# Patient Record
Sex: FEMALE | Race: WHITE | NOT HISPANIC OR LATINO | Employment: FULL TIME | URBAN - METROPOLITAN AREA
[De-identification: names, ages, dates, MRNs, and addresses within clinical notes are randomized per-mention and may not be internally consistent; named-entity substitution may affect disease eponyms.]

---

## 2017-02-03 DIAGNOSIS — Z12.31 OTHER SCREENING MAMMOGRAM: ICD-10-CM

## 2017-02-21 ENCOUNTER — PATIENT OUTREACH (OUTPATIENT)
Dept: ADMINISTRATIVE | Facility: HOSPITAL | Age: 42
End: 2017-02-21

## 2017-03-06 ENCOUNTER — OFFICE VISIT (OUTPATIENT)
Dept: FAMILY MEDICINE | Facility: CLINIC | Age: 42
End: 2017-03-06
Payer: COMMERCIAL

## 2017-03-06 VITALS
WEIGHT: 231.81 LBS | HEIGHT: 67 IN | DIASTOLIC BLOOD PRESSURE: 81 MMHG | BODY MASS INDEX: 36.38 KG/M2 | SYSTOLIC BLOOD PRESSURE: 138 MMHG | HEART RATE: 61 BPM | TEMPERATURE: 99 F

## 2017-03-06 DIAGNOSIS — Z87.01 HISTORY OF PNEUMONIA: ICD-10-CM

## 2017-03-06 DIAGNOSIS — K21.9 GASTROESOPHAGEAL REFLUX DISEASE, ESOPHAGITIS PRESENCE NOT SPECIFIED: ICD-10-CM

## 2017-03-06 DIAGNOSIS — I10 ESSENTIAL HYPERTENSION: ICD-10-CM

## 2017-03-06 DIAGNOSIS — Z83.3 FAMILY HISTORY OF DIABETES MELLITUS: ICD-10-CM

## 2017-03-06 DIAGNOSIS — Z00.00 ROUTINE HEALTH MAINTENANCE: Primary | ICD-10-CM

## 2017-03-06 PROCEDURE — 90670 PCV13 VACCINE IM: CPT | Mod: S$GLB,,, | Performed by: NURSE PRACTITIONER

## 2017-03-06 PROCEDURE — 3079F DIAST BP 80-89 MM HG: CPT | Mod: S$GLB,,, | Performed by: NURSE PRACTITIONER

## 2017-03-06 PROCEDURE — 99999 PR PBB SHADOW E&M-EST. PATIENT-LVL III: CPT | Mod: PBBFAC,,, | Performed by: NURSE PRACTITIONER

## 2017-03-06 PROCEDURE — 99396 PREV VISIT EST AGE 40-64: CPT | Mod: 25,S$GLB,, | Performed by: NURSE PRACTITIONER

## 2017-03-06 PROCEDURE — 90471 IMMUNIZATION ADMIN: CPT | Mod: S$GLB,,, | Performed by: NURSE PRACTITIONER

## 2017-03-06 PROCEDURE — 3075F SYST BP GE 130 - 139MM HG: CPT | Mod: S$GLB,,, | Performed by: NURSE PRACTITIONER

## 2017-03-06 RX ORDER — ENALAPRIL MALEATE 2.5 MG/1
2.5 TABLET ORAL DAILY
Qty: 90 TABLET | Refills: 3 | Status: SHIPPED | OUTPATIENT
Start: 2017-03-06 | End: 2017-07-12

## 2017-03-06 RX ORDER — PANTOPRAZOLE SODIUM 40 MG/1
40 TABLET, DELAYED RELEASE ORAL DAILY
Qty: 90 TABLET | Refills: 3 | Status: SHIPPED | OUTPATIENT
Start: 2017-03-06 | End: 2018-03-08 | Stop reason: SDUPTHER

## 2017-03-06 NOTE — MR AVS SNAPSHOT
[facility]  [address] [address] [address]  Phone: [phone]  Fax: [phone]                  Danielle Alarcon   3/6/2017 8:40 AM   Office Visit    Description:  Female : 1975   Provider:  Cecelia Silva NP   Department:  [facility]           Diagnoses this Visit        Comments    Routine health maintenance    -  Primary     Gastroesophageal reflux disease, esophagitis presence not specified         Essential hypertension         Family history of diabetes mellitus         History of pneumonia     Multiple           To Do List           Goals (5 Years of Data)     None       These Medications        Disp Refills Start End    pantoprazole (PROTONIX) 40 MG tablet 90 tablet 3 3/6/2017     Take 1 tablet (40 mg total) by mouth once daily. - Oral    Pharmacy: [facility] [address] Ph #: [phone]       enalapril (VASOTEC) 2.5 MG tablet 90 tablet 3 3/6/2017     Take 1 tablet (2.5 mg total) by mouth once daily. - Oral    Pharmacy: [address] Ph #: [phone]       Notes to Pharmacy: This prescription was filled today. Any refills authorized will be placed on file.      [facility] On Call     [facility] On Call Nurse Care Line -  Assistance  Registered nurses in the [facility] provide clinical advisement, health education, appointment booking, and other advisory services.  Call for this free service at [phone].             Medications           Message regarding Medications     Verify the changes and/or additions to your medication regime listed below are the same as discussed with your clinician today.  If any of these changes or additions are incorrect, please notify your healthcare provider.        CHANGE how you are taking these medications     Start Taking Instead of    pantoprazole (PROTONIX) 40 MG tablet pantoprazole (PROTONIX)  "40 MG tablet    Dosage:  Take 1 tablet (40 mg total) by mouth once daily. Dosage:  TAKE ONE TABLET BY MOUTH ONCE A DAY    Reason for Change:  Reorder            Verify that the below list of medications is an accurate representation of the medications you are currently taking.  If none reported, the list may be blank. If incorrect, please contact your healthcare provider. Carry this list with you in case of emergency.           Current Medications     enalapril (VASOTEC) 2.5 MG tablet Take 1 tablet (2.5 mg total) by mouth once daily.    FLAXSEED OIL MISC 2 tablets by Misc.(Non-Drug; Combo Route) route every morning. 2 TABS    ibuprofen (ADVIL,MOTRIN) 200 MG tablet Take 200 mg by mouth every 6 (six) hours as needed.     pantoprazole (PROTONIX) 40 MG tablet Take 1 tablet (40 mg total) by mouth once daily.           Clinical Reference Information           Your Vitals Were     BP Pulse Temp Height Weight BMI    138/81 61 98.8 °F (37.1 °C) (Oral) 5' 7" (1.702 m) 105.2 kg (231 lb 13 oz) 36.31 kg/m2      Blood Pressure          Most Recent Value    BP  138/81      Allergies as of 3/6/2017     No Known Allergies      Immunizations Administered on Date of Encounter - 3/6/2017     Name Date Dose VIS Date Route    Pneumococcal Conjugate - 13 Valent  Incomplete 0.5 mL 11/5/2015 Intramuscular      Orders Placed During Today's Visit      Normal Orders This Visit    Pneumococcal Conjugate Vaccine (13 Valent) (IM)     Future Labs/Procedures Expected by Expires    ALT (SGPT)  3/6/2017 5/5/2018    Basic metabolic panel  3/6/2017 3/6/2018    CBC auto differential  3/6/2017 5/5/2018    Hemoglobin A1c  3/6/2017 5/5/2018    Lipid panel  3/6/2017 3/6/2018    TSH  3/6/2017 5/5/2018      Language Assistance Services     ATTENTION: Language assistance services are available, free of charge. Please call [phone].      ATENCIÓN: Si habla español, tiene a caba disposición servicios gratuitos de asistencia lingüística. Llame al " [phone].     OLGA Ý: N?u b?n nói Ti?ng Vi?t, có các d?ch v? h? tr? ngôn ng? mi?n phí dành cho b?n. G?i s? [phone].         [facility] complies with applicable Federal civil rights laws and does not discriminate on the basis of race, color, national origin, age, disability, or sex.

## 2017-03-06 NOTE — PROGRESS NOTES
Subjective:       Patient ID: Danielle Alarcon is a 41 y.o. female.    Chief Complaint: No chief complaint on file.  Pt in today for annual exam. GERD, HTN well controlled with current medications. Mammogram UTD. Pt states had pap smear in November 2016. Labs due. Pt states has had multiple pneumonia infections, invasive pneumonia; PCV 23 UTD, however UTD states both PCV 23 and 13 recommended. Pt also requests hgb a1c due to family history of diabetes, personal history of gestational diabetes; denies any diabetes symptoms. Pt has no other complaints today.  Past Medical History:   Diagnosis Date    Bronchial pneumonia     2008    Depression     Gestational diabetes      Social History     Social History    Marital status:      Spouse name: N/A    Number of children: N/A    Years of education: N/A     Occupational History         Social History Main Topics    Smoking status: Former Smoker    Smokeless tobacco: Not on file    Alcohol use No    Drug use: No    Sexual activity: Not on file     Past Surgical History:   Procedure Laterality Date    CHOLECYSTECTOMY      TONSILLECTOMY         HPI  Review of Systems   Constitutional: Negative.    HENT: Negative.    Eyes: Negative.    Respiratory: Negative.    Cardiovascular: Negative.    Gastrointestinal: Negative.    Endocrine: Negative.    Genitourinary: Negative.    Musculoskeletal: Negative.    Skin: Negative.    Allergic/Immunologic: Negative.    Neurological: Negative.    Psychiatric/Behavioral: Negative.        Objective:      Physical Exam   Constitutional: She is oriented to person, place, and time. She appears well-developed and well-nourished.   HENT:   Head: Normocephalic.   Right Ear: External ear normal.   Left Ear: External ear normal.   Nose: Nose normal.   Mouth/Throat: Oropharynx is clear and moist.   Eyes: Conjunctivae are normal. Pupils are equal, round, and reactive to light.   Neck: Normal range of motion. Neck supple.    Cardiovascular: Normal rate, regular rhythm and normal heart sounds.    Pulmonary/Chest: Effort normal and breath sounds normal.   Abdominal: Soft. Bowel sounds are normal.   Musculoskeletal: Normal range of motion.   Neurological: She is alert and oriented to person, place, and time.   Skin: Skin is warm and dry.   Psychiatric: She has a normal mood and affect. Her behavior is normal. Judgment and thought content normal.   Nursing note and vitals reviewed.      Assessment:       1. Routine health maintenance    2. Gastroesophageal reflux disease, esophagitis presence not specified    3. Essential hypertension    4. Family history of diabetes mellitus    5. History of pneumonia        Plan:           Diagnoses and all orders for this visit:    Routine health maintenance  Gastroesophageal reflux disease, esophagitis presence not specified  Essential hypertension  Family history of diabetes mellitus  History of pneumonia  -     Basic metabolic panel; Future  -     ALT (SGPT); Future  -     TSH; Future  -     CBC auto differential; Future  -     Lipid panel; Future  -     Hemoglobin A1c; Future  Comments:  Multiple  -     pantoprazole (PROTONIX) 40 MG tablet; Take 1 tablet (40 mg total) by mouth once daily.  -     enalapril (VASOTEC) 2.5 MG tablet; Take 1 tablet (2.5 mg total) by mouth once daily.  -     Pneumococcal Conjugate Vaccine (13 Valent) (IM)

## 2017-03-08 ENCOUNTER — TELEPHONE (OUTPATIENT)
Dept: FAMILY MEDICINE | Facility: CLINIC | Age: 42
End: 2017-03-08

## 2017-03-08 DIAGNOSIS — K80.50 BILIARY COLIC: Primary | ICD-10-CM

## 2017-03-08 LAB
ALT SERPL-CCNC: 12 U/L (ref 6–29)
BASOPHILS # BLD AUTO: 36 CELLS/UL (ref 0–200)
BASOPHILS NFR BLD AUTO: 0.5 %
BUN SERPL-MCNC: 14 MG/DL (ref 7–25)
BUN/CREAT SERPL: ABNORMAL (CALC) (ref 6–22)
CALCIUM SERPL-MCNC: 8.7 MG/DL (ref 8.6–10.2)
CHLORIDE SERPL-SCNC: 107 MMOL/L (ref 98–110)
CHOLEST SERPL-MCNC: 167 MG/DL (ref 125–200)
CHOLEST/HDLC SERPL: 2.7 (CALC)
CO2 SERPL-SCNC: 27 MMOL/L (ref 20–31)
CREAT SERPL-MCNC: 0.92 MG/DL (ref 0.5–1.1)
EOSINOPHIL # BLD AUTO: 0 CELLS/UL (ref 15–500)
EOSINOPHIL NFR BLD AUTO: 0 %
ERYTHROCYTE [DISTWIDTH] IN BLOOD BY AUTOMATED COUNT: 17.8 % (ref 11–15)
GFR SERPL CREATININE-BSD FRML MDRD: 76 ML/MIN/1.73M2
GLUCOSE SERPL-MCNC: 101 MG/DL (ref 65–99)
HBA1C MFR BLD: 5.5 % OF TOTAL HGB
HCT VFR BLD AUTO: 39.1 % (ref 35–45)
HDLC SERPL-MCNC: 63 MG/DL
HGB BLD-MCNC: 12.9 G/DL (ref 11.7–15.5)
LDLC SERPL CALC-MCNC: 97 MG/DL (CALC)
LYMPHOCYTES # BLD AUTO: 1843 CELLS/UL (ref 850–3900)
LYMPHOCYTES NFR BLD AUTO: 25.6 %
MCH RBC QN AUTO: 27.7 PG (ref 27–33)
MCHC RBC AUTO-ENTMCNC: 33 G/DL (ref 32–36)
MCV RBC AUTO: 83.9 FL (ref 80–100)
MONOCYTES # BLD AUTO: 353 CELLS/UL (ref 200–950)
MONOCYTES NFR BLD AUTO: 4.9 %
NEUTROPHILS # BLD AUTO: 4968 CELLS/UL (ref 1500–7800)
NEUTROPHILS NFR BLD AUTO: 69 %
NONHDLC SERPL-MCNC: 104 MG/DL (CALC)
PLATELET # BLD AUTO: 148 THOUSAND/UL (ref 140–400)
PMV BLD REES-ECKER: 10.7 FL (ref 7.5–12.5)
POTASSIUM SERPL-SCNC: 4.6 MMOL/L (ref 3.5–5.3)
RBC # BLD AUTO: 4.66 MILLION/UL (ref 3.8–5.1)
SODIUM SERPL-SCNC: 141 MMOL/L (ref 135–146)
TRIGL SERPL-MCNC: 35 MG/DL
TSH SERPL-ACNC: 0.57 MIU/L
WBC # BLD AUTO: 7.2 THOUSAND/UL (ref 3.8–10.8)

## 2017-05-23 ENCOUNTER — PATIENT MESSAGE (OUTPATIENT)
Dept: FAMILY MEDICINE | Facility: CLINIC | Age: 42
End: 2017-05-23

## 2017-05-23 RX ORDER — LISINOPRIL 2.5 MG/1
2.5 TABLET ORAL DAILY
Qty: 90 TABLET | Refills: 3 | Status: SHIPPED | OUTPATIENT
Start: 2017-05-23 | End: 2018-03-08 | Stop reason: SDUPTHER

## 2017-07-12 ENCOUNTER — HOSPITAL ENCOUNTER (OUTPATIENT)
Dept: RADIOLOGY | Facility: HOSPITAL | Age: 42
Discharge: HOME | End: 2017-07-12
Attending: NURSE PRACTITIONER
Payer: COMMERCIAL

## 2017-07-12 ENCOUNTER — OFFICE VISIT (OUTPATIENT)
Dept: FAMILY MEDICINE | Facility: CLINIC | Age: 42
End: 2017-07-12
Payer: COMMERCIAL

## 2017-07-12 ENCOUNTER — TELEPHONE (OUTPATIENT)
Dept: FAMILY MEDICINE | Facility: CLINIC | Age: 42
End: 2017-07-12

## 2017-07-12 VITALS
HEIGHT: 67 IN | DIASTOLIC BLOOD PRESSURE: 98 MMHG | WEIGHT: 224.31 LBS | RESPIRATION RATE: 18 BRPM | SYSTOLIC BLOOD PRESSURE: 134 MMHG | BODY MASS INDEX: 35.21 KG/M2 | HEART RATE: 82 BPM | TEMPERATURE: 98 F

## 2017-07-12 DIAGNOSIS — J40 SINOBRONCHITIS: Primary | ICD-10-CM

## 2017-07-12 DIAGNOSIS — J32.9 SINOBRONCHITIS: Primary | ICD-10-CM

## 2017-07-12 DIAGNOSIS — J32.9 SINOBRONCHITIS: ICD-10-CM

## 2017-07-12 DIAGNOSIS — I10 ESSENTIAL HYPERTENSION: ICD-10-CM

## 2017-07-12 DIAGNOSIS — J40 SINOBRONCHITIS: ICD-10-CM

## 2017-07-12 PROCEDURE — 99214 OFFICE O/P EST MOD 30 MIN: CPT | Mod: S$GLB,,, | Performed by: NURSE PRACTITIONER

## 2017-07-12 PROCEDURE — 99999 PR PBB SHADOW E&M-EST. PATIENT-LVL III: CPT | Mod: PBBFAC,,, | Performed by: NURSE PRACTITIONER

## 2017-07-12 PROCEDURE — 71020 XR CHEST PA AND LATERAL: CPT | Mod: TC,PO

## 2017-07-12 PROCEDURE — 71020 XR CHEST PA AND LATERAL: CPT | Mod: 26,,, | Performed by: RADIOLOGY

## 2017-07-12 RX ORDER — CODEINE PHOSPHATE AND GUAIFENESIN 10; 100 MG/5ML; MG/5ML
5 SOLUTION ORAL EVERY 8 HOURS PRN
Qty: 118 ML | Refills: 0 | Status: SHIPPED | OUTPATIENT
Start: 2017-07-12 | End: 2017-07-22

## 2017-07-12 RX ORDER — FLUTICASONE PROPIONATE 50 MCG
1 SPRAY, SUSPENSION (ML) NASAL DAILY
Qty: 16 G | Refills: 0 | Status: SHIPPED | OUTPATIENT
Start: 2017-07-12 | End: 2018-03-08

## 2017-07-12 RX ORDER — CODEINE PHOSPHATE AND GUAIFENESIN 10; 100 MG/5ML; MG/5ML
5 SOLUTION ORAL EVERY 8 HOURS PRN
Qty: 118 ML | Refills: 0 | Status: SHIPPED | OUTPATIENT
Start: 2017-07-12 | End: 2017-07-12 | Stop reason: SDUPTHER

## 2017-07-12 RX ORDER — ALBUTEROL SULFATE 90 UG/1
2 AEROSOL, METERED RESPIRATORY (INHALATION) EVERY 6 HOURS PRN
Qty: 18 G | Refills: 0 | Status: SHIPPED | OUTPATIENT
Start: 2017-07-12 | End: 2018-03-08

## 2017-07-12 RX ORDER — DOXYCYCLINE HYCLATE 100 MG
100 TABLET ORAL 2 TIMES DAILY
Qty: 14 TABLET | Refills: 0 | Status: SHIPPED | OUTPATIENT
Start: 2017-07-12 | End: 2018-03-08

## 2017-07-12 RX ORDER — METHYLPREDNISOLONE 4 MG/1
TABLET ORAL
Qty: 1 PACKAGE | Refills: 0 | Status: SHIPPED | OUTPATIENT
Start: 2017-07-12 | End: 2018-03-08

## 2017-07-12 NOTE — TELEPHONE ENCOUNTER
----- Message from Mi Weiss sent at 7/12/2017  9:45 AM CDT -----  Contact: Pt  - Max   States he's calling rg pt's insurance not being able to verify. Pt has bronchitis and is wanting to discuss / pt can be reached at [phone]//thanks/dbw

## 2017-07-12 NOTE — PROGRESS NOTES
"CC:   Chief Complaint   Patient presents with    Cough     HPI: This is a new problem.   Danielle Alarcon is a 41 y.o. female with a complaint of URI.  The current episode started in the past 4 days.   The problem has been gradually worsening.   Associated symptoms included fever, chills, cough, wheezing, yellow and brown sputum.    Pertinent negatives include dyspnea   Treatments tried: ibuprofen and tessalon perles has been used and this has provided no relief.   Non smoker, history of pneumonia      [unfilled]  Outpatient Medications Prior to Visit   Medication Sig Dispense Refill    FLAXSEED OIL MISC 2 tablets by Misc.(Non-Drug; Combo Route) route every morning. 2 TABS      ibuprofen (ADVIL,MOTRIN) 200 MG tablet Take 200 mg by mouth every 6 (six) hours as needed.       lisinopril (PRINIVIL,ZESTRIL) 2.5 MG tablet Take 1 tablet (2.5 mg total) by mouth once daily. 90 tablet 3    pantoprazole (PROTONIX) 40 MG tablet Take 1 tablet (40 mg total) by mouth once daily. 90 tablet 3    enalapril (VASOTEC) 2.5 MG tablet Take 1 tablet (2.5 mg total) by mouth once daily. 90 tablet 3     No facility-administered medications prior to visit.         Physical Exam   BP (!) 134/98   Pulse 82   Temp 98.4 °F (36.9 °C) (Oral)   Resp 18   Ht 5' 7" (1.702 m)   Wt 101.8 kg (224 lb 5.1 oz)   BMI 35.13 kg/m²   Constitutional: The patient appears well-developed and well-nourished.   Head: Normocephalic and atraumatic.   Right Ear: Tympanic membrane and ear canal normal. No drainage, swelling or tenderness. Tympanic membrane is not injected, not erythematous and not bulging.   Left Ear: Ear canal normal. No drainage, swelling or tenderness. Tympanic membrane is not injected, not erythematous and not bulging.   Nose: Mucosal edema and rhinorrhea present. Sinus tenderness on palpation  Mouth/Throat: Uvula is midline. Posterior oropharyngeal erythema present. No oropharyngeal exudate.        THE MUCOSA IS BOGGY AND " ERYTHEMATOUS.     Eyes: Conjunctivae normal and lids are normal. Pupils are equal, round, and reactive to light. Right eye exhibits no discharge. Left eye exhibits no discharge. Right eye exhibits normal extraocular motion. Left eye exhibits normal extraocular motion.   Neck: Trachea normal and normal range of motion. Neck supple. No tracheal tenderness present. No mass and no thyromegaly present.   Cardiovascular: Normal rate, regular rhythm, S1 normal, S2 normal and normal heart sounds.  Exam reveals no gallop, no S3, no S4 and no friction rub.    No murmur heard.  Pulmonary/Chest: Effort normal and breath sounds normal. No stridor. Not tachypneic. No respiratory distress. The patient has no wheezes. The patient has no rhonchi. The patient has no rales.   Skin: The patient is not diaphoretic.     Encounter Diagnoses   Name Primary?    Sinobronchitis Yes    Essential hypertension        PLAN:    Danielle was seen today for cough.    Diagnoses and all orders for this visit:    Sinobronchitis  -     X-Ray Chest PA And Lateral; Future  -     fluticasone (FLONASE) 50 mcg/actuation nasal spray; 1 spray by Each Nare route once daily.  -     doxycycline (VIBRA-TABS) 100 MG tablet; Take 1 tablet (100 mg total) by mouth 2 (two) times daily.  -     albuterol 90 mcg/actuation inhaler; Inhale 2 puffs into the lungs every 6 (six) hours as needed for Wheezing. Rescue  -     methylPREDNISolone (MEDROL DOSEPACK) 4 mg tablet; use as directed  -     Discontinue: guaifenesin-codeine 100-10 mg/5 ml (TUSSI-ORGANIDIN NR)  mg/5 mL syrup; Take 5 mLs by mouth every 8 (eight) hours as needed for Cough.  -     guaifenesin-codeine 100-10 mg/5 ml (TUSSI-ORGANIDIN NR)  mg/5 mL syrup; Take 5 mLs by mouth every 8 (eight) hours as needed for Cough.    CXR: Heart size and mediastinal contour are normal.  No confluent infiltrates or effusions noted.  Vague developing opacity noted over the medial right lung apex, indeterminate.  CT  recommended for further evaluation.  -orders for CT placed. Will have patient follow up  Essential hypertension  -bp slightly elevated. Pt experienced moderate amount of coughing throughout appt.  Will re evaluate bp in 1-2 weeks upon resolution of sinobronchitis      Medications Ordered This Encounter      albuterol 90 mcg/actuation inhaler          Sig: Inhale 2 puffs into the lungs every 6 (six) hours as needed for Wheezing. Rescue          Dispense:  18 g          Refill:  0      doxycycline (VIBRA-TABS) 100 MG tablet          Sig: Take 1 tablet (100 mg total) by mouth 2 (two) times daily.          Dispense:  14 tablet          Refill:  0      fluticasone (FLONASE) 50 mcg/actuation nasal spray          Si spray by Each Nare route once daily.          Dispense:  16 g          Refill:  0      guaifenesin-codeine 100-10 mg/5 ml (TUSSI-ORGANIDIN NR)  mg/5 mL syrup          Sig: Take 5 mLs by mouth every 8 (eight) hours as needed for Cough.          Dispense:  118 mL          Refill:  0      methylPREDNISolone (MEDROL DOSEPACK) 4 mg tablet          Sig: use as directed          Dispense:  1 Package          Refill:  0  Orders Placed This Encounter   Procedures    X-Ray Chest PA And Lateral     Standing Status:   Future     Number of Occurrences:   1     Standing Expiration Date:   2018     Order Specific Question:   May the Radiologist modify the order per protocol to meet the clinical needs of the patient?     Answer:   Yes     RTC if symptoms are worsening or changing significantly or if not improved by the end of therapy.

## 2017-07-13 ENCOUNTER — HOSPITAL ENCOUNTER (OUTPATIENT)
Dept: RADIOLOGY | Facility: HOSPITAL | Age: 42
Discharge: HOME | End: 2017-07-13
Attending: NURSE PRACTITIONER
Payer: COMMERCIAL

## 2017-07-13 DIAGNOSIS — J40 SINOBRONCHITIS: ICD-10-CM

## 2017-07-13 DIAGNOSIS — J32.9 SINOBRONCHITIS: ICD-10-CM

## 2017-07-13 PROCEDURE — 71250 CT THORAX DX C-: CPT | Mod: 26,,, | Performed by: RADIOLOGY

## 2017-07-13 PROCEDURE — 71250 CT THORAX DX C-: CPT | Mod: TC,PO

## 2017-07-17 DIAGNOSIS — M25.511 ACUTE PAIN OF RIGHT SHOULDER: Primary | ICD-10-CM

## 2017-08-04 DIAGNOSIS — M25.511 ACUTE PAIN OF RIGHT SHOULDER: Primary | ICD-10-CM

## 2017-08-07 ENCOUNTER — OFFICE VISIT (OUTPATIENT)
Dept: ORTHOPEDICS | Facility: CLINIC | Age: 42
End: 2017-08-07
Payer: COMMERCIAL

## 2017-08-07 ENCOUNTER — HOSPITAL ENCOUNTER (OUTPATIENT)
Dept: RADIOLOGY | Facility: HOSPITAL | Age: 42
Discharge: HOME | End: 2017-08-07
Attending: ORTHOPAEDIC SURGERY
Payer: COMMERCIAL

## 2017-08-07 VITALS
HEART RATE: 72 BPM | SYSTOLIC BLOOD PRESSURE: 138 MMHG | WEIGHT: 222.81 LBS | HEIGHT: 67 IN | BODY MASS INDEX: 34.97 KG/M2 | DIASTOLIC BLOOD PRESSURE: 86 MMHG

## 2017-08-07 DIAGNOSIS — M25.511 ACUTE PAIN OF RIGHT SHOULDER: ICD-10-CM

## 2017-08-07 DIAGNOSIS — M75.31 CALCIFIC TENDONITIS OF RIGHT SHOULDER: Primary | ICD-10-CM

## 2017-08-07 DIAGNOSIS — S46.811A TRAPEZIUS STRAIN, RIGHT, INITIAL ENCOUNTER: ICD-10-CM

## 2017-08-07 DIAGNOSIS — M19.011 ARTHRITIS OF RIGHT ACROMIOCLAVICULAR JOINT: ICD-10-CM

## 2017-08-07 PROCEDURE — 73030 X-RAY EXAM OF SHOULDER: CPT | Mod: TC,PO,RT

## 2017-08-07 PROCEDURE — 73030 X-RAY EXAM OF SHOULDER: CPT | Mod: 26,RT,, | Performed by: RADIOLOGY

## 2017-08-07 PROCEDURE — 99214 OFFICE O/P EST MOD 30 MIN: CPT | Mod: S$GLB,,, | Performed by: ORTHOPAEDIC SURGERY

## 2017-08-07 PROCEDURE — 99999 PR PBB SHADOW E&M-EST. PATIENT-LVL III: CPT | Mod: PBBFAC,,, | Performed by: ORTHOPAEDIC SURGERY

## 2017-08-07 PROCEDURE — 3075F SYST BP GE 130 - 139MM HG: CPT | Mod: S$GLB,,, | Performed by: ORTHOPAEDIC SURGERY

## 2017-08-07 PROCEDURE — 3008F BODY MASS INDEX DOCD: CPT | Mod: S$GLB,,, | Performed by: ORTHOPAEDIC SURGERY

## 2017-08-07 PROCEDURE — 3079F DIAST BP 80-89 MM HG: CPT | Mod: S$GLB,,, | Performed by: ORTHOPAEDIC SURGERY

## 2017-08-07 RX ORDER — MELOXICAM 15 MG/1
15 TABLET ORAL DAILY
Qty: 60 TABLET | Refills: 0 | Status: SHIPPED | OUTPATIENT
Start: 2017-08-07 | End: 2018-03-08

## 2017-08-07 NOTE — LETTER
August 9, 2017      Queenie Herrera NP  [address]  1st [address]  [facility] [address]           [facility]  [address] [facility] [address]  Phone: [phone]          Patient: Danielle Alarcon   MR Number: [id number]   YOB: 1975   Date of Visit: 8/7/2017       Dear Queenie Herrera:    Thank you for referring Danielle Alarcon to me for evaluation. Attached you will find relevant portions of my assessment and plan of care.    If you have questions, please do not hesitate to call me. I look forward to following Danielle Alarcon along with you.    Sincerely,    Flex Gonzales MD    Enclosure  CC:  No Recipients    If you would like to receive this communication electronically, please contact [email or web address] or [phone] to request more information on [facility] Link access.    For providers and/or their staff who would like to refer a patient to [facility], please contact us through our one-stop-shop provider referral line, Conch[facility] Root, at [phone].    If you feel you have received this communication in error or would no longer like to receive these types of communications, please e-mail [email or web address]

## 2017-08-09 NOTE — PROGRESS NOTES
Subjective:          Chief Complaint: Danielle Alarcon is a 42 y.o. female who had concerns including Pain of the Right Shoulder.    Ms. Santos is here today for right shoulder pain. She has had this pain in the past and is primarily over her right trapezius. Her pain had gotten better after her last recurrence but returned after starting to work again. She is a .    Pain: 1/10      Pain   Associated symptoms include myalgias.          Past Medical History:   Diagnosis Date    Bronchial pneumonia     2008    Depression     Gestational diabetes        Past Surgical History:   Procedure Laterality Date    CHOLECYSTECTOMY      TONSILLECTOMY         Family History   Problem Relation Age of Onset    Diabetes Mother     Hypertension Mother     Hyperlipidemia Mother     Hyperlipidemia Father     Hypertension Father     Diabetes Brother          Current Outpatient Prescriptions:     albuterol 90 mcg/actuation inhaler, Inhale 2 puffs into the lungs every 6 (six) hours as needed for Wheezing. Rescue, Disp: 18 g, Rfl: 0    doxycycline (VIBRA-TABS) 100 MG tablet, Take 1 tablet (100 mg total) by mouth 2 (two) times daily., Disp: 14 tablet, Rfl: 0    FLAXSEED OIL MISC, 2 tablets by Misc.(Non-Drug; Combo Route) route every morning. 2 TABS, Disp: , Rfl:     fluticasone (FLONASE) 50 mcg/actuation nasal spray, 1 spray by Each Nare route once daily., Disp: 16 g, Rfl: 0    ibuprofen (ADVIL,MOTRIN) 200 MG tablet, Take 200 mg by mouth every 6 (six) hours as needed. , Disp: , Rfl:     lisinopril (PRINIVIL,ZESTRIL) 2.5 MG tablet, Take 1 tablet (2.5 mg total) by mouth once daily., Disp: 90 tablet, Rfl: 3    methylPREDNISolone (MEDROL DOSEPACK) 4 mg tablet, use as directed, Disp: 1 Package, Rfl: 0    pantoprazole (PROTONIX) 40 MG tablet, Take 1 tablet (40 mg total) by mouth once daily., Disp: 90 tablet, Rfl: 3    meloxicam (MOBIC) 15 MG tablet, Take 1 tablet (15 mg total) by mouth once daily., Disp: 60 tablet,  Rfl: 0    Review of patient's allergies indicates:  No Known Allergies    Vitals:    08/07/17 1310   BP: 138/86   Pulse: 72       Review of Systems   Musculoskeletal: Positive for joint pain and myalgias.   All other systems reviewed and are negative.                  Objective:        General: Danielle is well-developed, well-nourished, appears stated age, in no acute distress, alert and oriented to time, place and person.     General    Vitals reviewed.  Constitutional: She is oriented to person, place, and time. She appears well-developed and well-nourished. No distress.   HENT:   Head: Normocephalic and atraumatic.   Nose: Nose normal.   Eyes: Pupils are equal, round, and reactive to light.   Cardiovascular: Normal rate.    Pulmonary/Chest: Effort normal.   Neurological: She is alert and oriented to person, place, and time.   Psychiatric: She has a normal mood and affect. Her behavior is normal. Judgment and thought content normal.         Back (L-Spine & T-Spine) / Neck (C-Spine) Exam     Tenderness   The patient is tender to palpation of the right trapezial.   Right Shoulder Exam     Inspection/Observation   Bruising: absent  Scars: absent  Deformity: present  Scapular Winging: absent  Scapular Dyskinesia: positive  Atrophy: absent    Tenderness   The patient is tender to palpation of the acromioclavicular joint.    Range of Motion   Active Abduction:  170 normal   Passive Abduction:  170 normal   Extension: normal   Forward Flexion:  180 normal   Forward Elevation: 180 normal  Adduction: normal  External Rotation 0 degrees:  50 normal   External Rotation 90 degrees: 80 normal  Internal Rotation 0 degrees:  Mid thoracic normal   Internal Rotation 90 degrees:  50 normal     Tests & Signs   Rotator Cuff Painful Arc/Range: mild    Other   Sensation: normal    Left Shoulder Exam   Left shoulder exam is normal.      Muscle Strength   Right Upper Extremity   Shoulder Abduction: 5/5   Shoulder Internal Rotation: 5/5    Shoulder External Rotation: 5/5   Supraspinatus: 5/5/5   Subscapularis: 5/5/5   Biceps: 5/5/5     Vascular Exam       Capillary Refill  Right Hand: normal capillary refill        Current and previous radiographic studies and results were reviewed with the patient:   Findings: There is degenerative hypertrophy and subarticular spurring of the acromioclavicular joint.  Glenohumeral joint is intact.  Mild spurring of the greater tuberosity of the humerus.  Soft tissue calcification in the acromiohumeral space suggesting calcific tendinitis.  No acute fracture or dislocation.        Assessment:       Encounter Diagnoses   Name Primary?    Acute pain of right shoulder     Arthritis of right acromioclavicular joint     Calcific tendonitis of right shoulder Yes    Trapezius strain, right, initial encounter           Plan:         Physical Therapy  Medrol Dosepack x 1  Meloxicam 15mg PO QD  F/U in 6 weeks

## 2018-02-22 ENCOUNTER — PATIENT MESSAGE (OUTPATIENT)
Dept: ADMINISTRATIVE | Facility: HOSPITAL | Age: 43
End: 2018-02-22

## 2018-02-22 ENCOUNTER — PATIENT OUTREACH (OUTPATIENT)
Dept: ADMINISTRATIVE | Facility: HOSPITAL | Age: 43
End: 2018-02-22

## 2018-02-22 DIAGNOSIS — Z00.00 ROUTINE ADULT HEALTH MAINTENANCE: Primary | ICD-10-CM

## 2018-02-22 NOTE — TELEPHONE ENCOUNTER
Dr Adame, this patient has an appointment for an annual on 3/8/18, she wants lab work done before that visit, and she wants to go to [facility], is this something you want to do? Please advise, thanks

## 2018-03-03 LAB
ALBUMIN SERPL-MCNC: 3.9 G/DL (ref 3.6–5.1)
ALBUMIN/GLOB SERPL: 1.7 (CALC) (ref 1–2.5)
ALP SERPL-CCNC: 74 U/L (ref 33–115)
ALT SERPL-CCNC: 10 U/L (ref 6–29)
AST SERPL-CCNC: 12 U/L (ref 10–30)
BASOPHILS # BLD AUTO: 48 CELLS/UL (ref 0–200)
BASOPHILS NFR BLD AUTO: 0.8 %
BILIRUB SERPL-MCNC: 0.5 MG/DL (ref 0.2–1.2)
BUN SERPL-MCNC: 18 MG/DL (ref 7–25)
BUN/CREAT SERPL: NORMAL (CALC) (ref 6–22)
CALCIUM SERPL-MCNC: 8.7 MG/DL (ref 8.6–10.2)
CHLORIDE SERPL-SCNC: 108 MMOL/L (ref 98–110)
CO2 SERPL-SCNC: 29 MMOL/L (ref 20–31)
CREAT SERPL-MCNC: 0.88 MG/DL (ref 0.5–1.1)
EOSINOPHIL # BLD AUTO: 204 CELLS/UL (ref 15–500)
EOSINOPHIL NFR BLD AUTO: 3.4 %
ERYTHROCYTE [DISTWIDTH] IN BLOOD BY AUTOMATED COUNT: 15 % (ref 11–15)
GFR SERPL CREATININE-BSD FRML MDRD: 80 ML/MIN/1.73M2
GLOBULIN SER CALC-MCNC: 2.3 G/DL (CALC) (ref 1.9–3.7)
GLUCOSE SERPL-MCNC: 89 MG/DL (ref 65–99)
HCT VFR BLD AUTO: 35.7 % (ref 35–45)
HGB BLD-MCNC: 11.4 G/DL (ref 11.7–15.5)
LYMPHOCYTES # BLD AUTO: 1752 CELLS/UL (ref 850–3900)
LYMPHOCYTES NFR BLD AUTO: 29.2 %
MCH RBC QN AUTO: 25.9 PG (ref 27–33)
MCHC RBC AUTO-ENTMCNC: 31.9 G/DL (ref 32–36)
MCV RBC AUTO: 81 FL (ref 80–100)
MONOCYTES # BLD AUTO: 264 CELLS/UL (ref 200–950)
MONOCYTES NFR BLD AUTO: 4.4 %
NEUTROPHILS # BLD AUTO: 3732 CELLS/UL (ref 1500–7800)
NEUTROPHILS NFR BLD AUTO: 62.2 %
PLATELET # BLD AUTO: 149 THOUSAND/UL (ref 140–400)
PMV BLD REES-ECKER: 12.7 FL (ref 7.5–12.5)
POTASSIUM SERPL-SCNC: 4.4 MMOL/L (ref 3.5–5.3)
PROT SERPL-MCNC: 6.2 G/DL (ref 6.1–8.1)
RBC # BLD AUTO: 4.41 MILLION/UL (ref 3.8–5.1)
SODIUM SERPL-SCNC: 141 MMOL/L (ref 135–146)
TSH SERPL-ACNC: 0.48 MIU/L
WBC # BLD AUTO: 6 THOUSAND/UL (ref 3.8–10.8)

## 2018-03-08 ENCOUNTER — OFFICE VISIT (OUTPATIENT)
Dept: FAMILY MEDICINE | Facility: CLINIC | Age: 43
End: 2018-03-08
Payer: COMMERCIAL

## 2018-03-08 VITALS
TEMPERATURE: 99 F | HEIGHT: 67 IN | WEIGHT: 217.13 LBS | BODY MASS INDEX: 34.08 KG/M2 | DIASTOLIC BLOOD PRESSURE: 74 MMHG | HEART RATE: 64 BPM | SYSTOLIC BLOOD PRESSURE: 120 MMHG

## 2018-03-08 DIAGNOSIS — Z23 NEED FOR VACCINATION: Primary | ICD-10-CM

## 2018-03-08 PROCEDURE — 90715 TDAP VACCINE 7 YRS/> IM: CPT | Mod: S$GLB,,, | Performed by: FAMILY MEDICINE

## 2018-03-08 PROCEDURE — 99999 PR PBB SHADOW E&M-EST. PATIENT-LVL III: CPT | Mod: PBBFAC,,, | Performed by: FAMILY MEDICINE

## 2018-03-08 PROCEDURE — 90471 IMMUNIZATION ADMIN: CPT | Mod: S$GLB,,, | Performed by: FAMILY MEDICINE

## 2018-03-08 PROCEDURE — 99396 PREV VISIT EST AGE 40-64: CPT | Mod: 25,S$GLB,, | Performed by: FAMILY MEDICINE

## 2018-03-08 RX ORDER — LISINOPRIL 2.5 MG/1
2.5 TABLET ORAL DAILY
Qty: 90 TABLET | Refills: 3 | Status: SHIPPED | OUTPATIENT
Start: 2018-03-08 | End: 2018-06-04 | Stop reason: SDUPTHER

## 2018-03-08 RX ORDER — PANTOPRAZOLE SODIUM 40 MG/1
40 TABLET, DELAYED RELEASE ORAL DAILY
Qty: 90 TABLET | Refills: 3 | Status: SHIPPED | OUTPATIENT
Start: 2018-03-08 | End: 2018-12-07 | Stop reason: SDUPTHER

## 2018-03-08 NOTE — PROGRESS NOTES
The patient presents today for general health evaluation and counseling      Past Medical History:  Past Medical History:   Diagnosis Date    Bronchial pneumonia     2008    Depression     Gestational diabetes      Past Surgical History:   Procedure Laterality Date    CHOLECYSTECTOMY      TONSILLECTOMY       Review of patient's allergies indicates:  No Known Allergies  Current Outpatient Prescriptions on File Prior to Visit   Medication Sig Dispense Refill    FLAXSEED OIL MISC 2 tablets by Misc.(Non-Drug; Combo Route) route every morning. 2 TABS      ibuprofen (ADVIL,MOTRIN) 200 MG tablet Take 200 mg by mouth every 6 (six) hours as needed.       lisinopril (PRINIVIL,ZESTRIL) 2.5 MG tablet Take 1 tablet (2.5 mg total) by mouth once daily. 90 tablet 3    pantoprazole (PROTONIX) 40 MG tablet Take 1 tablet (40 mg total) by mouth once daily. 90 tablet 3    albuterol 90 mcg/actuation inhaler Inhale 2 puffs into the lungs every 6 (six) hours as needed for Wheezing. Rescue 18 g 0    fluticasone (FLONASE) 50 mcg/actuation nasal spray 1 spray by Each Nare route once daily. 16 g 0    meloxicam (MOBIC) 15 MG tablet Take 1 tablet (15 mg total) by mouth once daily. 60 tablet 0    [DISCONTINUED] doxycycline (VIBRA-TABS) 100 MG tablet Take 1 tablet (100 mg total) by mouth 2 (two) times daily. 14 tablet 0    [DISCONTINUED] methylPREDNISolone (MEDROL DOSEPACK) 4 mg tablet use as directed 1 Package 0     No current facility-administered medications on file prior to visit.      Social History     Social History    Marital status:      Spouse name: N/A    Number of children: N/A    Years of education: N/A     Occupational History    Not on file.     Social History Main Topics    Smoking status: Former Smoker    Smokeless tobacco: Not on file    Alcohol use No    Drug use: No    Sexual activity: Not on file     Other Topics Concern    Not on file     Social History Narrative    No narrative on file      Family History   Problem Relation Age of Onset    Diabetes Mother     Hypertension Mother     Hyperlipidemia Mother     Hyperlipidemia Father     Hypertension Father     Diabetes Brother          ROS:GENERAL: No fever, chills, fatigability or weight loss.  SKIN: No rashes, itching or changes in color or texture of skin.  HEAD: No headaches or recent head trauma.EYES: Visual acuity fine. No photophobia, ocular pain or diplopia.EARS: Denies ear pain, discharge or vertigo.NOSE: No loss of smell, no epistaxis or postnasal drip.MOUTH & THROAT: No hoarseness or change in voice. No excessive gum bleeding.NODES: Denies swollen glands.  CHEST: Denies WILLIS, cyanosis, wheezing, cough and sputum production.  CARDIOVASCULAR: Denies chest pain, PND, orthopnea or reduced exercise tolerance.  ABDOMEN: Appetite fine. No weight loss. Denies diarrhea, abdominal pain, hematemesis or blood in stool.  URINARY: No flank pain, dysuria or hematuria.  PERIPHERAL VASCULAR: No claudication or cyanosis.  MUSCULOSKELETAL: See above.  NEUROLOGIC: No history of seizures, paralysis, alteration of gait or coordination.  PE:   HEAD: Normocephalic, atraumatic.EYES: PERRL. EOMI.   EARS: TM's intact. Light reflex normal. No retraction or perforation.   NOSE: Mucosa pink. Airway clear.MOUTH & THROAT: No tonsillar enlargement. No pharyngeal erythema or exudate. No stridor.  NODES: No cervical, axillary or inguinal lymph node enlargement.  CHEST: Lungs clear to auscultation.  CARDIOVASCULAR: Normal S1, S2. No rubs, murmurs or gallops.  ABDOMEN: Bowel sounds normal. Not distended. Soft. No tenderness or masses.  MUSCULOSKELETAL: No palpable abnormality  NEUROLOGIC: Cranial Nerves: II-XII grossly intact.  Motor: 5/5 strength major flexors/extensors.  DTR's: Knees, Ankles 2+ and equal bilaterally; downgoing toes.  Sensory: Intact to light touch distally.  Gait & Posture: Normal gait and fine motion. No cerebellar signs.     Impression:Routine health  check  Plan:Lab eval  Rec diet and ex recs  Rev age appropriate screenings

## 2018-04-27 ENCOUNTER — OFFICE VISIT (OUTPATIENT)
Dept: FAMILY MEDICINE | Facility: CLINIC | Age: 43
End: 2018-04-27
Payer: COMMERCIAL

## 2018-04-27 ENCOUNTER — HOSPITAL ENCOUNTER (OUTPATIENT)
Dept: RADIOLOGY | Facility: HOSPITAL | Age: 43
Discharge: HOME | End: 2018-04-27
Attending: NURSE PRACTITIONER
Payer: COMMERCIAL

## 2018-04-27 VITALS
BODY MASS INDEX: 34.06 KG/M2 | DIASTOLIC BLOOD PRESSURE: 77 MMHG | SYSTOLIC BLOOD PRESSURE: 122 MMHG | TEMPERATURE: 99 F | HEART RATE: 72 BPM | WEIGHT: 217 LBS | HEIGHT: 67 IN

## 2018-04-27 DIAGNOSIS — J01.40 ACUTE NON-RECURRENT PANSINUSITIS: ICD-10-CM

## 2018-04-27 DIAGNOSIS — J20.9 ACUTE BRONCHITIS, UNSPECIFIED ORGANISM: ICD-10-CM

## 2018-04-27 DIAGNOSIS — J20.9 ACUTE BRONCHITIS, UNSPECIFIED ORGANISM: Primary | ICD-10-CM

## 2018-04-27 PROCEDURE — 99999 PR PBB SHADOW E&M-EST. PATIENT-LVL III: CPT | Mod: PBBFAC,,, | Performed by: NURSE PRACTITIONER

## 2018-04-27 PROCEDURE — 99213 OFFICE O/P EST LOW 20 MIN: CPT | Mod: S$GLB,,, | Performed by: NURSE PRACTITIONER

## 2018-04-27 PROCEDURE — 71046 X-RAY EXAM CHEST 2 VIEWS: CPT | Mod: 26,,, | Performed by: RADIOLOGY

## 2018-04-27 PROCEDURE — 71046 X-RAY EXAM CHEST 2 VIEWS: CPT | Mod: TC,PO

## 2018-04-27 RX ORDER — AMOXICILLIN AND CLAVULANATE POTASSIUM 875; 125 MG/1; MG/1
1 TABLET, FILM COATED ORAL EVERY 12 HOURS
Qty: 20 TABLET | Refills: 0 | Status: SHIPPED | OUTPATIENT
Start: 2018-04-27 | End: 2018-05-07

## 2018-04-27 RX ORDER — PREDNISONE 20 MG/1
20 TABLET ORAL 2 TIMES DAILY
Qty: 6 TABLET | Refills: 0 | Status: SHIPPED | OUTPATIENT
Start: 2018-04-27 | End: 2018-04-30

## 2018-04-27 RX ORDER — CODEINE PHOSPHATE AND GUAIFENESIN 10; 100 MG/5ML; MG/5ML
10 SOLUTION ORAL EVERY 4 HOURS PRN
Qty: 240 ML | Refills: 0 | Status: SHIPPED | OUTPATIENT
Start: 2018-04-27 | End: 2018-05-07

## 2018-04-27 NOTE — PROGRESS NOTES
"Subjective:      Patient ID: Danielle Alarcon is a 42 y.o. female.    Chief Complaint: No chief complaint on file.    Sinus Problem   The current episode started in the past 7 days (Wednesday). The problem has been gradually worsening since onset. Maximum temperature: 99.8F. The fever has been present for less than 1 day. The pain is moderate. Associated symptoms include chills, congestion, coughing, headaches, a hoarse voice, sinus pressure and a sore throat (from coughing). Pertinent negatives include no shortness of breath. Past treatments include oral decongestants and acetaminophen (warm compresses to sinuses). The treatment provided mild relief.     Review of Systems   Constitutional: Positive for chills and fever. Negative for fatigue.   HENT: Positive for congestion, hoarse voice, postnasal drip, rhinorrhea, sinus pain, sinus pressure and sore throat (from coughing).         Dental pain from sinuses   Respiratory: Positive for cough, chest tightness (chest congestion) and wheezing. Negative for shortness of breath.    Cardiovascular: Negative for chest pain, palpitations and leg swelling.   Skin: Negative for rash and wound.   Neurological: Positive for headaches. Negative for weakness and numbness.   Psychiatric/Behavioral: The patient is not nervous/anxious.        Objective:     /77   Pulse 72   Temp 98.6 °F (37 °C) (Oral)   Ht 5' 7" (1.702 m)   Wt 98.4 kg (217 lb)   BMI 33.99 kg/m²     Physical Exam   Constitutional: She appears well-developed and well-nourished.   HENT:   Head: Normocephalic.   Right Ear: Tympanic membrane normal.   Left Ear: Tympanic membrane normal.   Nose: Mucosal edema and rhinorrhea (nasal mucosa erythematous and boggy) present. Right sinus exhibits maxillary sinus tenderness and frontal sinus tenderness. Left sinus exhibits maxillary sinus tenderness and frontal sinus tenderness.   Mouth/Throat: Posterior oropharyngeal erythema (clear, post nasal drainage noted to " posterior oropharynx) present. No oropharyngeal exudate or posterior oropharyngeal edema.   Cerumen to bilateral canals   Eyes: Conjunctivae and lids are normal. Pupils are equal, round, and reactive to light.   Neck: Normal range of motion and full passive range of motion without pain. Neck supple.   Cardiovascular: Normal rate, regular rhythm and normal heart sounds.    Pulmonary/Chest: Effort normal. No respiratory distress. She has no decreased breath sounds. She has wheezes in the right upper field, the right middle field and the right lower field. She has no rhonchi. She has no rales.   Lymphadenopathy:     She has no cervical adenopathy.   Skin: Skin is warm and dry. No rash noted.   Psychiatric: She has a normal mood and affect. Her behavior is normal. Judgment and thought content normal.   Narrative     EXAMINATION:  XR CHEST PA AND LATERAL    CLINICAL HISTORY:  Acute bronchitis, unspecified    TECHNIQUE:  PA and lateral views of the chest were performed.    COMPARISON:  July 12, 2017, CT chest July 13, 2017    FINDINGS:  Heart and pulmonary vasculature within normal limits.  There is stable asymmetric increased density right paratracheal location.  This noted to be asymmetric increased sclerosis/density within the a clavicular head.    Lungs are symmetrically aerated and clear.  CP angles are sharp.  Remaining osseous structures intact.  Minimal spondylosis.   Impression       No acute infiltrate.      Electronically signed by: Luke Ramos MD  Date: 04/27/2018  Time: 09:18       Assessment:     1. Acute bronchitis, unspecified organism    2. Acute non-recurrent pansinusitis        Plan:     Problem List Items Addressed This Visit     None      Visit Diagnoses     Acute bronchitis, unspecified organism    -  Primary    Relevant Orders    X-Ray Chest PA And Lateral (Completed)    Acute non-recurrent pansinusitis          Continue Albuterol as prescribed  Stable abnormality at the right clavicular head  compared to CT in 2017    Follow-up if symptoms worsen or fail to improve.

## 2018-04-27 NOTE — PATIENT INSTRUCTIONS
Acute Bacterial Rhinosinusitis (ABRS)    Acute bacterial rhinosinusitis (ABRS) is an infection of your nasal cavity and sinuses. Its caused by bacteria. Acute means that youve had symptoms for less than 12 weeks.  Understanding your sinuses  The nasal cavity is the large air-filled space behind your nose. The sinuses are a group of spaces formed by the bones of your face. They connect with your nasal cavity. ABRS causes the tissue lining these spaces to become inflamed. Mucus may not drain normally. This leads to facial pain and other symptoms.  What causes ABRS?  ABRS most often follows an upper respiratory infection caused by a virus. Bacteria then infect the lining of your nasal cavity and sinuses. But you can also get ABRS if you have:  · Nasal allergies  · Long-term nasal swelling and congestion not caused by allergies  · Blockage in the nose  Symptoms of ABRS  The symptoms of ABRS may be different for each person, and can include:  · Nasal congestion  · Runny nose  · Fluid draining from the nose down the throat (postnasal drip)  · Headache  · Cough  · Pain in the sinuses  · Thick, colored fluid from the nose (mucus)  · Fever  Diagnosing ABRS  ABRS may be diagnosed if youve had an upper respiratory infection like a cold and cough for longer than 10 to 14 days. Your health care provider will ask about your symptoms and your medical history. The provider will check your vital signs, including your temperature. Youll have a physical exam. The health care provider will check your ears, nose, and throat. You likely wont need any tests. If ABRS comes back, you may have a culture or other tests.  Treatment for ABRS  Treatment may include:  · Antibiotic medicine. This is for symptoms that last for at least 10 to 14 days.  · Nasal corticosteroid medicine. Drops or spray used in the nose can lessen swelling and congestion.  · Over-the-counter pain medicine. This is to lessen sinus pain and pressure.  · Nasal  decongestant medicine. Spray or drops may help to lessen congestion. Do not use them for more than a few days.  · Salt wash (saline irrigation). This can help to loosen mucus.  Possible complications of ABRS  ABRS may come back or become long-term (chronic).  In rare cases, ABRS may cause complications such as:   · Inflamed tissue around the brain and spinal cord (meningitis)  · Inflamed tissue around the eyes (orbital cellulitis)  · Inflamed bones around the sinuses (osteitis)  These problems may need to be treated in a hospital with intravenous (IV) antibiotic medicine or surgery.  When to call the health care provider  Call your health care provider if you have any of the following:  · Symptoms that dont get better, or get worse  · Symptoms that dont get better after 3 to 5 days on antibiotics  · Trouble seeing  · Swelling around your eyes  · Confusion or trouble staying awake   Date Last Reviewed: 3/3/2015  © 2043-2775 [employer]. [address] [place] [address]. All rights reserved. This information is not intended as a substitute for professional medical care. Always follow your healthcare professional's instructions.        What Is Acute Bronchitis?  Acute bronchitis is when the airways in your lungs (bronchial tubes) become red and swollen (inflamed). It is usually caused by a viral infection. But it can also occur because of a bacteria or allergen. Symptoms include a cough that produces yellow or greenish mucus and can last for days or sometimes weeks.  Inside healthy lungs    Air travels in and out of the lungs through the airways. The linings of these airways produce sticky mucus. This mucus traps particles that enter the lungs. Tiny structures called cilia then sweep the particles out of the airways.     Healthy airway: Airways are normally open. Air moves in and out easily.      Healthy cilia: Tiny, hairlike cilia sweep mucus and particles up and out of the airways.   Lungs  with bronchitis  Bronchitis often occurs with a cold or the flu virus. The airways become inflamed (red and swollen). There is a deep hacking cough from the extra mucus. Other symptoms may include:  · Wheezing  · Chest discomfort  · Shortness of breath  · Mild fever  A second infection, this time due to bacteria, may then occur. And airways irritated by allergens or smoke are more likely to get infected.        Inflamed airway: Inflammation and extra mucus narrow the airway, causing shortness of breath.      Impaired cilia: Extra mucus impairs cilia, causing congestion and wheezing. Smoking makes the problem worse.   Making a diagnosis  A physical exam, health history, and certain tests help your healthcare provider make the diagnosis.  Health history  Your healthcare provider will ask you about your symptoms.  The exam  Your provider listens to your chest for signs of congestion. He or she may also check your ears, nose, and throat.  Possible tests  · A sputum test for bacteria. This requires a sample of mucus from your lungs.  · A nasal or throat swab. This tests to see if you have a bacterial infection.  · A chest X-ray. This is done if your healthcare provider thinks you have pneumonia.  · Tests to check for an underlying condition. Other tests may be done to check for things such as allergies, asthma, or COPD (chronic obstructive pulmonary disease). You may need to see a specialist for more lung function testing.  Treating a cough  The main treatment for bronchitis is easing symptoms. Avoiding smoke, allergens, and other things that trigger coughing can often help. If the infection is bacterial, you may be given antibiotics. During the illness, it's important to get plenty of sleep. To ease symptoms:  · Dont smoke. Also avoid secondhand smoke.  · Use a humidifier. Or try breathing in steam from a hot shower. This may help loosen mucus.  · Drink a lot of water and juice. They can soothe the throat and may help  thin mucus.  · Sit up or use extra pillows when in bed. This helps to lessen coughing and congestion.  · Ask your provider about using medicine. Ask about using cough medicine, pain and fever medicine, or a decongestant.  Antibiotics  Most cases of bronchitis are caused by cold or flu viruses. They dont need antibiotics to treat them, even if your mucus is thick and green or yellow. Antibiotics dont treat viral illness and antibiotics have not been shown to have any benefit in cases of acute bronchitis. Taking antibiotics when they are not needed increases your risk of getting an infection later that is antibiotic-resistant. Antibiotics can also cause severe cases of diarrhea that require other antibiotics to treat.  It is important that you accept your healthcare provider's opinion to not use antibiotics. Your provider will prescribe antibiotics if the infection is caused by bacteria. If they are prescribed:  · Take all of the medicine. Take the medicine until it is used up, even if symptoms have improved. If you dont, the bronchitis may come back.  · Take the medicines as directed. For instance, some medicines should be taken with food.  · Ask about side effects. Ask your provider or pharmacist what side effects are common, and what to do about them.  Follow-up care  You should see your provider again in 2 to 3 weeks. By this time, symptoms should have improved. An infection that lasts longer may mean you have a more serious problem.  Prevention  · Avoid tobacco smoke. If you smoke, quit. Stay away from smoky places. Ask friends and family not to smoke around you, or in your home or car.  · Get checked for allergies.  · Ask your provider about getting a yearly flu shot. Also ask about pneumococcal or pneumonia shots.  · Wash your hands often. This helps reduce the chance of picking up viruses that cause colds and flu.  Call your healthcare provider if:  · Symptoms worsen, or you have new symptoms  · Breathing  problems worsen or  become severe  · Symptoms dont get better within a week, or within 3 days of taking antibiotics   Date Last Reviewed: 2/1/2017  © 3466-1303 [employer]. [address] [place] [address], [place] [address]. All rights reserved. This information is not intended as a substitute for professional medical care. Always follow your healthcare professional's instructions.

## 2018-06-04 RX ORDER — LISINOPRIL 2.5 MG/1
2.5 TABLET ORAL DAILY
Qty: 90 TABLET | Refills: 3 | Status: SHIPPED | OUTPATIENT
Start: 2018-06-04 | End: 2019-03-08

## 2018-12-07 RX ORDER — PANTOPRAZOLE SODIUM 40 MG/1
TABLET, DELAYED RELEASE ORAL
Qty: 90 TABLET | Refills: 3 | Status: SHIPPED | OUTPATIENT
Start: 2018-12-07 | End: 2019-12-02 | Stop reason: SDUPTHER

## 2018-12-17 ENCOUNTER — PATIENT OUTREACH (OUTPATIENT)
Dept: ADMINISTRATIVE | Facility: HOSPITAL | Age: 43
End: 2018-12-17

## 2018-12-17 ENCOUNTER — PATIENT MESSAGE (OUTPATIENT)
Dept: ADMINISTRATIVE | Facility: HOSPITAL | Age: 43
End: 2018-12-17

## 2019-02-22 ENCOUNTER — PATIENT OUTREACH (OUTPATIENT)
Dept: ADMINISTRATIVE | Facility: HOSPITAL | Age: 44
End: 2019-02-22

## 2019-02-22 NOTE — PROGRESS NOTES
Health Maintenance reviewed. Pre visit chart audit letter sent. Mammogram and Lipid panel order pended. Pre visit chart audit letter sent.

## 2019-02-24 ENCOUNTER — PATIENT MESSAGE (OUTPATIENT)
Dept: ADMINISTRATIVE | Facility: HOSPITAL | Age: 44
End: 2019-02-24

## 2019-02-25 ENCOUNTER — TELEPHONE (OUTPATIENT)
Dept: FAMILY MEDICINE | Facility: CLINIC | Age: 44
End: 2019-02-25

## 2019-02-25 DIAGNOSIS — Z00.00 ROUTINE HEALTH MAINTENANCE: Primary | ICD-10-CM

## 2019-03-05 LAB
BASOPHILS # BLD AUTO: 30 CELLS/UL (ref 0–200)
BASOPHILS NFR BLD AUTO: 0.5 %
EOSINOPHIL # BLD AUTO: 252 CELLS/UL (ref 15–500)
EOSINOPHIL NFR BLD AUTO: 4.2 %
ERYTHROCYTE [DISTWIDTH] IN BLOOD BY AUTOMATED COUNT: 13 % (ref 11–15)
HCT VFR BLD AUTO: 38.7 % (ref 35–45)
HGB BLD-MCNC: 13.1 G/DL (ref 11.7–15.5)
LYMPHOCYTES # BLD AUTO: 1770 CELLS/UL (ref 850–3900)
LYMPHOCYTES NFR BLD AUTO: 29.5 %
MCH RBC QN AUTO: 29.7 PG (ref 27–33)
MCHC RBC AUTO-ENTMCNC: 33.9 G/DL (ref 32–36)
MCV RBC AUTO: 87.8 FL (ref 80–100)
MONOCYTES # BLD AUTO: 312 CELLS/UL (ref 200–950)
MONOCYTES NFR BLD AUTO: 5.2 %
NEUTROPHILS # BLD AUTO: 3636 CELLS/UL (ref 1500–7800)
NEUTROPHILS NFR BLD AUTO: 60.6 %
PLATELET # BLD AUTO: 139 THOUSAND/UL (ref 140–400)
PMV BLD REES-ECKER: 12 FL (ref 7.5–12.5)
RBC # BLD AUTO: 4.41 MILLION/UL (ref 3.8–5.1)
TSH SERPL-ACNC: 0.75 MIU/L
WBC # BLD AUTO: 6 THOUSAND/UL (ref 3.8–10.8)

## 2019-03-08 ENCOUNTER — OFFICE VISIT (OUTPATIENT)
Dept: FAMILY MEDICINE | Facility: CLINIC | Age: 44
End: 2019-03-08
Payer: COMMERCIAL

## 2019-03-08 VITALS
SYSTOLIC BLOOD PRESSURE: 144 MMHG | BODY MASS INDEX: 37.42 KG/M2 | HEART RATE: 71 BPM | WEIGHT: 238.38 LBS | TEMPERATURE: 98 F | DIASTOLIC BLOOD PRESSURE: 87 MMHG | HEIGHT: 67 IN

## 2019-03-08 DIAGNOSIS — Z00.00 ANNUAL PHYSICAL EXAM: Primary | ICD-10-CM

## 2019-03-08 DIAGNOSIS — D21.9 FIBROIDS: ICD-10-CM

## 2019-03-08 DIAGNOSIS — I10 ESSENTIAL HYPERTENSION: ICD-10-CM

## 2019-03-08 DIAGNOSIS — Z12.31 ENCOUNTER FOR SCREENING MAMMOGRAM FOR BREAST CANCER: ICD-10-CM

## 2019-03-08 DIAGNOSIS — K21.9 GASTROESOPHAGEAL REFLUX DISEASE WITHOUT ESOPHAGITIS: ICD-10-CM

## 2019-03-08 PROCEDURE — 99396 PR PREVENTIVE VISIT,EST,40-64: ICD-10-PCS | Mod: S$GLB,,, | Performed by: FAMILY MEDICINE

## 2019-03-08 PROCEDURE — 99999 PR PBB SHADOW E&M-EST. PATIENT-LVL III: CPT | Mod: PBBFAC,,, | Performed by: FAMILY MEDICINE

## 2019-03-08 PROCEDURE — 99396 PREV VISIT EST AGE 40-64: CPT | Mod: S$GLB,,, | Performed by: FAMILY MEDICINE

## 2019-03-08 PROCEDURE — 99999 PR PBB SHADOW E&M-EST. PATIENT-LVL III: ICD-10-PCS | Mod: PBBFAC,,, | Performed by: FAMILY MEDICINE

## 2019-03-08 RX ORDER — IRBESARTAN 150 MG/1
150 TABLET ORAL NIGHTLY
Qty: 90 TABLET | Refills: 3 | Status: SHIPPED | OUTPATIENT
Start: 2019-03-08 | End: 2019-12-02 | Stop reason: SDUPTHER

## 2019-03-08 RX ORDER — FERROUS SULFATE 325(65) MG
325 TABLET, DELAYED RELEASE (ENTERIC COATED) ORAL DAILY
COMMUNITY
End: 2020-05-25

## 2019-03-08 NOTE — PROGRESS NOTES
Subjective:      Patient ID: Danielle Alarcon is a 43 y.o. female.    Chief Complaint: Annual Exam    Problem List Items Addressed This Visit     Essential hypertension    Overview     The patient presents with essential hypertension.  The patient is tolerating the medication well and is in excellent compliance.  The patient is experiencing no side effects.  Counseling was offered regarding low salt diets.  The patient has a reduced salt intake.  The patient denies chest pain, palpitations, shortness of breath, dyspnea on exertion, left or murmur neck pain, nausea, vomiting, diaphoresis, paroxysmal nocturnal dyspnea, and orthopnea.   Hypertension Medications       She is on lotensin and we are going to stop this on her due to risk.           Fibroids    Gastroesophageal reflux disease without esophagitis    Overview     The patient presents with GERD.  Denies chest pain, nausea & vomiting, belching, cramping, distention, dyspepsia, dysphagia, hematochezia, melena, abdominal pain and weight loss.  Current treatment has included medications that are listed in medications list with significant response.  There has been no medicine intolerance.  The patient cannot identify any exacerbating factors.  Avoidance of NSAID's, ASA, carbonated beverages and spicy food was discussed.             Other Visit Diagnoses     Annual physical exam    -  Primary    Encounter for screening mammogram for breast cancer          She has fibroids of the uterus and she is following with a GYN doctor at [facility]. She has an appt soon to have another ultrasound of her abdomen. They have not recommen ded surgery at this point but she has not been checked since August 2018.  She is going to get this done soon. she is not having symptoms.  She has regular menses.    Past Medical History:  Past Medical History:   Diagnosis Date    Bronchial pneumonia     2008    Depression     Gestational diabetes      Past Surgical History:   Procedure  Laterality Date    CHOLECYSTECTOMY      CHOLECYSTECTOMY, LAPAROSCOPIC N/A 10/7/2013    Performed by Jet [facility] at [facility]    TONSILLECTOMY       Review of patient's allergies indicates:  No Known Allergies  Current Outpatient Medications on File Prior to Visit   Medication Sig Dispense Refill    ferrous sulfate 325 (65 FE) MG EC tablet Take 325 mg by mouth once daily.      ibuprofen (ADVIL,MOTRIN) 200 MG tablet Take 200 mg by mouth every 6 (six) hours as needed.       pantoprazole (PROTONIX) 40 MG tablet TAKE ONE TABLET BY MOUTH ONCE DAILY 90 tablet 3    TURMERIC ORAL Take 1 tablet by mouth once daily.      [DISCONTINUED] lisinopril (PRINIVIL,ZESTRIL) 2.5 MG tablet Take 1 tablet (2.5 mg total) by mouth once daily. 90 tablet 3    [DISCONTINUED] FLAXSEED OIL MISC 2 tablets by Misc.(Non-Drug; Combo Route) route every morning. 2 TABS       No current facility-administered medications on file prior to visit.      Social History     Socioeconomic History    Marital status:      Spouse name: Not on file    Number of children: Not on file    Years of education: Not on file    Highest education level: Not on file   Social Needs    Financial resource strain: Not on file    Food insecurity - worry: Not on file    Food insecurity - inability: Not on file    Transportation needs - medical: Not on file    Transportation needs - non-medical: Not on file   Occupational History    Not on file   Tobacco Use    Smoking status: Former Smoker   Substance and Sexual Activity    Alcohol use: No    Drug use: No    Sexual activity: Not on file   Other Topics Concern    Not on file   Social History Narrative    Not on file     Family History   Problem Relation Age of Onset    Diabetes Mother     Hypertension Mother     Hyperlipidemia Mother     Hyperlipidemia Father     Hypertension Father     Diabetes Brother        Review of Systems   Constitutional: Negative for fatigue, fever and unexpected  "weight change.   HENT: Negative for congestion, ear pain, postnasal drip and sore throat.    Eyes: Negative for visual disturbance.   Respiratory: Negative for cough, chest tightness, shortness of breath and wheezing.    Cardiovascular: Negative for chest pain, palpitations and leg swelling.   Gastrointestinal: Negative for abdominal pain, blood in stool, constipation, diarrhea, nausea and vomiting.   Genitourinary: Negative for dysuria and hematuria.   Neurological: Negative for weakness and numbness.       Objective:     BP (!) 144/87   Pulse 71   Temp 98.4 °F (36.9 °C) (Oral)   Ht 5' 7" (1.702 m)   Wt 108.1 kg (238 lb 6.4 oz)   BMI 37.34 kg/m²     Physical Exam   Constitutional: She appears well-developed and well-nourished. She is cooperative.   HENT:   Head: Normocephalic and atraumatic.   Right Ear: Tympanic membrane, external ear and ear canal normal.   Left Ear: Tympanic membrane, external ear and ear canal normal.   Nose: Nose normal.   Mouth/Throat: Uvula is midline and mucous membranes are normal. No oral lesions. No oropharyngeal exudate, posterior oropharyngeal edema or posterior oropharyngeal erythema.   Eyes: EOM and lids are normal. Pupils are equal, round, and reactive to light. Right eye exhibits no discharge. Left eye exhibits no discharge. Right conjunctiva is not injected. Right conjunctiva has no hemorrhage. Left conjunctiva is not injected. Left conjunctiva has no hemorrhage. No scleral icterus. Right eye exhibits no nystagmus. Left eye exhibits no nystagmus.   Neck: Normal range of motion and full passive range of motion without pain. Neck supple. No JVD present. No tracheal tenderness present. Carotid bruit is not present. No tracheal deviation present. No thyroid mass and no thyromegaly present.   Cardiovascular: Normal rate, regular rhythm, S1 normal and S2 normal.   No murmur heard.  Pulses:       Carotid pulses are 2+ on the right side, and 2+ on the left side.       Radial pulses " are 2+ on the right side, and 2+ on the left side.        Posterior tibial pulses are 2+ on the right side, and 2+ on the left side.   Pulmonary/Chest: Effort normal and breath sounds normal. No respiratory distress. She has no wheezes. She has no rhonchi. She has no rales.   Abdominal: Soft. Normal appearance, normal aorta and bowel sounds are normal. She exhibits mass (she has a mass that is tender on palpation that is in the low abdomen and extends 3 cm above the umbilicus). She exhibits no distension, no abdominal bruit and no pulsatile midline mass. There is no hepatosplenomegaly. There is no tenderness. There is no rebound.   Musculoskeletal:        Right knee: She exhibits no swelling. No tenderness found.        Left knee: She exhibits no swelling. No tenderness found.   Lymphadenopathy:        Head (right side): No submental and no submandibular adenopathy present.        Head (left side): No submental and no submandibular adenopathy present.     She has no cervical adenopathy.   Neurological: She is alert. She has normal strength. No cranial nerve deficit or sensory deficit.   Skin: Skin is warm and dry. No rash noted. No cyanosis. Nails show no clubbing.   Psychiatric: She has a normal mood and affect. Her speech is normal and behavior is normal. Thought content normal. Cognition and memory are normal.     Assessment:     1. Annual physical exam    2. Encounter for screening mammogram for breast cancer    3. Essential hypertension    4. Gastroesophageal reflux disease without esophagitis    5. Fibroids        Plan:     Problem List Items Addressed This Visit     Essential hypertension    Relevant Orders    Comprehensive metabolic panel    Fibroids    Gastroesophageal reflux disease without esophagitis      Other Visit Diagnoses     Annual physical exam    -  Primary    Relevant Orders    Mammo Digital Screening Bilateral With CAD    Comprehensive metabolic panel    Encounter for screening mammogram for  breast cancer          get the uterine mass rechecked with her GYN doc soon.   She is getting a pap smear soon and she will be getting this to us when she has a repeat.  Stop lisinopril.   Start irbesartan 300 mg po q day and recheck the bp in 3 weeks with the NP.  No Follow-up on file.

## 2019-03-10 LAB
ALBUMIN SERPL-MCNC: 4.2 G/DL (ref 3.6–5.1)
ALBUMIN/GLOB SERPL: 1.7 (CALC) (ref 1–2.5)
ALP SERPL-CCNC: 80 U/L (ref 33–115)
ALT SERPL-CCNC: 14 U/L (ref 6–29)
AST SERPL-CCNC: 14 U/L (ref 10–35)
BILIRUB SERPL-MCNC: 0.6 MG/DL (ref 0.2–1.2)
BUN SERPL-MCNC: 17 MG/DL (ref 7–25)
BUN/CREAT SERPL: NORMAL (CALC) (ref 6–22)
CALCIUM SERPL-MCNC: 8.9 MG/DL (ref 8.6–10.2)
CHLORIDE SERPL-SCNC: 106 MMOL/L (ref 98–110)
CO2 SERPL-SCNC: 27 MMOL/L (ref 20–32)
CREAT SERPL-MCNC: 0.84 MG/DL (ref 0.5–1.1)
GFRSERPLBLD MDRD-ARVRAT: 84 ML/MIN/1.73M2
GLOBULIN SER CALC-MCNC: 2.5 G/DL (CALC) (ref 1.9–3.7)
GLUCOSE SERPL-MCNC: 91 MG/DL (ref 65–99)
POTASSIUM SERPL-SCNC: 4.2 MMOL/L (ref 3.5–5.3)
PROT SERPL-MCNC: 6.7 G/DL (ref 6.1–8.1)
SODIUM SERPL-SCNC: 141 MMOL/L (ref 135–146)

## 2019-03-11 NOTE — PROGRESS NOTES
The patient has a normal CMP.  Please ask the patient to recheck the CMP in 1 year at the time of the annual physical if it is still indicated.

## 2019-03-18 ENCOUNTER — PATIENT OUTREACH (OUTPATIENT)
Dept: ADMINISTRATIVE | Facility: HOSPITAL | Age: 44
End: 2019-03-18

## 2019-03-18 NOTE — LETTER
March 22, 2019        Santos Yokasta Alarcon  [address] [facility] [address]      Dear Mrs. Alarcon,    You have an upcoming appointment with Pedrito Rendon NP on 3/29/19 8:20 am.      Your chart is indicating you may be due for the following and I will be happy to assist you in scheduling any needed appointments:  Health Maintenance Due   Topic    Mammogram     Lipid Panel     Pap Smear with HPV Cotest      If you have had any of the above done at another facility, please bring the records or information with you so that your record at [facility] will be complete.    We will be happy to assist you with scheduling any necessary appointments or you may contact the [facility] appointment desk at [phone] to schedule at your convenience.   This information was sent to you via your patient portal.  Please check your message portal for important information.      Thank you for choosing [facility] for your healthcare needs,      MILEY Thomas  Care Coordination Department  [facility]  [phone]

## 2019-04-03 NOTE — PROGRESS NOTES
Subjective:       Patient ID: Danielle Alarcon is a 43 y.o. female.    Chief Complaint: Blood Pressure Check    Hypertension   This is a chronic problem. The current episode started more than 1 year ago. The problem is unchanged. The problem is controlled. Pertinent negatives include no chest pain, headaches, palpitations or shortness of breath. Past treatments include angiotensin blockers. The current treatment provides significant improvement.       Review of Systems   Constitutional: Negative for fatigue, fever and unexpected weight change.   HENT: Negative for ear pain and sore throat.    Eyes: Negative for pain and visual disturbance.   Respiratory: Negative for cough and shortness of breath.    Cardiovascular: Negative for chest pain and palpitations.   Gastrointestinal: Negative for abdominal pain, diarrhea and vomiting.   Musculoskeletal: Negative for arthralgias and myalgias.   Skin: Negative for color change and rash.   Neurological: Negative for dizziness and headaches.   Psychiatric/Behavioral: Negative for dysphoric mood and sleep disturbance. The patient is not nervous/anxious.        Vitals:    04/05/19 1500   BP: 117/73   Pulse: 65   Temp: 98.2 °F (36.8 °C)       Objective:     Current Outpatient Medications   Medication Sig Dispense Refill    ferrous sulfate 325 (65 FE) MG EC tablet Take 325 mg by mouth once daily.      ibuprofen (ADVIL,MOTRIN) 200 MG tablet Take 200 mg by mouth every 6 (six) hours as needed.       irbesartan (AVAPRO) 150 MG tablet Take 1 tablet (150 mg total) by mouth every evening. 90 tablet 3    pantoprazole (PROTONIX) 40 MG tablet TAKE ONE TABLET BY MOUTH ONCE DAILY 90 tablet 3    TURMERIC ORAL Take 1 tablet by mouth once daily.       No current facility-administered medications for this visit.        Physical Exam   Constitutional: She is oriented to person, place, and time. She appears well-developed and well-nourished. No distress.   HENT:   Head: Normocephalic and  atraumatic.   Eyes: Pupils are equal, round, and reactive to light. EOM are normal.   Neck: Normal range of motion. Neck supple.   Cardiovascular: Normal rate and regular rhythm.   Pulmonary/Chest: Effort normal and breath sounds normal.   Musculoskeletal: Normal range of motion.   Neurological: She is alert and oriented to person, place, and time.   Skin: Skin is warm and dry. No rash noted.   Psychiatric: She has a normal mood and affect. Judgment normal.   Nursing note and vitals reviewed.      Assessment:       1. Essential hypertension        Plan:   Essential hypertension    continue current medications    No follow-ups on file.    There are no Patient Instructions on file for this visit.

## 2019-04-05 ENCOUNTER — OFFICE VISIT (OUTPATIENT)
Dept: FAMILY MEDICINE | Facility: CLINIC | Age: 44
End: 2019-04-05
Payer: COMMERCIAL

## 2019-04-05 VITALS
HEART RATE: 65 BPM | WEIGHT: 240.19 LBS | BODY MASS INDEX: 37.7 KG/M2 | TEMPERATURE: 98 F | HEIGHT: 67 IN | DIASTOLIC BLOOD PRESSURE: 73 MMHG | SYSTOLIC BLOOD PRESSURE: 117 MMHG

## 2019-04-05 DIAGNOSIS — I10 ESSENTIAL HYPERTENSION: Primary | ICD-10-CM

## 2019-04-05 PROCEDURE — 99213 PR OFFICE/OUTPT VISIT, EST, LEVL III, 20-29 MIN: ICD-10-PCS | Mod: S$GLB,,, | Performed by: NURSE PRACTITIONER

## 2019-04-05 PROCEDURE — 99213 OFFICE O/P EST LOW 20 MIN: CPT | Mod: S$GLB,,, | Performed by: NURSE PRACTITIONER

## 2019-04-05 PROCEDURE — 99999 PR PBB SHADOW E&M-EST. PATIENT-LVL III: CPT | Mod: PBBFAC,,, | Performed by: NURSE PRACTITIONER

## 2019-04-05 PROCEDURE — 99999 PR PBB SHADOW E&M-EST. PATIENT-LVL III: ICD-10-PCS | Mod: PBBFAC,,, | Performed by: NURSE PRACTITIONER

## 2019-10-25 ENCOUNTER — PATIENT OUTREACH (OUTPATIENT)
Dept: ADMINISTRATIVE | Facility: HOSPITAL | Age: 44
End: 2019-10-25

## 2019-10-25 NOTE — LETTER
October 25, 2019        We are seeing Danielle Alarcon, 1975, at [facility]. Marco Adame MD is their primary care physician. To help with our [place] maintenance records could you please send the following:     PATIENT LAST PAP REPORT    Please fax to [facility] at [phone], attention Martha Madsen LPN.    Thank-you in advance for your assistance. If you have any questions or concerns please contact me at [phone].     Martha Madsen LPN  Care Coordination Department  [facility]

## 2019-11-25 ENCOUNTER — OFFICE VISIT (OUTPATIENT)
Dept: OBSTETRICS AND GYNECOLOGY | Facility: CLINIC | Age: 44
End: 2019-11-25
Payer: COMMERCIAL

## 2019-11-25 ENCOUNTER — HOSPITAL ENCOUNTER (OUTPATIENT)
Dept: RADIOLOGY | Facility: HOSPITAL | Age: 44
Discharge: HOME | End: 2019-11-25
Attending: OBSTETRICS & GYNECOLOGY
Payer: COMMERCIAL

## 2019-11-25 VITALS
HEIGHT: 67 IN | DIASTOLIC BLOOD PRESSURE: 68 MMHG | WEIGHT: 237.44 LBS | SYSTOLIC BLOOD PRESSURE: 112 MMHG | BODY MASS INDEX: 37.27 KG/M2

## 2019-11-25 DIAGNOSIS — D21.9 FIBROIDS: Primary | ICD-10-CM

## 2019-11-25 DIAGNOSIS — D21.9 FIBROIDS: ICD-10-CM

## 2019-11-25 PROCEDURE — 76856 US EXAM PELVIC COMPLETE: CPT | Mod: TC,PN

## 2019-11-25 PROCEDURE — 76856 US EXAM PELVIC COMPLETE: CPT | Mod: 26,,, | Performed by: RADIOLOGY

## 2019-11-25 PROCEDURE — 76830 TRANSVAGINAL US NON-OB: CPT | Mod: 26,,, | Performed by: RADIOLOGY

## 2019-11-25 PROCEDURE — 99203 OFFICE O/P NEW LOW 30 MIN: CPT | Mod: S$GLB,,, | Performed by: OBSTETRICS & GYNECOLOGY

## 2019-11-25 PROCEDURE — 76856 US PELVIS COMP WITH TRANSVAG NON-OB (XPD): ICD-10-PCS | Mod: 26,,, | Performed by: RADIOLOGY

## 2019-11-25 PROCEDURE — 76830 TRANSVAGINAL US NON-OB: CPT | Mod: TC,PN

## 2019-11-25 PROCEDURE — 99999 PR PBB SHADOW E&M-EST. PATIENT-LVL III: ICD-10-PCS | Mod: PBBFAC,,, | Performed by: OBSTETRICS & GYNECOLOGY

## 2019-11-25 PROCEDURE — 76830 US PELVIS COMP WITH TRANSVAG NON-OB (XPD): ICD-10-PCS | Mod: 26,,, | Performed by: RADIOLOGY

## 2019-11-25 PROCEDURE — 99203 PR OFFICE/OUTPT VISIT, NEW, LEVL III, 30-44 MIN: ICD-10-PCS | Mod: S$GLB,,, | Performed by: OBSTETRICS & GYNECOLOGY

## 2019-11-25 PROCEDURE — 99999 PR PBB SHADOW E&M-EST. PATIENT-LVL III: CPT | Mod: PBBFAC,,, | Performed by: OBSTETRICS & GYNECOLOGY

## 2019-11-25 NOTE — PROGRESS NOTES
Chief Complaint   Patient presents with    Establish Care    Fibroids    had wwe in April       History of Present Illness: Danielle Alarcon is a 44 y.o. female that presents today 11/25/2019 for   Chief Complaint   Patient presents with    Establish Care    Fibroids    had wwe in April     She reports baseball size fibroids 16 weeks size.  She reports that she is now feeling more pressure and that she is getting more bothered by the fibroids.  She reports IUD mirena that it expires 10/2020.  She reports that was doing extended course pills but had to stop due to HTN. She reports since July she has had intermittent bothersome spotting up to 3 weeks. She reports that her periods are usually 7 days.  She that she was told ablation versus hysterectomy.    Past Medical History:   Diagnosis Date    Bronchial pneumonia     2008    Depression     Gestational diabetes        Past Surgical History:   Procedure Laterality Date    CHOLECYSTECTOMY      TONSILLECTOMY         Current Outpatient Medications   Medication Sig Dispense Refill    ferrous sulfate 325 (65 FE) MG EC tablet Take 325 mg by mouth once daily.      ibuprofen (ADVIL,MOTRIN) 200 MG tablet Take 200 mg by mouth every 6 (six) hours as needed.       irbesartan (AVAPRO) 150 MG tablet Take 1 tablet (150 mg total) by mouth every evening. 90 tablet 3    pantoprazole (PROTONIX) 40 MG tablet TAKE ONE TABLET BY MOUTH ONCE DAILY 90 tablet 3    TURMERIC ORAL Take 1 tablet by mouth once daily.       No current facility-administered medications for this visit.        Review of patient's allergies indicates:  No Known Allergies    Family History   Problem Relation Age of Onset    Diabetes Mother     Hypertension Mother     Hyperlipidemia Mother     Hyperlipidemia Father     Hypertension Father     Diabetes Brother        Social History     Tobacco Use    Smoking status: Former Smoker   Substance Use Topics    Alcohol use: No    Drug use: No  "      OB History    Para Term  AB Living   3 2 2   1     SAB TAB Ectopic Multiple Live Births           2      # Outcome Date GA Lbr Jayy/2nd Weight Sex Delivery Anes PTL Lv   3 Term      Vag-Spont      2 Term      Vag-Spont      1 AB  8w0d              Review of Symptoms:  GENERAL: Denies weight gain or weight loss. Feeling well overall.   SKIN: Denies rash or lesions.   HEAD: Denies head injury or headache.   NODES: Denies enlarged lymph nodes.   CHEST: Denies chest pain or shortness of breath.   CARDIOVASCULAR: Denies palpitations or left sided chest pain.   ABDOMEN: No abdominal pain, constipation, diarrhea, nausea, vomiting or rectal bleeding.   URINARY: No frequency, dysuria, hematuria, or burning on urination.  HEMATOLOGIC: No easy bruisability or excessive bleeding.   MUSCULOSKELETAL: Denies joint pain or swelling.     /68   Ht 5' 7" (1.702 m)   Wt 107.7 kg (237 lb 7 oz)   LMP 2019     ASSESSMENT/PLAN:  Fibroids  -     US Pelvis Comp with Transvag NON-OB (xpd; Future; Expected date: 2019      30 minutes spent today with greater than half in counseling. We discussed mirena, ablation and hysterectomy        "

## 2019-12-02 RX ORDER — PANTOPRAZOLE SODIUM 40 MG/1
TABLET, DELAYED RELEASE ORAL
Qty: 90 TABLET | Refills: 3 | Status: SHIPPED | OUTPATIENT
Start: 2019-12-02 | End: 2020-05-25 | Stop reason: SDUPTHER

## 2019-12-02 RX ORDER — IRBESARTAN 150 MG/1
TABLET ORAL
Qty: 90 TABLET | Refills: 3 | Status: SHIPPED | OUTPATIENT
Start: 2019-12-02 | End: 2020-05-25

## 2019-12-11 ENCOUNTER — OFFICE VISIT (OUTPATIENT)
Dept: OBSTETRICS AND GYNECOLOGY | Facility: CLINIC | Age: 44
End: 2019-12-11
Payer: COMMERCIAL

## 2019-12-11 VITALS
SYSTOLIC BLOOD PRESSURE: 120 MMHG | DIASTOLIC BLOOD PRESSURE: 80 MMHG | HEIGHT: 67 IN | WEIGHT: 242.75 LBS | BODY MASS INDEX: 38.1 KG/M2

## 2019-12-11 DIAGNOSIS — T83.32XA INTRAUTERINE CONTRACEPTIVE DEVICE THREADS LOST, INITIAL ENCOUNTER: ICD-10-CM

## 2019-12-11 DIAGNOSIS — Z01.812 PRE-PROCEDURE LAB EXAM: ICD-10-CM

## 2019-12-11 DIAGNOSIS — N92.6 IRREGULAR PERIODS: ICD-10-CM

## 2019-12-11 DIAGNOSIS — N85.2 ENLARGED UTERUS: Primary | ICD-10-CM

## 2019-12-11 LAB
B-HCG UR QL: NEGATIVE
CTP QC/QA: YES

## 2019-12-11 PROCEDURE — 99999 PR PBB SHADOW E&M-EST. PATIENT-LVL III: CPT | Mod: PBBFAC,,, | Performed by: OBSTETRICS & GYNECOLOGY

## 2019-12-11 PROCEDURE — 99213 PR OFFICE/OUTPT VISIT, EST, LEVL III, 20-29 MIN: ICD-10-PCS | Mod: 25,S$GLB,, | Performed by: OBSTETRICS & GYNECOLOGY

## 2019-12-11 PROCEDURE — 88305 TISSUE EXAM BY PATHOLOGIST: ICD-10-PCS | Mod: 26,,, | Performed by: PATHOLOGY

## 2019-12-11 PROCEDURE — 99999 PR PBB SHADOW E&M-EST. PATIENT-LVL III: ICD-10-PCS | Mod: PBBFAC,,, | Performed by: OBSTETRICS & GYNECOLOGY

## 2019-12-11 PROCEDURE — 88305 TISSUE EXAM BY PATHOLOGIST: CPT | Performed by: PATHOLOGY

## 2019-12-11 PROCEDURE — 58100 PR BIOPSY OF UTERUS LINING: ICD-10-PCS | Mod: S$GLB,,, | Performed by: OBSTETRICS & GYNECOLOGY

## 2019-12-11 PROCEDURE — 99213 OFFICE O/P EST LOW 20 MIN: CPT | Mod: 25,S$GLB,, | Performed by: OBSTETRICS & GYNECOLOGY

## 2019-12-11 PROCEDURE — 81025 POCT URINE PREGNANCY: ICD-10-PCS | Mod: S$GLB,,, | Performed by: OBSTETRICS & GYNECOLOGY

## 2019-12-11 PROCEDURE — 88305 TISSUE EXAM BY PATHOLOGIST: CPT | Mod: 26,,, | Performed by: PATHOLOGY

## 2019-12-11 PROCEDURE — 81025 URINE PREGNANCY TEST: CPT | Mod: S$GLB,,, | Performed by: OBSTETRICS & GYNECOLOGY

## 2019-12-11 PROCEDURE — 58100 BIOPSY OF UTERUS LINING: CPT | Mod: S$GLB,,, | Performed by: OBSTETRICS & GYNECOLOGY

## 2019-12-11 RX ORDER — AMOXICILLIN AND CLAVULANATE POTASSIUM 875; 125 MG/1; MG/1
1 TABLET, FILM COATED ORAL
COMMUNITY
Start: 2019-12-11 | End: 2019-12-16

## 2019-12-11 NOTE — PROGRESS NOTES
Chief Complaint   Patient presents with    iud not seen on US    attempt to find strings       History of Present Illness: Danielle Alarcon is a 44 y.o. female that presents today 2019 for   Chief Complaint   Patient presents with    iud not seen on US    attempt to find strings     She reports 11 weeks sized uterus 3/18 and then 16 in .  She reports normal periods until .  She reports bleeding became more irregular.     Past Medical History:   Diagnosis Date    Bronchial pneumonia         Depression     Gestational diabetes        Past Surgical History:   Procedure Laterality Date    CHOLECYSTECTOMY      TONSILLECTOMY         Current Outpatient Medications   Medication Sig Dispense Refill    amoxicillin-clavulanate 875-125mg (AUGMENTIN) 875-125 mg per tablet Take 1 tablet by mouth.      ferrous sulfate 325 (65 FE) MG EC tablet Take 325 mg by mouth once daily.      ibuprofen (ADVIL,MOTRIN) 200 MG tablet Take 200 mg by mouth every 6 (six) hours as needed.       irbesartan (AVAPRO) 150 MG tablet TAKE ONE TABLET BY MOUTH EVERY EVENING 90 tablet 3    pantoprazole (PROTONIX) 40 MG tablet TAKE ONE TABLET BY MOUTH ONCE DAILY 90 tablet 3    TURMERIC ORAL Take 1 tablet by mouth once daily.       No current facility-administered medications for this visit.        Review of patient's allergies indicates:  No Known Allergies    Family History   Problem Relation Age of Onset    Diabetes Mother     Hypertension Mother     Hyperlipidemia Mother     Hyperlipidemia Father     Hypertension Father     Diabetes Brother        Social History     Tobacco Use    Smoking status: Former Smoker   Substance Use Topics    Alcohol use: No    Drug use: No       OB History    Para Term  AB Living   3 2 2   1     SAB TAB Ectopic Multiple Live Births           2      # Outcome Date GA Lbr Jayy/2nd Weight Sex Delivery Anes PTL Lv   3 Term      Vag-Spont      2 Term      Vag-Spont      1 AB   "8w0d              Review of Symptoms:  GENERAL: Denies weight gain or weight loss. Feeling well overall.   SKIN: Denies rash or lesions.   HEAD: Denies head injury or headache.   NODES: Denies enlarged lymph nodes.   CHEST: Denies chest pain or shortness of breath.   CARDIOVASCULAR: Denies palpitations or left sided chest pain.   ABDOMEN: No abdominal pain, constipation, diarrhea, nausea, vomiting or rectal bleeding.   URINARY: No frequency, dysuria, hematuria, or burning on urination.  HEMATOLOGIC: No easy bruisability or excessive bleeding.   MUSCULOSKELETAL: Denies joint pain or swelling.     /80   Ht 5' 7" (1.702 m)   Wt 110.1 kg (242 lb 11.6 oz)   LMP 11/19/2019   Physical Exam:  APPEARANCE: Well nourished, well developed, in no acute distress.  SKIN: Normal skin turgor, no lesions.  NECK: Neck symmetric without masses   RESPIRATORY: Normal respiratory effort with no retractions or use of accessory muscles  CARDIOVASCULAR: Peripheral vascular system with no swelling no varicosities and palpation of pulses normal  LYMPHATIC: No enlargements of the lymph nodes noted in the neck, axillae, or groin  ABDOMEN: Soft. No tenderness or masses. No hepatosplenomegaly. No hernias.  PELVIC: Normal external female genitalia without lesions. Normal hair distribution. Adequate perineal body, normal urethral meatus. Urethra with no masses.  Bladder nontender. Vagina moist and well rugated without lesions or discharge. Cervix pink and without lesions. No significant cystocele or rectocele. Bimanual exam showed uterus massively enlarged 22 weeks sized ++++   Adnexa without masses or tenderness. Urethra and bladder normal.  EXTREMITIES: No clubbing cyanosis or edema.      ENDOMETRIAL BIOPSY:    Female patient presents for an endometrial biopsy due to abnormal bleeding.      UPT is negative.    PRE ENDOMETRIAL BIOPSY COUNSELING:  The patient was informed of the risk of bleeding, infection, uterine perforation and pain and " that the test will rule-out endometrial cancer with accuracy greater than 95%. She was counseled on the alternatives to endometrial biopsy and agrees to proceed.    PROCEDURE:  TIME OUT PERFORMED.  The cervix was visualized with a speculum.  A single tooth tenaculum was placed on the anterior lip prior to the biopsy.  A sterile endometrial pipelle was passed without difficulty to a depth of 8 cm.  Adequate endometrial tissue was obtained.    The specimen was placed in formalyn and sent to Pathology for histology evaluation.  The patient tolerated the procedure well.    ASSESSMENT:   Abnormal uterine bleeding  626.8.    POST ENDOMETRIAL BIOPSY COUNSELING:  Manage post biopsy cramping with NSAIDs or Tylenol.  Expect spotting or light bleeding for a few days.  Report bleeding heavier than a period, fever > 101.0 F, worsening pain or a foul smelling vaginal discharge.    Counseling lasted approximately 15 minutes and all her questions were answered.    FOLLOW-UP: Pending biopsy results.    ASSESSMENT/PLAN:  Enlarged uterus  -     Specimen to Pathology Gynecology and Obstetrics    Pre-procedure lab exam  -     POCT urine pregnancy    Intrauterine contraceptive device threads lost, initial encounter    Irregular periods      We discussed EDINSON which she is very hesitant to take time off to have done.

## 2019-12-12 DIAGNOSIS — N92.6 IRREGULAR PERIODS: ICD-10-CM

## 2019-12-12 DIAGNOSIS — N85.2 ENLARGED UTERUS: Primary | ICD-10-CM

## 2019-12-12 DIAGNOSIS — D21.9 FIBROIDS: ICD-10-CM

## 2019-12-20 ENCOUNTER — PATIENT MESSAGE (OUTPATIENT)
Dept: OBSTETRICS AND GYNECOLOGY | Facility: CLINIC | Age: 44
End: 2019-12-20

## 2019-12-23 LAB
FINAL PATHOLOGIC DIAGNOSIS: NORMAL
GROSS: NORMAL

## 2020-01-06 ENCOUNTER — PATIENT MESSAGE (OUTPATIENT)
Dept: OBSTETRICS AND GYNECOLOGY | Facility: CLINIC | Age: 45
End: 2020-01-06

## 2020-01-22 ENCOUNTER — OFFICE VISIT (OUTPATIENT)
Dept: OBSTETRICS AND GYNECOLOGY | Facility: CLINIC | Age: 45
End: 2020-01-22
Payer: COMMERCIAL

## 2020-01-22 VITALS
SYSTOLIC BLOOD PRESSURE: 124 MMHG | WEIGHT: 241.88 LBS | HEIGHT: 67 IN | DIASTOLIC BLOOD PRESSURE: 78 MMHG | BODY MASS INDEX: 37.96 KG/M2

## 2020-01-22 DIAGNOSIS — D21.9 FIBROIDS: Primary | ICD-10-CM

## 2020-01-22 DIAGNOSIS — D25.0 FIBROIDS, SUBMUCOSAL: ICD-10-CM

## 2020-01-22 PROCEDURE — 99499 UNLISTED E&M SERVICE: CPT | Mod: S$GLB,,, | Performed by: OBSTETRICS & GYNECOLOGY

## 2020-01-22 PROCEDURE — 99999 PR PBB SHADOW E&M-EST. PATIENT-LVL III: ICD-10-PCS | Mod: PBBFAC,,, | Performed by: OBSTETRICS & GYNECOLOGY

## 2020-01-22 PROCEDURE — 99999 PR PBB SHADOW E&M-EST. PATIENT-LVL III: CPT | Mod: PBBFAC,,, | Performed by: OBSTETRICS & GYNECOLOGY

## 2020-01-22 PROCEDURE — 99499 NO LOS: ICD-10-PCS | Mod: S$GLB,,, | Performed by: OBSTETRICS & GYNECOLOGY

## 2020-01-22 RX ORDER — SODIUM CHLORIDE, SODIUM LACTATE, POTASSIUM CHLORIDE, CALCIUM CHLORIDE 600; 310; 30; 20 MG/100ML; MG/100ML; MG/100ML; MG/100ML
INJECTION, SOLUTION INTRAVENOUS CONTINUOUS
Status: CANCELLED | OUTPATIENT
Start: 2020-01-22

## 2020-01-22 NOTE — PROGRESS NOTES
Chief Complaint   Patient presents with    Pre-op Exam       History of Present Illness: Danielle Alarcon is a 44 y.o. female that presents today 2020 for   Chief Complaint   Patient presents with    Pre-op Exam         Past Medical History:   Diagnosis Date    Bronchial pneumonia         Depression     Gestational diabetes        Past Surgical History:   Procedure Laterality Date    CHOLECYSTECTOMY      TONSILLECTOMY         Current Outpatient Medications   Medication Sig Dispense Refill    ferrous sulfate 325 (65 FE) MG EC tablet Take 325 mg by mouth once daily.      irbesartan (AVAPRO) 150 MG tablet TAKE ONE TABLET BY MOUTH EVERY EVENING 90 tablet 3    pantoprazole (PROTONIX) 40 MG tablet TAKE ONE TABLET BY MOUTH ONCE DAILY 90 tablet 3    TURMERIC ORAL Take 1 tablet by mouth once daily.      chlorhexidine (PERIDEX) 0.12 % solution Use as directed 10 mLs in the mouth or throat 2 (two) times daily.      ibuprofen (ADVIL,MOTRIN) 200 MG tablet Take 200 mg by mouth every 6 (six) hours as needed.       mupirocin (BACTROBAN) 2 % ointment 1 g by Nasal route 2 (two) times daily.       No current facility-administered medications for this visit.        Review of patient's allergies indicates:   Allergen Reactions    Hydrocodone Itching and Nausea Only       Family History   Problem Relation Age of Onset    Diabetes Mother     Hypertension Mother     Hyperlipidemia Mother     Hyperlipidemia Father     Hypertension Father     Diabetes Brother        Social History     Tobacco Use    Smoking status: Former Smoker   Substance Use Topics    Alcohol use: No    Drug use: No       OB History    Para Term  AB Living   3 2 2   1     SAB TAB Ectopic Multiple Live Births           2      # Outcome Date GA Lbr Jayy/2nd Weight Sex Delivery Anes PTL Lv   3 Term      Vag-Spont      2 Term      Vag-Spont      1 AB  8w0d              Review of Symptoms:  GENERAL: Denies weight gain or  "weight loss. Feeling well overall.   SKIN: Denies rash or lesions.   HEAD: Denies head injury or headache.   NODES: Denies enlarged lymph nodes.   CHEST: Denies chest pain or shortness of breath.   CARDIOVASCULAR: Denies palpitations or left sided chest pain.   ABDOMEN: No abdominal pain, constipation, diarrhea, nausea, vomiting or rectal bleeding.   URINARY: No frequency, dysuria, hematuria, or burning on urination.  HEMATOLOGIC: No easy bruisability or excessive bleeding.   MUSCULOSKELETAL: Denies joint pain or swelling.     /78   Ht 5' 7" (1.702 m)   Wt 109.7 kg (241 lb 13.5 oz)   Physical Exam:  APPEARANCE: Well nourished, well developed, in no acute distress.  SKIN: Normal skin turgor, no lesions.  NECK: Neck symmetric without masses   RESPIRATORY: Normal respiratory effort with no retractions or use of accessory muscles  CARDIOVASCULAR: Peripheral vascular system with no swelling no varicosities and palpation of pulses normal  LYMPHATIC: No enlargements of the lymph nodes noted in the neck, axillae, or groin  ABDOMEN: Soft. No tenderness or masses. No hepatosplenomegaly. No hernias.  PELVIC: Normal external female genitalia without lesions. Normal hair distribution. Adequate perineal body, normal urethral meatus. Urethra with no masses.  Bladder nontender. Vagina moist and well rugated without lesions or discharge. Cervix pink and without lesions. No significant cystocele or rectocele. Bimanual exam showed uterus 22+ weeks size   Adnexa without masses or tenderness. Urethra and bladder normal.  EXTREMITIES: No clubbing cyanosis or edema.    ASSESSMENT/PLAN:  Fibroids  -     Full code; Standing  -     Insert peripheral IV; Standing  -     Chlorhexidine (CHG) 2% Wipes; Standing  -     Notify Physician - Potential Need of Opioid Reversal; Standing  -     Diet NPO; Standing  -     X-Ray Chest PA And Lateral; Standing    Fibroids, submucosal    we will move forward with EDINSONH Friday.           "

## 2020-01-28 ENCOUNTER — PATIENT OUTREACH (OUTPATIENT)
Dept: ADMINISTRATIVE | Facility: HOSPITAL | Age: 45
End: 2020-01-28

## 2020-02-11 ENCOUNTER — OFFICE VISIT (OUTPATIENT)
Dept: OBSTETRICS AND GYNECOLOGY | Facility: CLINIC | Age: 45
End: 2020-02-11
Payer: COMMERCIAL

## 2020-02-11 VITALS
WEIGHT: 237.88 LBS | DIASTOLIC BLOOD PRESSURE: 78 MMHG | BODY MASS INDEX: 38.23 KG/M2 | HEIGHT: 66 IN | SYSTOLIC BLOOD PRESSURE: 118 MMHG

## 2020-02-11 DIAGNOSIS — Z09 POSTOP CHECK: Primary | ICD-10-CM

## 2020-02-11 PROCEDURE — 99999 PR PBB SHADOW E&M-EST. PATIENT-LVL III: ICD-10-PCS | Mod: PBBFAC,,, | Performed by: OBSTETRICS & GYNECOLOGY

## 2020-02-11 PROCEDURE — 99024 POSTOP FOLLOW-UP VISIT: CPT | Mod: S$GLB,,, | Performed by: OBSTETRICS & GYNECOLOGY

## 2020-02-11 PROCEDURE — 99024 PR POST-OP FOLLOW-UP VISIT: ICD-10-PCS | Mod: S$GLB,,, | Performed by: OBSTETRICS & GYNECOLOGY

## 2020-02-11 PROCEDURE — 99999 PR PBB SHADOW E&M-EST. PATIENT-LVL III: CPT | Mod: PBBFAC,,, | Performed by: OBSTETRICS & GYNECOLOGY

## 2020-02-11 NOTE — PROGRESS NOTES
"POST-OP NOTE    2020    HPI: no complaints    Past Medical History:   Diagnosis Date    Anesthesia complication     delayed urination    Asthma     allergy induced    Bronchial pneumonia         Depression     GERD (gastroesophageal reflux disease)     Gestational diabetes     History of bronchitis     Hypertension      Past Surgical History:   Procedure Laterality Date    CHOLECYSTECTOMY      TONSILLECTOMY      TOTAL ABDOMINAL HYSTERECTOMY N/A 2020    Procedure: HYSTERECTOMY, TOTAL, ABDOMINAL;  Surgeon: Sonia Bird MD;  Location: [facility];  Service: OB/GYN;  Laterality: N/A;     Current Outpatient Medications   Medication Sig Dispense Refill    ferrous sulfate 325 (65 FE) MG EC tablet Take 325 mg by mouth once daily. HOLD AM OF SURGERY      irbesartan (AVAPRO) 150 MG tablet TAKE ONE TABLET BY MOUTH EVERY EVENING (Patient taking differently: Take 150 mg by mouth once daily. HOLD AM OF SURGERY) 90 tablet 3    multivitamin (ONE DAILY MULTIVITAMIN) per tablet Take 1 tablet by mouth once daily. HOLD AM OF SURGERY      pantoprazole (PROTONIX) 40 MG tablet TAKE ONE TABLET BY MOUTH ONCE DAILY (Patient taking differently: Take 40 mg by mouth once daily. TAKE AM OF SURGERY WITH A SIP OF WATER) 90 tablet 3    TURMERIC ORAL Take 3 capsules by mouth once daily. Hold until after surgery      oxyCODONE-acetaminophen (PERCOCET) 5-325 mg per tablet Take 1 tablet by mouth every 4 (four) hours as needed. (Patient not taking: Reported on 2020) 20 tablet 0     No current facility-administered medications for this visit.      Review of patient's allergies indicates:   Allergen Reactions    Hydrocodone Itching and Nausea Only     Social History     Tobacco Use    Smoking status: Former Smoker     Last attempt to quit: 2006     Years since quittin.1    Smokeless tobacco: Never Used   Substance Use Topics    Alcohol use: No    Drug use: No     /78   Ht 5' 5.5" (1.664 m)   Wt " 107.9 kg (237 lb 14 oz)   LMP  (LMP Unknown)     ROS:  GENERAL: Denies weight gain, weight loss, fatigue, and malaise.   SKIN: Denies rash or lesions.   HEAD: Denies head injury or headache.   NODES: Denies enlarged lymph nodes.   CHEST: Denies chest pain or shortness of breath.   CARDIOVASCULAR: Denies palpitations or left sided chest pain.   ABDOMEN: No abdominal pain, constipation, diarrhea, nausea, vomiting or rectal bleeding.   URINARY: No frequency, urgency, dysuria, or hematuria  MUSCULOSKELETAL: Denies joint pain or swelling.   NEUROLOGIC: Denies seizures.    PE:   APPEARANCE: Well nourished, well developed, in no acute distress.  SKIN: Normal skin turgor, no lesions.  ABDOMEN: Incision healing fine. Soft. No tenderness or masses. No hepatosplenomegaly. No hernias.EXTREMITIES: NO clubbing cyanosis or edema    ASSESSMENT  Encounter Diagnoses   Name Primary?    Postop check Yes       PLAN  1. RTC:4 weeks

## 2020-02-14 ENCOUNTER — TELEPHONE (OUTPATIENT)
Dept: OBSTETRICS AND GYNECOLOGY | Facility: CLINIC | Age: 45
End: 2020-02-14

## 2020-02-14 NOTE — TELEPHONE ENCOUNTER
----- Message from Arden Rivera sent at 2/14/2020 10:22 AM CST -----  Contact: pt  Type:  Sooner Apoointment Request    Caller is requesting a sooner appointment.  Caller declined first available appointment listed below.  Caller will not accept being placed on the waitlist and is requesting a message be sent to doctor.    Name of Caller:  pt  When is the first available appointment?  03/13/2020  Best Call Back Number:  [phone]  Additional Information:  Pt states she needs a post-op appointment on 03/06/2020 to be released to go back to work. Please call to advise.

## 2020-02-14 NOTE — TELEPHONE ENCOUNTER
Called patient back and set up appointment as requested for Post-op. Patient stated thanks for making appointment so she could return to work as scheduled.

## 2020-02-25 ENCOUNTER — PATIENT MESSAGE (OUTPATIENT)
Dept: OBSTETRICS AND GYNECOLOGY | Facility: CLINIC | Age: 45
End: 2020-02-25

## 2020-03-06 ENCOUNTER — OFFICE VISIT (OUTPATIENT)
Dept: OBSTETRICS AND GYNECOLOGY | Facility: CLINIC | Age: 45
End: 2020-03-06
Payer: COMMERCIAL

## 2020-03-06 VITALS — HEIGHT: 66 IN | WEIGHT: 236.75 LBS | BODY MASS INDEX: 38.05 KG/M2

## 2020-03-06 DIAGNOSIS — Z09 POSTOP CHECK: Primary | ICD-10-CM

## 2020-03-06 PROCEDURE — 99024 PR POST-OP FOLLOW-UP VISIT: ICD-10-PCS | Mod: S$GLB,,, | Performed by: OBSTETRICS & GYNECOLOGY

## 2020-03-06 PROCEDURE — 99999 PR PBB SHADOW E&M-EST. PATIENT-LVL II: CPT | Mod: PBBFAC,,, | Performed by: OBSTETRICS & GYNECOLOGY

## 2020-03-06 PROCEDURE — 99999 PR PBB SHADOW E&M-EST. PATIENT-LVL II: ICD-10-PCS | Mod: PBBFAC,,, | Performed by: OBSTETRICS & GYNECOLOGY

## 2020-03-06 PROCEDURE — 99024 POSTOP FOLLOW-UP VISIT: CPT | Mod: S$GLB,,, | Performed by: OBSTETRICS & GYNECOLOGY

## 2020-03-06 NOTE — PROGRESS NOTES
"POST-OP NOTE    3/6/2020    HPI: no complaints    Past Medical History:   Diagnosis Date    Anesthesia complication     delayed urination    Asthma     allergy induced    Bronchial pneumonia         Depression     GERD (gastroesophageal reflux disease)     Gestational diabetes     History of bronchitis     Hypertension      Past Surgical History:   Procedure Laterality Date    CHOLECYSTECTOMY      TONSILLECTOMY      TOTAL ABDOMINAL HYSTERECTOMY N/A 2020    Procedure: HYSTERECTOMY, TOTAL, ABDOMINAL;  Surgeon: Sonia Bird MD;  Location: [facility];  Service: OB/GYN;  Laterality: N/A;     Current Outpatient Medications   Medication Sig Dispense Refill    ferrous sulfate 325 (65 FE) MG EC tablet Take 325 mg by mouth once daily. HOLD AM OF SURGERY      irbesartan (AVAPRO) 150 MG tablet TAKE ONE TABLET BY MOUTH EVERY EVENING (Patient taking differently: Take 150 mg by mouth once daily. HOLD AM OF SURGERY) 90 tablet 3    multivitamin (ONE DAILY MULTIVITAMIN) per tablet Take 1 tablet by mouth once daily. HOLD AM OF SURGERY      pantoprazole (PROTONIX) 40 MG tablet TAKE ONE TABLET BY MOUTH ONCE DAILY (Patient taking differently: Take 40 mg by mouth once daily. TAKE AM OF SURGERY WITH A SIP OF WATER) 90 tablet 3    TURMERIC ORAL Take 3 capsules by mouth once daily. Hold until after surgery      oxyCODONE-acetaminophen (PERCOCET) 5-325 mg per tablet Take 1 tablet by mouth every 4 (four) hours as needed. (Patient not taking: Reported on 2020) 20 tablet 0     No current facility-administered medications for this visit.      Review of patient's allergies indicates:   Allergen Reactions    Hydrocodone Itching and Nausea Only     Social History     Tobacco Use    Smoking status: Former Smoker     Last attempt to quit: 2006     Years since quittin.1    Smokeless tobacco: Never Used   Substance Use Topics    Alcohol use: No    Drug use: No     Ht 5' 5.5" (1.664 m)   Wt 107.4 kg (236 lb " 12.4 oz)   LMP  (LMP Unknown)     ROS:  GENERAL: Denies weight gain, weight loss, fatigue, and malaise.   SKIN: Denies rash or lesions.   HEAD: Denies head injury or headache.   NODES: Denies enlarged lymph nodes.   CHEST: Denies chest pain or shortness of breath.   CARDIOVASCULAR: Denies palpitations or left sided chest pain.   ABDOMEN: No abdominal pain, constipation, diarrhea, nausea, vomiting or rectal bleeding.   URINARY: No frequency, urgency, dysuria, or hematuria  MUSCULOSKELETAL: Denies joint pain or swelling.   NEUROLOGIC: Denies seizures.    PE:   APPEARANCE: Well nourished, well developed, in no acute distress.  SKIN: Normal skin turgor, no lesions.  ABDOMEN: Incision healing fine. Soft. No tenderness or masses. No hepatosplenomegaly. No hernias.  PELVIC: Normal external female genitalia without lesions. Normal hair distribution. Adequate perineal body, normal urethral meatus. Normal palpable bladder and urethra. Vagina moist and well rugated without lesions or discharge.   EXTREMITIES: NO clubbing cyanosis or edema    ASSESSMENT  Encounter Diagnoses   Name Primary?    Postop check Yes       PLAN  1. RTC:prn

## 2020-04-17 ENCOUNTER — PATIENT MESSAGE (OUTPATIENT)
Dept: FAMILY MEDICINE | Facility: CLINIC | Age: 45
End: 2020-04-17

## 2020-05-12 ENCOUNTER — PATIENT MESSAGE (OUTPATIENT)
Dept: FAMILY MEDICINE | Facility: CLINIC | Age: 45
End: 2020-05-12

## 2020-05-12 DIAGNOSIS — Z00.00 ANNUAL PHYSICAL EXAM: Primary | ICD-10-CM

## 2020-05-12 NOTE — TELEPHONE ENCOUNTER
I have signed for the following orders AND/OR meds.  Please call the patient and ask the patient to schedule the testing AND/OR inform about any medications that were sent.      Orders Placed This Encounter   Procedures    Comprehensive metabolic panel     Standing Status:   Future     Number of Occurrences:   1     Standing Expiration Date:   5/12/2021    Lipid Panel     Standing Status:   Future     Number of Occurrences:   1     Standing Expiration Date:   5/12/2021    CBC auto differential     Standing Status:   Future     Number of Occurrences:   1     Standing Expiration Date:   7/11/2021    HIV 1/2 Ag/Ab (4th Gen)     Standing Status:   Future     Number of Occurrences:   1     Standing Expiration Date:   7/12/2021    Iron and TIBC     Standing Status:   Future     Number of Occurrences:   1     Standing Expiration Date:   5/12/2021

## 2020-05-19 DIAGNOSIS — R73.9 HYPERGLYCEMIA: Primary | ICD-10-CM

## 2020-05-19 LAB
ALBUMIN SERPL-MCNC: 4.1 G/DL (ref 3.6–5.1)
ALBUMIN/GLOB SERPL: 1.6 (CALC) (ref 1–2.5)
ALP SERPL-CCNC: 101 U/L (ref 31–125)
ALT SERPL-CCNC: 16 U/L (ref 6–29)
AST SERPL-CCNC: 14 U/L (ref 10–35)
BASOPHILS # BLD AUTO: 71 CELLS/UL (ref 0–200)
BASOPHILS NFR BLD AUTO: 0.8 %
BILIRUB SERPL-MCNC: 0.4 MG/DL (ref 0.2–1.2)
BUN SERPL-MCNC: 24 MG/DL (ref 7–25)
BUN/CREAT SERPL: ABNORMAL (CALC) (ref 6–22)
CALCIUM SERPL-MCNC: 9.2 MG/DL (ref 8.6–10.2)
CHLORIDE SERPL-SCNC: 104 MMOL/L (ref 98–110)
CHOLEST SERPL-MCNC: 183 MG/DL
CHOLEST/HDLC SERPL: 2.7 (CALC)
CO2 SERPL-SCNC: 26 MMOL/L (ref 20–32)
CREAT SERPL-MCNC: 0.92 MG/DL (ref 0.5–1.1)
EOSINOPHIL # BLD AUTO: 303 CELLS/UL (ref 15–500)
EOSINOPHIL NFR BLD AUTO: 3.4 %
ERYTHROCYTE [DISTWIDTH] IN BLOOD BY AUTOMATED COUNT: 13.3 % (ref 11–15)
GFRSERPLBLD MDRD-ARVRAT: 75 ML/MIN/1.73M2
GLOBULIN SER CALC-MCNC: 2.5 G/DL (CALC) (ref 1.9–3.7)
GLUCOSE SERPL-MCNC: 105 MG/DL (ref 65–99)
HCT VFR BLD AUTO: 39 % (ref 35–45)
HDLC SERPL-MCNC: 67 MG/DL
HGB BLD-MCNC: 12.8 G/DL (ref 11.7–15.5)
HIV 1+2 AB+HIV1 P24 AG SERPL QL IA: NORMAL
IRON SATN MFR SERPL: 32 % (CALC) (ref 16–45)
IRON SERPL-MCNC: 105 MCG/DL (ref 40–190)
LDLC SERPL CALC-MCNC: 99 MG/DL (CALC)
LYMPHOCYTES # BLD AUTO: 2617 CELLS/UL (ref 850–3900)
LYMPHOCYTES NFR BLD AUTO: 29.4 %
MCH RBC QN AUTO: 28.6 PG (ref 27–33)
MCHC RBC AUTO-ENTMCNC: 32.8 G/DL (ref 32–36)
MCV RBC AUTO: 87.2 FL (ref 80–100)
MONOCYTES # BLD AUTO: 383 CELLS/UL (ref 200–950)
MONOCYTES NFR BLD AUTO: 4.3 %
NEUTROPHILS # BLD AUTO: 5527 CELLS/UL (ref 1500–7800)
NEUTROPHILS NFR BLD AUTO: 62.1 %
NONHDLC SERPL-MCNC: 116 MG/DL (CALC)
PLATELET # BLD AUTO: 166 THOUSAND/UL (ref 140–400)
PMV BLD REES-ECKER: 11.3 FL (ref 7.5–12.5)
POTASSIUM SERPL-SCNC: 4.3 MMOL/L (ref 3.5–5.3)
PROT SERPL-MCNC: 6.6 G/DL (ref 6.1–8.1)
RBC # BLD AUTO: 4.47 MILLION/UL (ref 3.8–5.1)
SODIUM SERPL-SCNC: 140 MMOL/L (ref 135–146)
TIBC SERPL-MCNC: 326 MCG/DL (CALC) (ref 250–450)
TRIGL SERPL-MCNC: 79 MG/DL
WBC # BLD AUTO: 8.9 THOUSAND/UL (ref 3.8–10.8)

## 2020-05-19 NOTE — TELEPHONE ENCOUNTER
The patient has a high fasting glucose and it may be a borderline diabetic.  Please schedule the patient for a 2 hour glucose challenge along with a hemoglobin A1c.  Schedule the patient to followup with me 1 week after the testing is done.      I have reviewed the lipid panel and it appears to be normal. Please repeat a lipid profile in one year at the time of the annual physical if it is still indicated.    I have reviewed the CBC which is a comprehensive review of the blood count, white count and differential of white cells.  All of the findings are acceptable at this time and no significant changes are noted that would indicate that other testing is indicated based on that.     The iron and iron binding capacity are normal.  This is great.      Orders were placed.

## 2020-05-20 ENCOUNTER — PATIENT OUTREACH (OUTPATIENT)
Dept: ADMINISTRATIVE | Facility: HOSPITAL | Age: 45
End: 2020-05-20

## 2020-05-20 ENCOUNTER — PATIENT MESSAGE (OUTPATIENT)
Dept: FAMILY MEDICINE | Facility: CLINIC | Age: 45
End: 2020-05-20

## 2020-05-20 DIAGNOSIS — Z12.31 ENCOUNTER FOR SCREENING MAMMOGRAM FOR MALIGNANT NEOPLASM OF BREAST: Primary | ICD-10-CM

## 2020-05-20 NOTE — PROGRESS NOTES
Attempted to reach to schedule  annual Mammogram. Pt did not answer left voicemail with callback number.   Health Maintenance Updated.   Immunizations: Abstracted.   Care Everywhere: Abstracted: Results found.     Health Maintenance Due   Topic    Mammogram      MAMMO:DUE

## 2020-05-25 ENCOUNTER — OFFICE VISIT (OUTPATIENT)
Dept: FAMILY MEDICINE | Facility: CLINIC | Age: 45
End: 2020-05-25
Payer: COMMERCIAL

## 2020-05-25 VITALS
SYSTOLIC BLOOD PRESSURE: 120 MMHG | HEART RATE: 70 BPM | WEIGHT: 241 LBS | DIASTOLIC BLOOD PRESSURE: 74 MMHG | BODY MASS INDEX: 39.49 KG/M2 | TEMPERATURE: 99 F

## 2020-05-25 DIAGNOSIS — K21.9 GASTROESOPHAGEAL REFLUX DISEASE WITHOUT ESOPHAGITIS: ICD-10-CM

## 2020-05-25 DIAGNOSIS — R73.9 HYPERGLYCEMIA: ICD-10-CM

## 2020-05-25 DIAGNOSIS — I10 ESSENTIAL HYPERTENSION: ICD-10-CM

## 2020-05-25 DIAGNOSIS — Z00.00 ANNUAL PHYSICAL EXAM: Primary | ICD-10-CM

## 2020-05-25 PROCEDURE — 99396 PR PREVENTIVE VISIT,EST,40-64: ICD-10-PCS | Mod: S$GLB,,, | Performed by: FAMILY MEDICINE

## 2020-05-25 PROCEDURE — 99999 PR PBB SHADOW E&M-EST. PATIENT-LVL III: ICD-10-PCS | Mod: PBBFAC,,, | Performed by: FAMILY MEDICINE

## 2020-05-25 PROCEDURE — 99396 PREV VISIT EST AGE 40-64: CPT | Mod: S$GLB,,, | Performed by: FAMILY MEDICINE

## 2020-05-25 PROCEDURE — 99999 PR PBB SHADOW E&M-EST. PATIENT-LVL III: CPT | Mod: PBBFAC,,, | Performed by: FAMILY MEDICINE

## 2020-05-25 RX ORDER — PANTOPRAZOLE SODIUM 40 MG/1
40 TABLET, DELAYED RELEASE ORAL DAILY
Qty: 90 TABLET | Refills: 3 | Status: SHIPPED | OUTPATIENT
Start: 2020-05-25 | End: 2021-05-26 | Stop reason: SDUPTHER

## 2020-05-25 RX ORDER — IRBESARTAN 150 MG/1
150 TABLET ORAL DAILY
Qty: 90 TABLET | Refills: 3 | Status: SHIPPED | OUTPATIENT
Start: 2020-05-25 | End: 2021-05-26 | Stop reason: SDUPTHER

## 2020-05-25 RX ORDER — IRBESARTAN 150 MG/1
150 TABLET ORAL DAILY
Qty: 90 TABLET | Refills: 3 | Status: CANCELLED | OUTPATIENT
Start: 2020-05-25

## 2020-05-25 NOTE — PROGRESS NOTES
Subjective:      Patient ID: Danielle Alarcon is a 44 y.o. female.    Chief Complaint: Annual Exam    Problem List Items Addressed This Visit     Essential hypertension    Overview     The patient presents with essential hypertension.  The patient is tolerating the medication well and is in excellent compliance.  The patient is experiencing no side effects.  Counseling was offered regarding low salt diets.  The patient has a reduced salt intake.  The patient denies chest pain, palpitations, shortness of breath, dyspnea on exertion, left or murmur neck pain, nausea, vomiting, diaphoresis, paroxysmal nocturnal dyspnea, and orthopnea.        Lab Results   Component Value Date     05/18/2020     01/22/2020    CREATININE 0.92 05/18/2020    CREATININE 0.83 01/22/2020    K 4.3 05/18/2020    K 4.6 01/22/2020              Gastroesophageal reflux disease without esophagitis    Overview     The patient presents with GERD.  Denies chest pain, nausea & vomiting, belching, cramping, distention, dyspepsia, dysphagia, hematochezia, melena, abdominal pain and weight loss.  Current treatment has included medications that are listed in medications list with significant response.  There has been no medicine intolerance.  The patient cannot identify any exacerbating factors.  Avoidance of NSAID's, ASA, carbonated beverages and spicy food was discussed.  She has tried going to every other day dosing and she did not tolerate this so she is taking it daily now.           Hyperglycemia    Overview     She has had hyperglycemia in the past.  This is tract on the patient and the last glucose was a little high.. She and I spoke  Lab Results   Component Value Date     (H) 05/18/2020    GLU 85 01/22/2020    GLU 91 03/09/2019    GLU 89 03/02/2018     (H) 03/07/2017       Lab Results   Component Value Date    LABA1C 5.5 03/07/2017    HGBA1C 5.5 01/22/2020                Other Visit Diagnoses     Annual physical exam     -  Primary          Past Medical History:  Past Medical History:   Diagnosis Date    Anesthesia complication     delayed urination    Asthma     allergy induced    Bronchial pneumonia     2008    Depression     GERD (gastroesophageal reflux disease)     Gestational diabetes     History of bronchitis     Hypertension      Past Surgical History:   Procedure Laterality Date    CHOLECYSTECTOMY      TONSILLECTOMY      TOTAL ABDOMINAL HYSTERECTOMY N/A 1/24/2020    Procedure: HYSTERECTOMY, TOTAL, ABDOMINAL;  Surgeon: Sonia Bird MD;  Location: [facility];  Service: OB/GYN;  Laterality: N/A;     Review of patient's allergies indicates:   Allergen Reactions    Hydrocodone Itching and Nausea Only     Current Outpatient Medications on File Prior to Visit   Medication Sig Dispense Refill    multivitamin (ONE DAILY MULTIVITAMIN) per tablet Take 1 tablet by mouth once daily. HOLD AM OF SURGERY      pantoprazole (PROTONIX) 40 MG tablet TAKE ONE TABLET BY MOUTH ONCE DAILY (Patient taking differently: Take 40 mg by mouth once daily. TAKE AM OF SURGERY WITH A SIP OF WATER) 90 tablet 3    TURMERIC ORAL Take 3 capsules by mouth once daily. Hold until after surgery      [DISCONTINUED] irbesartan (AVAPRO) 150 MG tablet TAKE ONE TABLET BY MOUTH EVERY EVENING (Patient taking differently: Take 150 mg by mouth once daily. HOLD AM OF SURGERY) 90 tablet 3    [DISCONTINUED] ferrous sulfate 325 (65 FE) MG EC tablet Take 325 mg by mouth once daily. HOLD AM OF SURGERY      [DISCONTINUED] oxyCODONE-acetaminophen (PERCOCET) 5-325 mg per tablet Take 1 tablet by mouth every 4 (four) hours as needed. (Patient not taking: Reported on 2/11/2020) 20 tablet 0     No current facility-administered medications on file prior to visit.      Social History     Socioeconomic History    Marital status:      Spouse name: Not on file    Number of children: Not on file    Years of education: Not on file    Highest education  level: Not on file   Occupational History    Not on file   Social Needs    Financial resource strain: Not on file    Food insecurity:     Worry: Not on file     Inability: Not on file    Transportation needs:     Medical: Not on file     Non-medical: Not on file   Tobacco Use    Smoking status: Former Smoker     Last attempt to quit: 2006     Years since quittin.4    Smokeless tobacco: Never Used   Substance and Sexual Activity    Alcohol use: No    Drug use: No    Sexual activity: Not on file   Lifestyle    Physical activity:     Days per week: Not on file     Minutes per session: Not on file    Stress: Not on file   Relationships    Social connections:     Talks on phone: Not on file     Gets together: Not on file     Attends Taoist service: Not on file     Active member of club or organization: Not on file     Attends meetings of clubs or organizations: Not on file     Relationship status: Not on file   Other Topics Concern    Not on file   Social History Narrative    Not on file     Family History   Problem Relation Age of Onset    Diabetes Mother     Hypertension Mother     Hyperlipidemia Mother     Hyperlipidemia Father     Hypertension Father     Diabetes Brother        Review of Systems   Constitutional: Negative for fatigue, fever and unexpected weight change.   HENT: Negative for congestion, ear pain, postnasal drip and sore throat.    Eyes: Negative for visual disturbance.   Respiratory: Negative for cough, chest tightness, shortness of breath and wheezing.    Cardiovascular: Negative for chest pain, palpitations and leg swelling.   Gastrointestinal: Negative for abdominal pain, blood in stool, constipation, diarrhea, nausea and vomiting.   Genitourinary: Negative for dysuria and hematuria.   Neurological: Negative for weakness and numbness.       Objective:     /74   Pulse 70   Temp 99 °F (37.2 °C)   Wt 109.3 kg (241 lb)   LMP  (LMP Unknown)   BMI 39.49 kg/m²      Physical Exam   Constitutional: She appears well-developed and well-nourished. She is cooperative.   HENT:   Head: Normocephalic and atraumatic.   Right Ear: Tympanic membrane, external ear and ear canal normal.   Left Ear: Tympanic membrane, external ear and ear canal normal.   Nose: Nose normal.   Mouth/Throat: Uvula is midline and mucous membranes are normal. No oral lesions. No oropharyngeal exudate, posterior oropharyngeal edema or posterior oropharyngeal erythema.   Eyes: Pupils are equal, round, and reactive to light. EOM and lids are normal. Right eye exhibits no discharge. Left eye exhibits no discharge. Right conjunctiva is not injected. Right conjunctiva has no hemorrhage. Left conjunctiva is not injected. Left conjunctiva has no hemorrhage. No scleral icterus. Right eye exhibits no nystagmus. Left eye exhibits no nystagmus.   Neck: Normal range of motion and full passive range of motion without pain. Neck supple. No JVD present. No tracheal tenderness present. Carotid bruit is not present. No tracheal deviation present. No thyroid mass and no thyromegaly present.   Cardiovascular: Normal rate, regular rhythm, S1 normal and S2 normal.   No murmur heard.  Pulses:       Carotid pulses are 2+ on the right side, and 2+ on the left side.       Radial pulses are 2+ on the right side, and 2+ on the left side.        Posterior tibial pulses are 2+ on the right side, and 2+ on the left side.   Pulmonary/Chest: Effort normal and breath sounds normal. No respiratory distress. She has no wheezes. She has no rhonchi. She has no rales.   Abdominal: Soft. Normal appearance, normal aorta and bowel sounds are normal. She exhibits no distension, no abdominal bruit, no pulsatile midline mass and no mass. There is no hepatosplenomegaly. There is no tenderness. There is no rebound.   Musculoskeletal:        Right knee: She exhibits no swelling. No tenderness found.        Left knee: She exhibits no swelling. No tenderness  found.   Lymphadenopathy:        Head (right side): No submental and no submandibular adenopathy present.        Head (left side): No submental and no submandibular adenopathy present.     She has no cervical adenopathy.   Neurological: She is alert. She has normal strength. No cranial nerve deficit or sensory deficit.   Skin: Skin is warm and dry. No rash noted. No cyanosis. Nails show no clubbing.   Psychiatric: She has a normal mood and affect. Her speech is normal and behavior is normal. Thought content normal. Cognition and memory are normal.     Assessment:     1. Annual physical exam    2. Essential hypertension    3. Gastroesophageal reflux disease without esophagitis    4. Hyperglycemia        Plan:     Problem List Items Addressed This Visit     Essential hypertension    Relevant Medications    irbesartan (AVAPRO) 150 MG tablet    Gastroesophageal reflux disease without esophagitis    Relevant Medications    pantoprazole (PROTONIX) 40 MG tablet    Hyperglycemia      Other Visit Diagnoses     Annual physical exam    -  Primary        No follow-ups on file.      I have changed Danielle Alarcon's pantoprazole and irbesartan. I am also having her maintain her TURMERIC ORAL and multivitamin.    Danielle was seen today for annual exam.    Diagnoses and all orders for this visit:    Annual physical exam    Essential hypertension  -     irbesartan (AVAPRO) 150 MG tablet; Take 1 tablet (150 mg total) by mouth once daily. HOLD AM OF SURGERY    Gastroesophageal reflux disease without esophagitis  -     pantoprazole (PROTONIX) 40 MG tablet; Take 1 tablet (40 mg total) by mouth once daily.    Hyperglycemia    Other orders  -     Cancel: irbesartan (AVAPRO) 150 MG tablet; Take 1 tablet (150 mg total) by mouth once daily. HOLD AM OF SURGERY         The patient was instructed to stop the following meds:  Medications Discontinued During This Encounter   Medication Reason    oxyCODONE-acetaminophen (PERCOCET) 5-325 mg per  tablet Patient no longer taking    ferrous sulfate 325 (65 FE) MG EC tablet Patient no longer taking    irbesartan (AVAPRO) 150 MG tablet Patient no longer taking    oxyCODONE-acetaminophen (PERCOCET) 5-325 mg per tablet Patient no longer taking     No orders of the defined types were placed in this encounter.   get her 2 hour GTT as ordered at [facility].

## 2020-05-26 ENCOUNTER — TELEPHONE (OUTPATIENT)
Dept: FAMILY MEDICINE | Facility: CLINIC | Age: 45
End: 2020-05-26

## 2020-05-26 NOTE — TELEPHONE ENCOUNTER
----- Message from Gaye Benton sent at 5/26/2020  8:13 AM CDT -----  Contact: pt   Pt is currently at [facility] to have blood work done, but they haven't received the orders. Please send that over, and call pt at [phone].            Thanks,  Gaye ALMARAZ

## 2020-05-27 ENCOUNTER — TELEPHONE (OUTPATIENT)
Dept: FAMILY MEDICINE | Facility: CLINIC | Age: 45
End: 2020-05-27

## 2020-05-27 DIAGNOSIS — R73.9 HYPERGLYCEMIA: Primary | ICD-10-CM

## 2020-05-27 NOTE — TELEPHONE ENCOUNTER
I have signed for the following orders AND/OR meds.  Please call the patient and ask the patient to schedule the testing AND/OR inform about any medications that were sent.      Orders Placed This Encounter   Procedures    Glucose, fasting     Standing Status:   Future     Standing Expiration Date:   7/26/2021    Hemoglobin A1C     Standing Status:   Future     Standing Expiration Date:   7/26/2021

## 2020-05-27 NOTE — TELEPHONE ENCOUNTER
----- Message from Rufino Blancas MD sent at 5/27/2020  7:26 AM CDT -----  The a1c is excellent at this time.  This is great news.  Recheck this in 6 months with a glucose level.

## 2020-05-27 NOTE — PROGRESS NOTES
The diagnosis now looking at all labs, is just hyperglycemia or pre-diabetes.  We use a diet to treat this along with weight control and exercise.  This should help prevent the progression to full diabetes but this needs to be tracked.  Recheck an a1c and glucose fasting in 6 months.

## 2020-05-27 NOTE — PROGRESS NOTES
The a1c is excellent at this time.  This is great news.  Recheck this in 6 months with a glucose level.

## 2020-05-28 LAB
GLUCOSE 2H P 75 G GLC PO SERPL-MCNC: 132 MG/DL
GLUCOSE P FAST SERPL-MCNC: 101 MG/DL (ref 65–99)
HBA1C MFR BLD: 5.5 % OF TOTAL HGB
INSULIN SERPL-ACNC: 9.3 UIU/ML
SERVICE CMNT-IMP: ABNORMAL

## 2020-05-29 ENCOUNTER — HOSPITAL ENCOUNTER (OUTPATIENT)
Dept: RADIOLOGY | Facility: HOSPITAL | Age: 45
Discharge: HOME | End: 2020-05-29
Attending: FAMILY MEDICINE
Payer: COMMERCIAL

## 2020-05-29 VITALS — HEIGHT: 66 IN | WEIGHT: 241 LBS | BODY MASS INDEX: 38.73 KG/M2

## 2020-05-29 DIAGNOSIS — Z12.31 ENCOUNTER FOR SCREENING MAMMOGRAM FOR MALIGNANT NEOPLASM OF BREAST: ICD-10-CM

## 2020-05-29 PROCEDURE — 77067 SCR MAMMO BI INCL CAD: CPT | Mod: TC,PO

## 2020-05-29 PROCEDURE — 77063 BREAST TOMOSYNTHESIS BI: CPT | Mod: 26,,, | Performed by: RADIOLOGY

## 2020-05-29 PROCEDURE — 77067 SCR MAMMO BI INCL CAD: CPT | Mod: 26,,, | Performed by: RADIOLOGY

## 2020-05-29 PROCEDURE — 77063 MAMMO DIGITAL SCREENING BILAT WITH TOMOSYNTHESIS_CAD: ICD-10-PCS | Mod: 26,,, | Performed by: RADIOLOGY

## 2020-05-29 PROCEDURE — 77067 MAMMO DIGITAL SCREENING BILAT WITH TOMOSYNTHESIS_CAD: ICD-10-PCS | Mod: 26,,, | Performed by: RADIOLOGY

## 2020-09-30 ENCOUNTER — OFFICE VISIT (OUTPATIENT)
Dept: FAMILY MEDICINE | Facility: CLINIC | Age: 45
End: 2020-09-30
Payer: COMMERCIAL

## 2020-09-30 VITALS
WEIGHT: 242.19 LBS | SYSTOLIC BLOOD PRESSURE: 124 MMHG | HEIGHT: 66 IN | BODY MASS INDEX: 38.92 KG/M2 | HEART RATE: 75 BPM | TEMPERATURE: 99 F | DIASTOLIC BLOOD PRESSURE: 92 MMHG

## 2020-09-30 DIAGNOSIS — F32.A ANXIETY AND DEPRESSION: Primary | ICD-10-CM

## 2020-09-30 DIAGNOSIS — F41.9 ANXIETY AND DEPRESSION: Primary | ICD-10-CM

## 2020-09-30 PROCEDURE — 99999 PR PBB SHADOW E&M-EST. PATIENT-LVL III: CPT | Mod: PBBFAC,,, | Performed by: NURSE PRACTITIONER

## 2020-09-30 PROCEDURE — 99214 OFFICE O/P EST MOD 30 MIN: CPT | Mod: S$GLB,,, | Performed by: NURSE PRACTITIONER

## 2020-09-30 PROCEDURE — 99999 PR PBB SHADOW E&M-EST. PATIENT-LVL III: ICD-10-PCS | Mod: PBBFAC,,, | Performed by: NURSE PRACTITIONER

## 2020-09-30 PROCEDURE — 99214 PR OFFICE/OUTPT VISIT, EST, LEVL IV, 30-39 MIN: ICD-10-PCS | Mod: S$GLB,,, | Performed by: NURSE PRACTITIONER

## 2020-09-30 RX ORDER — DESVENLAFAXINE SUCCINATE 50 MG/1
50 TABLET, EXTENDED RELEASE ORAL DAILY
Qty: 30 TABLET | Refills: 2 | Status: SHIPPED | OUTPATIENT
Start: 2020-09-30 | End: 2020-11-06 | Stop reason: SDUPTHER

## 2020-09-30 NOTE — LETTER
September 30, 2020      [facility]  [id number] [address]  Phone: [phone]  Fax: [phone]       Patient: Danielle Alarcon   YOB: 1975  Date of Visit: 09/30/2020    To Whom It May Concern:    Leslie Alarcon  was at [facility] on 09/30/2020. She may return to work/school on 10/1/2020 with no restrictions. If you have any questions or concerns, or if I can be of further assistance, please do not hesitate to contact me.    Sincerely,    Pedrito Rendon NP

## 2020-09-30 NOTE — PROGRESS NOTES
Subjective:       Patient ID: Danielle Alarcon is a 45 y.o. female.    Chief Complaint: Anxiety and Depression    Depression  Visit Type: initial  Onset of symptoms: 1 to 6 months ago  Progression since onset: gradually worsening  Patient presents with the following symptoms: depressed mood, excessive worry, fatigue, feelings of hopelessness, malaise and nervousness/anxiety.  Frequency of symptoms: constantly   Severity: causing significant distress   Exacerbated by: daughter is 19 and worried over her life choices, works in a cafeteria, concerns about Covid.  Sleep quality: fair  Nighttime awakenings: one to two  Patient has a history of: suicide attempt ( at age 17, treated with Prozac)  Treatment tried: SSRI  Compliance with treatment: good  Improvement on treatment: moderate      Denies suicidal thoughts    Review of Systems   Constitutional: Negative for activity change.   HENT: Negative for hearing loss and trouble swallowing.    Eyes: Negative for discharge.   Respiratory: Negative for chest tightness and wheezing.    Gastrointestinal: Negative for constipation, diarrhea and vomiting.   Genitourinary: Negative for difficulty urinating and hematuria.   Neurological: Negative for headaches.   Psychiatric/Behavioral: Positive for depression and dysphoric mood. The patient is nervous/anxious.        Vitals:    09/30/20 1357   BP: (!) 124/92   Pulse: 75   Temp: 99 °F (37.2 °C)       Objective:     Current Outpatient Medications   Medication Sig Dispense Refill    irbesartan (AVAPRO) 150 MG tablet Take 1 tablet (150 mg total) by mouth once daily. HOLD AM OF SURGERY 90 tablet 3    multivitamin (ONE DAILY MULTIVITAMIN) per tablet Take 1 tablet by mouth once daily. HOLD AM OF SURGERY      pantoprazole (PROTONIX) 40 MG tablet Take 1 tablet (40 mg total) by mouth once daily. 90 tablet 3    TURMERIC ORAL Take 3 capsules by mouth once daily. Hold until after surgery      desvenlafaxine succinate (PRISTIQ) 50 MG  Tb24 Take 1 tablet (50 mg total) by mouth once daily. 30 tablet 2     No current facility-administered medications for this visit.        Physical Exam  Vitals signs and nursing note reviewed.   Constitutional:       General: She is not in acute distress.     Appearance: She is well-developed.   HENT:      Head: Normocephalic and atraumatic.   Eyes:      Pupils: Pupils are equal, round, and reactive to light.   Neck:      Musculoskeletal: Normal range of motion and neck supple.   Cardiovascular:      Rate and Rhythm: Normal rate and regular rhythm.   Pulmonary:      Effort: Pulmonary effort is normal.      Breath sounds: Normal breath sounds.   Musculoskeletal: Normal range of motion.   Skin:     General: Skin is warm and dry.      Findings: No rash.   Neurological:      Mental Status: She is alert and oriented to person, place, and time.   Psychiatric:         Mood and Affect: Mood is anxious and depressed. Affect is tearful.         Thought Content: Thought content normal.         Cognition and Memory: Cognition normal.         Judgment: Judgment normal.         Assessment:       1. Anxiety and depression        Plan:   Anxiety and depression    Other orders  -     desvenlafaxine succinate (PRISTIQ) 50 MG Tb24; Take 1 tablet (50 mg total) by mouth once daily.  Dispense: 30 tablet; Refill: 2        No follow-ups on file.    There are no Patient Instructions on file for this visit.

## 2020-11-06 ENCOUNTER — OFFICE VISIT (OUTPATIENT)
Dept: FAMILY MEDICINE | Facility: CLINIC | Age: 45
End: 2020-11-06
Payer: COMMERCIAL

## 2020-11-06 VITALS
TEMPERATURE: 98 F | HEIGHT: 66 IN | HEART RATE: 71 BPM | DIASTOLIC BLOOD PRESSURE: 88 MMHG | BODY MASS INDEX: 38.47 KG/M2 | SYSTOLIC BLOOD PRESSURE: 136 MMHG | WEIGHT: 239.38 LBS

## 2020-11-06 DIAGNOSIS — F32.A ANXIETY AND DEPRESSION: Primary | ICD-10-CM

## 2020-11-06 DIAGNOSIS — F41.9 ANXIETY AND DEPRESSION: Primary | ICD-10-CM

## 2020-11-06 PROCEDURE — 99999 PR PBB SHADOW E&M-EST. PATIENT-LVL III: ICD-10-PCS | Mod: PBBFAC,,, | Performed by: NURSE PRACTITIONER

## 2020-11-06 PROCEDURE — 99213 PR OFFICE/OUTPT VISIT, EST, LEVL III, 20-29 MIN: ICD-10-PCS | Mod: S$GLB,,, | Performed by: NURSE PRACTITIONER

## 2020-11-06 PROCEDURE — 99213 OFFICE O/P EST LOW 20 MIN: CPT | Mod: S$GLB,,, | Performed by: NURSE PRACTITIONER

## 2020-11-06 PROCEDURE — 99999 PR PBB SHADOW E&M-EST. PATIENT-LVL III: CPT | Mod: PBBFAC,,, | Performed by: NURSE PRACTITIONER

## 2020-11-06 RX ORDER — DESVENLAFAXINE SUCCINATE 50 MG/1
50 TABLET, EXTENDED RELEASE ORAL DAILY
Qty: 30 TABLET | Refills: 11 | Status: SHIPPED | OUTPATIENT
Start: 2020-11-06 | End: 2020-11-06 | Stop reason: SDUPTHER

## 2020-11-06 RX ORDER — DESVENLAFAXINE SUCCINATE 50 MG/1
50 TABLET, EXTENDED RELEASE ORAL DAILY
Qty: 90 TABLET | Refills: 3 | Status: SHIPPED | OUTPATIENT
Start: 2020-11-06 | End: 2021-11-16 | Stop reason: SDUPTHER

## 2020-11-06 NOTE — PROGRESS NOTES
Subjective:       Patient ID: Danielle Alarcon is a 45 y.o. female.    Chief Complaint: Follow-up    Depression  Visit Type: follow-up  Patient is not experiencing: confusion, nervousness/anxiety, palpitations and shortness of breath.  Frequency of symptoms: rarely   Severity: mild   Sleep quality: fair  Nighttime awakenings: occasional    Started Pristiq 50 mg daily having great improvement. Would like to stay at the same dose. No side effects.    Review of Systems   Constitutional: Negative for activity change, fatigue, fever and unexpected weight change.   HENT: Negative for ear pain, hearing loss, rhinorrhea, sore throat and trouble swallowing.    Eyes: Negative for pain, discharge and visual disturbance.   Respiratory: Negative for cough, chest tightness, shortness of breath and wheezing.    Cardiovascular: Negative for chest pain and palpitations.   Gastrointestinal: Negative for abdominal pain, blood in stool, constipation, diarrhea and vomiting.   Endocrine: Negative for polydipsia and polyuria.   Genitourinary: Negative for difficulty urinating, dysuria, hematuria and menstrual problem.   Musculoskeletal: Negative for arthralgias, joint swelling, myalgias and neck pain.   Skin: Negative for color change and rash.   Neurological: Negative for dizziness, weakness and headaches.   Psychiatric/Behavioral: Positive for depression and dysphoric mood. Negative for confusion and sleep disturbance. The patient is not nervous/anxious.        Vitals:    11/06/20 1353   BP: 136/88   Pulse: 71   Temp: 98.2 °F (36.8 °C)       Objective:     Current Outpatient Medications   Medication Sig Dispense Refill    desvenlafaxine succinate (PRISTIQ) 50 MG Tb24 Take 1 tablet (50 mg total) by mouth once daily. 90 tablet 3    irbesartan (AVAPRO) 150 MG tablet Take 1 tablet (150 mg total) by mouth once daily. HOLD AM OF SURGERY 90 tablet 3    multivitamin (ONE DAILY MULTIVITAMIN) per tablet Take 1 tablet by mouth once daily.  HOLD AM OF SURGERY      pantoprazole (PROTONIX) 40 MG tablet Take 1 tablet (40 mg total) by mouth once daily. 90 tablet 3    TURMERIC ORAL Take 3 capsules by mouth once daily. Hold until after surgery       No current facility-administered medications for this visit.        Physical Exam  Vitals signs and nursing note reviewed.   Constitutional:       General: She is not in acute distress.     Appearance: She is well-developed.   HENT:      Head: Normocephalic and atraumatic.   Eyes:      Pupils: Pupils are equal, round, and reactive to light.   Neck:      Musculoskeletal: Normal range of motion and neck supple.   Cardiovascular:      Rate and Rhythm: Normal rate and regular rhythm.   Pulmonary:      Effort: Pulmonary effort is normal.      Breath sounds: Normal breath sounds.   Musculoskeletal: Normal range of motion.   Skin:     General: Skin is warm and dry.      Findings: No rash.   Neurological:      Mental Status: She is alert and oriented to person, place, and time.   Psychiatric:         Judgment: Judgment normal.         Assessment:       1. Anxiety and depression        Plan:   Anxiety and depression    Other orders  -     Discontinue: desvenlafaxine succinate (PRISTIQ) 50 MG Tb24; Take 1 tablet (50 mg total) by mouth once daily.  Dispense: 30 tablet; Refill: 11  -     desvenlafaxine succinate (PRISTIQ) 50 MG Tb24; Take 1 tablet (50 mg total) by mouth once daily.  Dispense: 90 tablet; Refill: 3        No follow-ups on file.    There are no Patient Instructions on file for this visit.

## 2020-11-23 ENCOUNTER — OFFICE VISIT (OUTPATIENT)
Dept: FAMILY MEDICINE | Facility: CLINIC | Age: 45
End: 2020-11-23
Payer: COMMERCIAL

## 2020-11-23 VITALS
WEIGHT: 236.38 LBS | SYSTOLIC BLOOD PRESSURE: 143 MMHG | TEMPERATURE: 99 F | HEART RATE: 71 BPM | HEIGHT: 66 IN | BODY MASS INDEX: 37.99 KG/M2 | DIASTOLIC BLOOD PRESSURE: 87 MMHG

## 2020-11-23 DIAGNOSIS — R21 RASH: Primary | ICD-10-CM

## 2020-11-23 PROCEDURE — 99999 PR PBB SHADOW E&M-EST. PATIENT-LVL IV: ICD-10-PCS | Mod: PBBFAC,,, | Performed by: NURSE PRACTITIONER

## 2020-11-23 PROCEDURE — 99213 PR OFFICE/OUTPT VISIT, EST, LEVL III, 20-29 MIN: ICD-10-PCS | Mod: 25,S$GLB,, | Performed by: NURSE PRACTITIONER

## 2020-11-23 PROCEDURE — 99999 PR PBB SHADOW E&M-EST. PATIENT-LVL IV: CPT | Mod: PBBFAC,,, | Performed by: NURSE PRACTITIONER

## 2020-11-23 PROCEDURE — 99213 OFFICE O/P EST LOW 20 MIN: CPT | Mod: 25,S$GLB,, | Performed by: NURSE PRACTITIONER

## 2020-11-23 PROCEDURE — 96372 PR INJECTION,THERAP/PROPH/DIAG2ST, IM OR SUBCUT: ICD-10-PCS | Mod: S$GLB,,, | Performed by: NURSE PRACTITIONER

## 2020-11-23 PROCEDURE — 96372 THER/PROPH/DIAG INJ SC/IM: CPT | Mod: S$GLB,,, | Performed by: NURSE PRACTITIONER

## 2020-11-23 RX ORDER — DEXAMETHASONE SODIUM PHOSPHATE 4 MG/ML
8 INJECTION, SOLUTION INTRA-ARTICULAR; INTRALESIONAL; INTRAMUSCULAR; INTRAVENOUS; SOFT TISSUE
Status: COMPLETED | OUTPATIENT
Start: 2020-11-23 | End: 2020-11-23

## 2020-11-23 RX ORDER — TRIAMCINOLONE ACETONIDE 5 MG/G
CREAM TOPICAL 2 TIMES DAILY
Qty: 45 G | Refills: 1 | Status: SHIPPED | OUTPATIENT
Start: 2020-11-23 | End: 2020-11-30

## 2020-11-23 RX ORDER — HYDROXYZINE PAMOATE 25 MG/1
25 CAPSULE ORAL EVERY 8 HOURS PRN
Qty: 21 CAPSULE | Refills: 0 | Status: SHIPPED | OUTPATIENT
Start: 2020-11-23 | End: 2020-11-30

## 2020-11-23 RX ADMIN — DEXAMETHASONE SODIUM PHOSPHATE 8 MG: 4 INJECTION, SOLUTION INTRA-ARTICULAR; INTRALESIONAL; INTRAMUSCULAR; INTRAVENOUS; SOFT TISSUE at 07:11

## 2020-11-23 NOTE — PROGRESS NOTES
Subjective:       Patient ID: Danielle Alarcon is a 45 y.o. female.    Chief Complaint: Rash    Rash  This is a new problem. The current episode started in the past 7 days. The problem has been gradually worsening since onset. The rash is diffuse. The rash is characterized by itchiness and redness. She was exposed to plant contact (States began after cleaning in yard). Pertinent negatives include no anorexia, congestion, cough, diarrhea, eye pain, facial edema, fatigue, fever, joint pain, nail changes, rhinorrhea, shortness of breath, sore throat or vomiting. Past treatments include nothing. The treatment provided no relief. There is no history of allergies, asthma, eczema or varicella.     Past Medical History:   Diagnosis Date    Anesthesia complication     delayed urination    Asthma     allergy induced    Bronchial pneumonia         Depression     GERD (gastroesophageal reflux disease)     Gestational diabetes     History of bronchitis     Hypertension      Social History     Socioeconomic History    Marital status:      Spouse name: Not on file    Number of children: Not on file    Years of education: Not on file    Highest education level: Not on file   Occupational History    Not on file   Social Needs    Financial resource strain: Not very hard    Food insecurity     Worry: Never true     Inability: Never true    Transportation needs     Medical: No     Non-medical: No   Tobacco Use    Smoking status: Former Smoker     Quit date:      Years since quittin.9    Smokeless tobacco: Never Used   Substance and Sexual Activity    Alcohol use: No     Frequency: 2-4 times a month     Drinks per session: 3 or 4     Binge frequency: Less than monthly    Drug use: No    Sexual activity: Not on file   Lifestyle    Physical activity     Days per week: 3 days     Minutes per session: 30 min    Stress: Very much   Relationships    Social connections     Talks on phone: More  than three times a week     Gets together: Patient refused     Attends [employer] service: Not on file     Active member of club or organization: Patient refused     Attends meetings of clubs or organizations: Patient refused     Relationship status:    Other Topics Concern    Not on file   Social History Narrative    Not on file     Past Surgical History:   Procedure Laterality Date    CHOLECYSTECTOMY      TONSILLECTOMY      TOTAL ABDOMINAL HYSTERECTOMY N/A 1/24/2020    Procedure: HYSTERECTOMY, TOTAL, ABDOMINAL;  Surgeon: Sonia Bird MD;  Location: [facility];  Service: OB/GYN;  Laterality: N/A;       Review of Systems   Constitutional: Negative.  Negative for fatigue and fever.   HENT: Negative.  Negative for nasal congestion, rhinorrhea and sore throat.    Eyes: Negative.  Negative for pain.   Respiratory: Negative.  Negative for cough and shortness of breath.    Cardiovascular: Negative.    Gastrointestinal: Negative.  Negative for anorexia, diarrhea and vomiting.   Endocrine: Negative.    Genitourinary: Negative.    Musculoskeletal: Negative.  Negative for joint pain.   Integumentary:  Positive for rash. Negative for nail changes.   Allergic/Immunologic: Negative.    Neurological: Negative.    Psychiatric/Behavioral: Negative.          Objective:      Physical Exam  Vitals signs and nursing note reviewed.   Constitutional:       Appearance: Normal appearance.   HENT:      Head: Normocephalic.      Right Ear: Tympanic membrane, ear canal and external ear normal.      Left Ear: Tympanic membrane, ear canal and external ear normal.      Nose: Nose normal.      Mouth/Throat:      Mouth: Mucous membranes are moist.      Pharynx: Oropharynx is clear.   Eyes:      Conjunctiva/sclera: Conjunctivae normal.      Pupils: Pupils are equal, round, and reactive to light.   Neck:      Musculoskeletal: Normal range of motion and neck supple.   Cardiovascular:      Rate and Rhythm: Normal rate and regular  rhythm.      Pulses: Normal pulses.      Heart sounds: Normal heart sounds.   Pulmonary:      Effort: Pulmonary effort is normal.      Breath sounds: Normal breath sounds.   Abdominal:      General: Bowel sounds are normal.      Palpations: Abdomen is soft.   Musculoskeletal: Normal range of motion.   Skin:     General: Skin is warm and dry.      Capillary Refill: Capillary refill takes 2 to 3 seconds.      Findings: Rash present. No abrasion, abscess, acne, bruising, burn, ecchymosis, erythema, signs of injury, laceration, lesion, petechiae or wound. Rash is urticarial.   Neurological:      Mental Status: She is alert and oriented to person, place, and time.   Psychiatric:         Mood and Affect: Mood normal.         Behavior: Behavior normal.         Thought Content: Thought content normal.         Judgment: Judgment normal.         Assessment:       1. Rash        Plan:           Danielle was seen today for rash.    Diagnoses and all orders for this visit:    Rash  -     dexamethasone injection 8 mg  -     hydrOXYzine pamoate (VISTARIL) 25 MG Cap; Take 1 capsule (25 mg total) by mouth every 8 (eight) hours as needed.  -     triamcinolone acetonide 0.5% (KENALOG) 0.5 % Crea; Apply topically 2 (two) times daily. for 7 days  Aveeno oatmeal baths OTC as directed  RTC as needed  Report to ER immediately if symptoms worsen

## 2021-04-27 ENCOUNTER — PATIENT MESSAGE (OUTPATIENT)
Dept: FAMILY MEDICINE | Facility: CLINIC | Age: 46
End: 2021-04-27

## 2021-04-27 DIAGNOSIS — F41.9 ANXIETY AND DEPRESSION: Primary | ICD-10-CM

## 2021-04-27 DIAGNOSIS — F32.A ANXIETY AND DEPRESSION: Primary | ICD-10-CM

## 2021-05-11 ENCOUNTER — OFFICE VISIT (OUTPATIENT)
Dept: PSYCHIATRY | Facility: CLINIC | Age: 46
End: 2021-05-11
Payer: COMMERCIAL

## 2021-05-11 DIAGNOSIS — F41.9 ANXIETY DISORDER, UNSPECIFIED TYPE: Primary | ICD-10-CM

## 2021-05-11 DIAGNOSIS — F33.2 SEVERE EPISODE OF RECURRENT MAJOR DEPRESSIVE DISORDER, WITHOUT PSYCHOTIC FEATURES: ICD-10-CM

## 2021-05-11 PROCEDURE — 90791 PR PSYCHIATRIC DIAGNOSTIC EVALUATION: ICD-10-PCS | Mod: S$GLB,,, | Performed by: PSYCHOLOGIST

## 2021-05-11 PROCEDURE — 90791 PSYCH DIAGNOSTIC EVALUATION: CPT | Mod: S$GLB,,, | Performed by: PSYCHOLOGIST

## 2021-05-11 PROCEDURE — 99999 PR PBB SHADOW E&M-EST. PATIENT-LVL I: ICD-10-PCS | Mod: PBBFAC,,, | Performed by: PSYCHOLOGIST

## 2021-05-11 PROCEDURE — 99999 PR PBB SHADOW E&M-EST. PATIENT-LVL I: CPT | Mod: PBBFAC,,, | Performed by: PSYCHOLOGIST

## 2021-05-17 ENCOUNTER — PATIENT MESSAGE (OUTPATIENT)
Dept: FAMILY MEDICINE | Facility: CLINIC | Age: 46
End: 2021-05-17

## 2021-05-17 ENCOUNTER — PATIENT MESSAGE (OUTPATIENT)
Dept: PSYCHIATRY | Facility: CLINIC | Age: 46
End: 2021-05-17

## 2021-05-17 DIAGNOSIS — R73.9 HYPERGLYCEMIA: ICD-10-CM

## 2021-05-17 DIAGNOSIS — I10 ESSENTIAL HYPERTENSION: Primary | ICD-10-CM

## 2021-05-17 DIAGNOSIS — Z11.59 NEED FOR HEPATITIS C SCREENING TEST: ICD-10-CM

## 2021-05-21 ENCOUNTER — TELEPHONE (OUTPATIENT)
Dept: PSYCHIATRY | Facility: CLINIC | Age: 46
End: 2021-05-21

## 2021-05-22 ENCOUNTER — PATIENT MESSAGE (OUTPATIENT)
Dept: FAMILY MEDICINE | Facility: CLINIC | Age: 46
End: 2021-05-22

## 2021-05-26 ENCOUNTER — PATIENT MESSAGE (OUTPATIENT)
Dept: FAMILY MEDICINE | Facility: CLINIC | Age: 46
End: 2021-05-26

## 2021-05-26 ENCOUNTER — OFFICE VISIT (OUTPATIENT)
Dept: FAMILY MEDICINE | Facility: CLINIC | Age: 46
End: 2021-05-26
Payer: COMMERCIAL

## 2021-05-26 VITALS
BODY MASS INDEX: 37.91 KG/M2 | SYSTOLIC BLOOD PRESSURE: 136 MMHG | TEMPERATURE: 97 F | WEIGHT: 235.88 LBS | HEIGHT: 66 IN | DIASTOLIC BLOOD PRESSURE: 86 MMHG | HEART RATE: 74 BPM

## 2021-05-26 DIAGNOSIS — I10 ESSENTIAL HYPERTENSION: ICD-10-CM

## 2021-05-26 DIAGNOSIS — Z01.818 PRE-OP TESTING: ICD-10-CM

## 2021-05-26 DIAGNOSIS — Z00.00 ANNUAL PHYSICAL EXAM: Primary | ICD-10-CM

## 2021-05-26 DIAGNOSIS — R73.9 HYPERGLYCEMIA: ICD-10-CM

## 2021-05-26 DIAGNOSIS — K21.9 GASTROESOPHAGEAL REFLUX DISEASE WITHOUT ESOPHAGITIS: ICD-10-CM

## 2021-05-26 DIAGNOSIS — D50.0 IRON DEFICIENCY ANEMIA DUE TO CHRONIC BLOOD LOSS: ICD-10-CM

## 2021-05-26 DIAGNOSIS — Z12.31 ENCOUNTER FOR SCREENING MAMMOGRAM FOR BREAST CANCER: ICD-10-CM

## 2021-05-26 LAB
ALBUMIN SERPL-MCNC: 3.8 G/DL (ref 3.6–5.1)
ALBUMIN/GLOB SERPL: 1.6 (CALC) (ref 1–2.5)
ALP SERPL-CCNC: 89 U/L (ref 31–125)
ALT SERPL-CCNC: 12 U/L (ref 6–29)
AST SERPL-CCNC: 14 U/L (ref 10–35)
BASOPHILS # BLD AUTO: 32 CELLS/UL (ref 0–200)
BASOPHILS NFR BLD AUTO: 0.5 %
BILIRUB SERPL-MCNC: 0.4 MG/DL (ref 0.2–1.2)
BUN SERPL-MCNC: 17 MG/DL (ref 7–25)
BUN/CREAT SERPL: NORMAL (CALC) (ref 6–22)
CALCIUM SERPL-MCNC: 8.8 MG/DL (ref 8.6–10.2)
CHLORIDE SERPL-SCNC: 105 MMOL/L (ref 98–110)
CHOLEST SERPL-MCNC: 169 MG/DL
CHOLEST/HDLC SERPL: 2.8 (CALC)
CO2 SERPL-SCNC: 30 MMOL/L (ref 20–32)
CREAT SERPL-MCNC: 0.97 MG/DL (ref 0.5–1.1)
EOSINOPHIL # BLD AUTO: 13 CELLS/UL (ref 15–500)
EOSINOPHIL NFR BLD AUTO: 0.2 %
ERYTHROCYTE [DISTWIDTH] IN BLOOD BY AUTOMATED COUNT: 15.5 % (ref 11–15)
GLOBULIN SER CALC-MCNC: 2.4 G/DL (CALC) (ref 1.9–3.7)
GLUCOSE SERPL-MCNC: 91 MG/DL (ref 65–99)
HBA1C MFR BLD: 5.1 % OF TOTAL HGB
HCT VFR BLD AUTO: 34.3 % (ref 35–45)
HCV AB S/CO SERPL IA: 0.01
HCV AB SERPL QL IA: NORMAL
HDLC SERPL-MCNC: 61 MG/DL
HGB BLD-MCNC: 10.9 G/DL (ref 11.7–15.5)
IRON SATN MFR SERPL: 14 % (CALC) (ref 16–45)
IRON SERPL-MCNC: 46 MCG/DL (ref 40–190)
LDLC SERPL CALC-MCNC: 94 MG/DL (CALC)
LYMPHOCYTES # BLD AUTO: 1914 CELLS/UL (ref 850–3900)
LYMPHOCYTES NFR BLD AUTO: 29.9 %
MCH RBC QN AUTO: 27.7 PG (ref 27–33)
MCHC RBC AUTO-ENTMCNC: 31.8 G/DL (ref 32–36)
MCV RBC AUTO: 87.1 FL (ref 80–100)
MONOCYTES # BLD AUTO: 307 CELLS/UL (ref 200–950)
MONOCYTES NFR BLD AUTO: 4.8 %
NEUTROPHILS # BLD AUTO: 4134 CELLS/UL (ref 1500–7800)
NEUTROPHILS NFR BLD AUTO: 64.6 %
NONHDLC SERPL-MCNC: 108 MG/DL (CALC)
PLATELET # BLD AUTO: 177 THOUSAND/UL (ref 140–400)
PMV BLD REES-ECKER: 11.8 FL (ref 7.5–12.5)
POTASSIUM SERPL-SCNC: 4.2 MMOL/L (ref 3.5–5.3)
PROT SERPL-MCNC: 6.2 G/DL (ref 6.1–8.1)
RBC # BLD AUTO: 3.94 MILLION/UL (ref 3.8–5.1)
SODIUM SERPL-SCNC: 140 MMOL/L (ref 135–146)
TIBC SERPL-MCNC: 320 MCG/DL (CALC) (ref 250–450)
TRIGL SERPL-MCNC: 49 MG/DL
WBC # BLD AUTO: 6.4 THOUSAND/UL (ref 3.8–10.8)

## 2021-05-26 PROCEDURE — 99999 PR PBB SHADOW E&M-EST. PATIENT-LVL III: ICD-10-PCS | Mod: PBBFAC,,, | Performed by: FAMILY MEDICINE

## 2021-05-26 PROCEDURE — 99999 PR PBB SHADOW E&M-EST. PATIENT-LVL III: CPT | Mod: PBBFAC,,, | Performed by: FAMILY MEDICINE

## 2021-05-26 PROCEDURE — 99396 PREV VISIT EST AGE 40-64: CPT | Mod: S$GLB,,, | Performed by: FAMILY MEDICINE

## 2021-05-26 PROCEDURE — 99396 PR PREVENTIVE VISIT,EST,40-64: ICD-10-PCS | Mod: S$GLB,,, | Performed by: FAMILY MEDICINE

## 2021-05-26 RX ORDER — IRBESARTAN 150 MG/1
150 TABLET ORAL DAILY
Qty: 90 TABLET | Refills: 3 | Status: SHIPPED | OUTPATIENT
Start: 2021-05-26 | End: 2022-05-16

## 2021-05-26 RX ORDER — FERROUS SULFATE 324(65)MG
324 TABLET, DELAYED RELEASE (ENTERIC COATED) ORAL DAILY
Qty: 90 TABLET | Refills: 3 | Status: SHIPPED | OUTPATIENT
Start: 2021-05-26 | End: 2022-05-03

## 2021-05-26 RX ORDER — SODIUM, POTASSIUM,MAG SULFATES 17.5-3.13G
SOLUTION, RECONSTITUTED, ORAL ORAL
Qty: 354 ML | Refills: 0 | Status: ON HOLD | OUTPATIENT
Start: 2021-05-26 | End: 2022-01-12 | Stop reason: ALTCHOICE

## 2021-05-26 RX ORDER — PANTOPRAZOLE SODIUM 40 MG/1
40 TABLET, DELAYED RELEASE ORAL DAILY
Qty: 90 TABLET | Refills: 3 | Status: SHIPPED | OUTPATIENT
Start: 2021-05-26 | End: 2022-05-16

## 2021-05-28 ENCOUNTER — TELEPHONE (OUTPATIENT)
Dept: ADMINISTRATIVE | Facility: HOSPITAL | Age: 46
End: 2021-05-28

## 2021-05-31 ENCOUNTER — HOSPITAL ENCOUNTER (OUTPATIENT)
Dept: RADIOLOGY | Facility: HOSPITAL | Age: 46
Discharge: HOME | End: 2021-05-31
Attending: FAMILY MEDICINE
Payer: COMMERCIAL

## 2021-05-31 VITALS — WEIGHT: 235.88 LBS | HEIGHT: 66 IN | BODY MASS INDEX: 37.91 KG/M2

## 2021-05-31 DIAGNOSIS — Z12.31 ENCOUNTER FOR SCREENING MAMMOGRAM FOR BREAST CANCER: ICD-10-CM

## 2021-05-31 PROCEDURE — 77063 BREAST TOMOSYNTHESIS BI: CPT | Mod: 26,,, | Performed by: RADIOLOGY

## 2021-05-31 PROCEDURE — 77067 SCR MAMMO BI INCL CAD: CPT | Mod: TC,PO

## 2021-05-31 PROCEDURE — 77067 MAMMO DIGITAL SCREENING BILAT WITH TOMO: ICD-10-PCS | Mod: 26,,, | Performed by: RADIOLOGY

## 2021-05-31 PROCEDURE — 77063 MAMMO DIGITAL SCREENING BILAT WITH TOMO: ICD-10-PCS | Mod: 26,,, | Performed by: RADIOLOGY

## 2021-05-31 PROCEDURE — 77067 SCR MAMMO BI INCL CAD: CPT | Mod: 26,,, | Performed by: RADIOLOGY

## 2021-07-12 ENCOUNTER — CLINICAL SUPPORT (OUTPATIENT)
Dept: FAMILY MEDICINE | Facility: CLINIC | Age: 46
End: 2021-07-12
Payer: COMMERCIAL

## 2021-07-12 DIAGNOSIS — Z01.818 PRE-OP TESTING: ICD-10-CM

## 2021-07-12 PROCEDURE — U0005 INFEC AGEN DETEC AMPLI PROBE: HCPCS | Performed by: FAMILY MEDICINE

## 2021-07-12 PROCEDURE — U0003 INFECTIOUS AGENT DETECTION BY NUCLEIC ACID (DNA OR RNA); SEVERE ACUTE RESPIRATORY SYNDROME CORONAVIRUS 2 (SARS-COV-2) (CORONAVIRUS DISEASE [COVID-19]), AMPLIFIED PROBE TECHNIQUE, MAKING USE OF HIGH THROUGHPUT TECHNOLOGIES AS DESCRIBED BY CMS-2020-01-R: HCPCS | Performed by: FAMILY MEDICINE

## 2021-07-13 LAB
SARS-COV-2 RNA RESP QL NAA+PROBE: NOT DETECTED
SARS-COV-2- CYCLE NUMBER: -1

## 2021-07-15 ENCOUNTER — ANESTHESIA (OUTPATIENT)
Dept: ENDOSCOPY | Facility: HOSPITAL | Age: 46
End: 2021-07-15
Payer: COMMERCIAL

## 2021-07-15 ENCOUNTER — HOSPITAL ENCOUNTER (OUTPATIENT)
Facility: HOSPITAL | Age: 46
Discharge: HOME | End: 2021-07-15
Attending: FAMILY MEDICINE | Admitting: FAMILY MEDICINE
Payer: COMMERCIAL

## 2021-07-15 ENCOUNTER — ANESTHESIA EVENT (OUTPATIENT)
Dept: ENDOSCOPY | Facility: HOSPITAL | Age: 46
End: 2021-07-15
Payer: COMMERCIAL

## 2021-07-15 DIAGNOSIS — D50.0 IRON DEFICIENCY ANEMIA DUE TO CHRONIC BLOOD LOSS: Primary | ICD-10-CM

## 2021-07-15 PROCEDURE — 27201089 HC SNARE, DISP (ANY): Performed by: INTERNAL MEDICINE

## 2021-07-15 PROCEDURE — 88305 TISSUE EXAM BY PATHOLOGIST: CPT | Performed by: PATHOLOGY

## 2021-07-15 PROCEDURE — 37000009 HC ANESTHESIA EA ADD 15 MINS: Performed by: INTERNAL MEDICINE

## 2021-07-15 PROCEDURE — 45378 DIAGNOSTIC COLONOSCOPY: CPT | Performed by: INTERNAL MEDICINE

## 2021-07-15 PROCEDURE — 43251 EGD REMOVE LESION SNARE: CPT | Mod: 51,,, | Performed by: INTERNAL MEDICINE

## 2021-07-15 PROCEDURE — 37000008 HC ANESTHESIA 1ST 15 MINUTES: Performed by: INTERNAL MEDICINE

## 2021-07-15 PROCEDURE — 43251 PR EGD, FLEX, W/REMOVAL, TUMOR/POLYP/LESION(S), SNARE: ICD-10-PCS | Mod: 51,,, | Performed by: INTERNAL MEDICINE

## 2021-07-15 PROCEDURE — 25000003 PHARM REV CODE 250: Performed by: NURSE ANESTHETIST, CERTIFIED REGISTERED

## 2021-07-15 PROCEDURE — 63600175 PHARM REV CODE 636 W HCPCS: Performed by: NURSE ANESTHETIST, CERTIFIED REGISTERED

## 2021-07-15 PROCEDURE — 88305 TISSUE EXAM BY PATHOLOGIST: CPT | Mod: 26,,, | Performed by: PATHOLOGY

## 2021-07-15 PROCEDURE — 43251 EGD REMOVE LESION SNARE: CPT | Performed by: INTERNAL MEDICINE

## 2021-07-15 PROCEDURE — 45378 PR COLONOSCOPY,DIAGNOSTIC: ICD-10-PCS | Mod: ,,, | Performed by: INTERNAL MEDICINE

## 2021-07-15 PROCEDURE — 45378 DIAGNOSTIC COLONOSCOPY: CPT | Mod: ,,, | Performed by: INTERNAL MEDICINE

## 2021-07-15 PROCEDURE — 88305 TISSUE EXAM BY PATHOLOGIST: ICD-10-PCS | Mod: 26,,, | Performed by: PATHOLOGY

## 2021-07-15 RX ORDER — PROPOFOL 10 MG/ML
VIAL (ML) INTRAVENOUS
Status: DISCONTINUED | OUTPATIENT
Start: 2021-07-15 | End: 2021-07-15

## 2021-07-15 RX ORDER — LIDOCAINE HYDROCHLORIDE 10 MG/ML
INJECTION, SOLUTION EPIDURAL; INFILTRATION; INTRACAUDAL; PERINEURAL
Status: DISCONTINUED | OUTPATIENT
Start: 2021-07-15 | End: 2021-07-15

## 2021-07-15 RX ADMIN — SODIUM CHLORIDE, SODIUM LACTATE, POTASSIUM CHLORIDE, AND CALCIUM CHLORIDE: .6; .31; .03; .02 INJECTION, SOLUTION INTRAVENOUS at 11:07

## 2021-07-15 RX ADMIN — PROPOFOL 50 MG: 10 INJECTION, EMULSION INTRAVENOUS at 11:07

## 2021-07-15 RX ADMIN — PROPOFOL 100 MG: 10 INJECTION, EMULSION INTRAVENOUS at 11:07

## 2021-07-15 RX ADMIN — LIDOCAINE HYDROCHLORIDE 50 MG: 10 INJECTION, SOLUTION EPIDURAL; INFILTRATION; INTRACAUDAL; PERINEURAL at 11:07

## 2021-07-16 VITALS
OXYGEN SATURATION: 99 % | SYSTOLIC BLOOD PRESSURE: 150 MMHG | RESPIRATION RATE: 18 BRPM | HEIGHT: 66 IN | BODY MASS INDEX: 36.07 KG/M2 | HEART RATE: 61 BPM | WEIGHT: 224.44 LBS | DIASTOLIC BLOOD PRESSURE: 91 MMHG | TEMPERATURE: 97 F

## 2021-07-22 LAB
FINAL PATHOLOGIC DIAGNOSIS: NORMAL
GROSS: NORMAL
Lab: NORMAL

## 2021-07-28 ENCOUNTER — PATIENT MESSAGE (OUTPATIENT)
Dept: FAMILY MEDICINE | Facility: CLINIC | Age: 46
End: 2021-07-28

## 2021-11-03 ENCOUNTER — LAB VISIT (OUTPATIENT)
Dept: LAB | Facility: HOSPITAL | Age: 46
End: 2021-11-03
Attending: FAMILY MEDICINE
Payer: COMMERCIAL

## 2021-11-03 ENCOUNTER — TELEPHONE (OUTPATIENT)
Dept: FAMILY MEDICINE | Facility: CLINIC | Age: 46
End: 2021-11-03
Payer: COMMERCIAL

## 2021-11-03 ENCOUNTER — PATIENT MESSAGE (OUTPATIENT)
Dept: FAMILY MEDICINE | Facility: CLINIC | Age: 46
End: 2021-11-03
Payer: COMMERCIAL

## 2021-11-03 DIAGNOSIS — R31.9 HEMATURIA, UNSPECIFIED TYPE: ICD-10-CM

## 2021-11-03 DIAGNOSIS — R31.9 HEMATURIA, UNSPECIFIED TYPE: Primary | ICD-10-CM

## 2021-11-03 LAB
BACTERIA #/AREA URNS HPF: ABNORMAL /HPF
BILIRUB UR QL STRIP: NEGATIVE
CLARITY UR: CLEAR
COLOR UR: YELLOW
GLUCOSE UR QL STRIP: NEGATIVE
HGB UR QL STRIP: ABNORMAL
KETONES UR QL STRIP: NEGATIVE
LEUKOCYTE ESTERASE UR QL STRIP: ABNORMAL
MICROSCOPIC COMMENT: ABNORMAL
NITRITE UR QL STRIP: NEGATIVE
PH UR STRIP: 7 [PH] (ref 5–8)
PROT UR QL STRIP: NEGATIVE
RBC #/AREA URNS HPF: 4 /HPF (ref 0–4)
SP GR UR STRIP: 1.01 (ref 1–1.03)
SQUAMOUS #/AREA URNS HPF: 4 /HPF
TRI-PHOS CRY URNS QL MICRO: ABNORMAL
URN SPEC COLLECT METH UR: ABNORMAL
WBC #/AREA URNS HPF: 5 /HPF (ref 0–5)

## 2021-11-03 PROCEDURE — 81000 URINALYSIS NONAUTO W/SCOPE: CPT | Mod: PO | Performed by: FAMILY MEDICINE

## 2021-11-04 ENCOUNTER — PATIENT MESSAGE (OUTPATIENT)
Dept: FAMILY MEDICINE | Facility: CLINIC | Age: 46
End: 2021-11-04
Payer: COMMERCIAL

## 2021-11-08 ENCOUNTER — PATIENT MESSAGE (OUTPATIENT)
Dept: FAMILY MEDICINE | Facility: CLINIC | Age: 46
End: 2021-11-08
Payer: COMMERCIAL

## 2021-11-16 DIAGNOSIS — F32.A ANXIETY AND DEPRESSION: Primary | ICD-10-CM

## 2021-11-16 DIAGNOSIS — F41.9 ANXIETY AND DEPRESSION: Primary | ICD-10-CM

## 2021-11-16 RX ORDER — DESVENLAFAXINE SUCCINATE 50 MG/1
50 TABLET, EXTENDED RELEASE ORAL DAILY
Qty: 90 TABLET | Refills: 3 | Status: SHIPPED | OUTPATIENT
Start: 2021-11-16 | End: 2022-05-30 | Stop reason: SDUPTHER

## 2021-11-29 ENCOUNTER — HOSPITAL ENCOUNTER (OUTPATIENT)
Dept: RADIOLOGY | Facility: HOSPITAL | Age: 46
Discharge: HOME | End: 2021-11-29
Attending: FAMILY MEDICINE
Payer: COMMERCIAL

## 2021-11-29 DIAGNOSIS — R31.9 HEMATURIA, UNSPECIFIED TYPE: ICD-10-CM

## 2021-11-29 PROCEDURE — 74176 CT ABD & PELVIS W/O CONTRAST: CPT | Mod: 26,,, | Performed by: RADIOLOGY

## 2021-11-29 PROCEDURE — 74176 CT ABD & PELVIS W/O CONTRAST: CPT | Mod: TC

## 2021-11-29 PROCEDURE — 74176 CT RENAL STONE STUDY ABD PELVIS WO: ICD-10-PCS | Mod: 26,,, | Performed by: RADIOLOGY

## 2021-12-04 DIAGNOSIS — R31.9 HEMATURIA, UNSPECIFIED TYPE: Primary | ICD-10-CM

## 2021-12-13 ENCOUNTER — PATIENT OUTREACH (OUTPATIENT)
Dept: ADMINISTRATIVE | Facility: OTHER | Age: 46
End: 2021-12-13
Payer: COMMERCIAL

## 2021-12-14 ENCOUNTER — HOSPITAL ENCOUNTER (OUTPATIENT)
Dept: RADIOLOGY | Facility: HOSPITAL | Age: 46
Discharge: HOME | End: 2021-12-14
Attending: UROLOGY
Payer: COMMERCIAL

## 2021-12-14 ENCOUNTER — OFFICE VISIT (OUTPATIENT)
Dept: UROLOGY | Facility: CLINIC | Age: 46
End: 2021-12-14
Payer: COMMERCIAL

## 2021-12-14 ENCOUNTER — PATIENT MESSAGE (OUTPATIENT)
Dept: UROLOGY | Facility: CLINIC | Age: 46
End: 2021-12-14

## 2021-12-14 VITALS
SYSTOLIC BLOOD PRESSURE: 145 MMHG | BODY MASS INDEX: 36.6 KG/M2 | HEART RATE: 63 BPM | WEIGHT: 227.75 LBS | HEIGHT: 66 IN | DIASTOLIC BLOOD PRESSURE: 96 MMHG

## 2021-12-14 DIAGNOSIS — N20.0 RENAL STONES: ICD-10-CM

## 2021-12-14 DIAGNOSIS — N20.0 RENAL STONES: Primary | ICD-10-CM

## 2021-12-14 DIAGNOSIS — R31.0 GROSS HEMATURIA: ICD-10-CM

## 2021-12-14 DIAGNOSIS — R39.15 URGENCY OF MICTURITION: ICD-10-CM

## 2021-12-14 LAB
BACTERIA #/AREA URNS HPF: ABNORMAL /HPF
BILIRUB UR QL STRIP: NEGATIVE
CLARITY UR: CLEAR
COLOR UR: YELLOW
GLUCOSE UR QL STRIP: NEGATIVE
HGB UR QL STRIP: NEGATIVE
KETONES UR QL STRIP: NEGATIVE
LEUKOCYTE ESTERASE UR QL STRIP: ABNORMAL
MICROSCOPIC COMMENT: ABNORMAL
NITRITE UR QL STRIP: POSITIVE
PH UR STRIP: 7 [PH] (ref 5–8)
PROT UR QL STRIP: NEGATIVE
RBC #/AREA URNS HPF: 1 /HPF (ref 0–4)
SP GR UR STRIP: 1.01 (ref 1–1.03)
URN SPEC COLLECT METH UR: ABNORMAL
UROBILINOGEN UR STRIP-ACNC: NEGATIVE EU/DL
WBC #/AREA URNS HPF: 5 /HPF (ref 0–5)

## 2021-12-14 PROCEDURE — 87088 URINE BACTERIA CULTURE: CPT | Performed by: UROLOGY

## 2021-12-14 PROCEDURE — 88112 CYTOPATH CELL ENHANCE TECH: CPT | Mod: 26,,, | Performed by: PATHOLOGY

## 2021-12-14 PROCEDURE — 88112 PR  CYTOPATH, CELL ENHANCE TECH: ICD-10-PCS | Mod: 26,,, | Performed by: PATHOLOGY

## 2021-12-14 PROCEDURE — 87086 URINE CULTURE/COLONY COUNT: CPT | Performed by: UROLOGY

## 2021-12-14 PROCEDURE — 99204 OFFICE O/P NEW MOD 45 MIN: CPT | Mod: ,,, | Performed by: UROLOGY

## 2021-12-14 PROCEDURE — 87186 SC STD MICRODIL/AGAR DIL: CPT | Performed by: UROLOGY

## 2021-12-14 PROCEDURE — 99999 PR PBB SHADOW E&M-EST. PATIENT-LVL V: ICD-10-PCS | Mod: PBBFAC,,, | Performed by: UROLOGY

## 2021-12-14 PROCEDURE — 74018 RADEX ABDOMEN 1 VIEW: CPT | Mod: 26,,, | Performed by: RADIOLOGY

## 2021-12-14 PROCEDURE — 74018 RADEX ABDOMEN 1 VIEW: CPT | Mod: TC,FY,PO

## 2021-12-14 PROCEDURE — 88112 CYTOPATH CELL ENHANCE TECH: CPT | Performed by: PATHOLOGY

## 2021-12-14 PROCEDURE — 74018 XR ABDOMEN AP 1 VIEW: ICD-10-PCS | Mod: 26,,, | Performed by: RADIOLOGY

## 2021-12-14 PROCEDURE — 87077 CULTURE AEROBIC IDENTIFY: CPT | Performed by: UROLOGY

## 2021-12-14 PROCEDURE — 81000 URINALYSIS NONAUTO W/SCOPE: CPT | Performed by: UROLOGY

## 2021-12-14 PROCEDURE — 99204 PR OFFICE/OUTPT VISIT, NEW, LEVL IV, 45-59 MIN: ICD-10-PCS | Mod: ,,, | Performed by: UROLOGY

## 2021-12-14 PROCEDURE — 99999 PR PBB SHADOW E&M-EST. PATIENT-LVL V: CPT | Mod: PBBFAC,,, | Performed by: UROLOGY

## 2021-12-14 RX ORDER — CIPROFLOXACIN 2 MG/ML
400 INJECTION, SOLUTION INTRAVENOUS
Status: CANCELLED | OUTPATIENT
Start: 2021-12-14

## 2021-12-15 LAB
FINAL PATHOLOGIC DIAGNOSIS: NORMAL
Lab: NORMAL

## 2021-12-16 LAB — BACTERIA UR CULT: ABNORMAL

## 2021-12-17 ENCOUNTER — PATIENT MESSAGE (OUTPATIENT)
Dept: SURGERY | Facility: HOSPITAL | Age: 46
End: 2021-12-17
Payer: COMMERCIAL

## 2021-12-21 ENCOUNTER — TELEPHONE (OUTPATIENT)
Dept: UROLOGY | Facility: CLINIC | Age: 46
End: 2021-12-21
Payer: COMMERCIAL

## 2021-12-21 RX ORDER — CIPROFLOXACIN 500 MG/1
500 TABLET ORAL 2 TIMES DAILY
Qty: 14 TABLET | Refills: 0 | Status: SHIPPED | OUTPATIENT
Start: 2021-12-21 | End: 2021-12-28

## 2021-12-21 NOTE — PRE-PROCEDURE INSTRUCTIONS
PHONE PRE-OP WAS DONE. ICOUGH INSTRUCTIONS GIVEN.  PATIENT VERBALIZED UNDERSTANDING OF ALL INSTRUCTIONS AND EDUCATION.

## 2021-12-22 ENCOUNTER — HOSPITAL ENCOUNTER (OUTPATIENT)
Dept: PREADMISSION TESTING | Facility: HOSPITAL | Age: 46
Discharge: HOME | End: 2021-12-22
Payer: COMMERCIAL

## 2021-12-28 ENCOUNTER — ANESTHESIA EVENT (OUTPATIENT)
Dept: SURGERY | Facility: HOSPITAL | Age: 46
End: 2021-12-28
Payer: COMMERCIAL

## 2021-12-29 ENCOUNTER — TELEPHONE (OUTPATIENT)
Dept: UROLOGY | Facility: CLINIC | Age: 46
End: 2021-12-29
Payer: COMMERCIAL

## 2021-12-29 ENCOUNTER — HOSPITAL ENCOUNTER (OUTPATIENT)
Facility: HOSPITAL | Age: 46
Discharge: HOME | End: 2021-12-29
Attending: UROLOGY | Admitting: UROLOGY
Payer: COMMERCIAL

## 2021-12-29 ENCOUNTER — ANESTHESIA (OUTPATIENT)
Dept: SURGERY | Facility: HOSPITAL | Age: 46
End: 2021-12-29
Payer: COMMERCIAL

## 2021-12-29 DIAGNOSIS — N20.0 RENAL STONES: ICD-10-CM

## 2021-12-29 LAB
ALBUMIN SERPL BCP-MCNC: 4.1 G/DL (ref 3.5–5.2)
ALP SERPL-CCNC: 101 U/L (ref 55–135)
ALT SERPL W/O P-5'-P-CCNC: 28 U/L (ref 10–44)
ANION GAP SERPL CALC-SCNC: 13 MMOL/L (ref 8–16)
AST SERPL-CCNC: 24 U/L (ref 10–40)
BASOPHILS # BLD AUTO: 0.04 K/UL (ref 0–0.2)
BASOPHILS NFR BLD: 0.5 % (ref 0–1.9)
BILIRUB SERPL-MCNC: 0.5 MG/DL (ref 0.1–1)
BUN SERPL-MCNC: 18 MG/DL (ref 6–20)
CALCIUM SERPL-MCNC: 9.3 MG/DL (ref 8.7–10.5)
CHLORIDE SERPL-SCNC: 106 MMOL/L (ref 95–110)
CO2 SERPL-SCNC: 22 MMOL/L (ref 23–29)
CREAT SERPL-MCNC: 1 MG/DL (ref 0.5–1.4)
DIFFERENTIAL METHOD: ABNORMAL
EOSINOPHIL # BLD AUTO: 0.2 K/UL (ref 0–0.5)
EOSINOPHIL NFR BLD: 2.7 % (ref 0–8)
ERYTHROCYTE [DISTWIDTH] IN BLOOD BY AUTOMATED COUNT: 14.5 % (ref 11.5–14.5)
EST. GFR  (AFRICAN AMERICAN): >60 ML/MIN/1.73 M^2
EST. GFR  (NON AFRICAN AMERICAN): >60 ML/MIN/1.73 M^2
GLUCOSE SERPL-MCNC: 102 MG/DL (ref 70–110)
HCT VFR BLD AUTO: 41.7 % (ref 37–48.5)
HGB BLD-MCNC: 13.6 G/DL (ref 12–16)
IMM GRANULOCYTES # BLD AUTO: 0.08 K/UL (ref 0–0.04)
IMM GRANULOCYTES NFR BLD AUTO: 1 % (ref 0–0.5)
LYMPHOCYTES # BLD AUTO: 2 K/UL (ref 1–4.8)
LYMPHOCYTES NFR BLD: 24.7 % (ref 18–48)
MCH RBC QN AUTO: 30 PG (ref 27–31)
MCHC RBC AUTO-ENTMCNC: 32.6 G/DL (ref 32–36)
MCV RBC AUTO: 92 FL (ref 82–98)
MONOCYTES # BLD AUTO: 0.4 K/UL (ref 0.3–1)
MONOCYTES NFR BLD: 4.9 % (ref 4–15)
NEUTROPHILS # BLD AUTO: 5.2 K/UL (ref 1.8–7.7)
NEUTROPHILS NFR BLD: 66.2 % (ref 38–73)
NRBC BLD-RTO: 0 /100 WBC
PLATELET # BLD AUTO: 157 K/UL (ref 150–450)
PMV BLD AUTO: 11 FL (ref 9.2–12.9)
POTASSIUM SERPL-SCNC: 4.4 MMOL/L (ref 3.5–5.1)
PROT SERPL-MCNC: 7.8 G/DL (ref 6–8.4)
RBC # BLD AUTO: 4.54 M/UL (ref 4–5.4)
SODIUM SERPL-SCNC: 141 MMOL/L (ref 136–145)
WBC # BLD AUTO: 7.89 K/UL (ref 3.9–12.7)

## 2021-12-29 PROCEDURE — 99900104 DSU ONLY-NO CHARGE-EA ADD'L HR (STAT): Performed by: UROLOGY

## 2021-12-29 PROCEDURE — 27200651 HC AIRWAY, LMA: Performed by: ANESTHESIOLOGY

## 2021-12-29 PROCEDURE — 25000003 PHARM REV CODE 250: Performed by: UROLOGY

## 2021-12-29 PROCEDURE — 50590 PR FRAGMENT KIDNEY STONE/ ESWL: ICD-10-PCS | Mod: RT,,, | Performed by: UROLOGY

## 2021-12-29 PROCEDURE — 52332 CYSTOSCOPY AND TREATMENT: CPT | Mod: 51,RT,, | Performed by: UROLOGY

## 2021-12-29 PROCEDURE — 25000003 PHARM REV CODE 250: Performed by: ANESTHESIOLOGY

## 2021-12-29 PROCEDURE — 37000009 HC ANESTHESIA EA ADD 15 MINS: Performed by: UROLOGY

## 2021-12-29 PROCEDURE — 93005 ELECTROCARDIOGRAM TRACING: CPT

## 2021-12-29 PROCEDURE — 36000706: Performed by: UROLOGY

## 2021-12-29 PROCEDURE — 25000003 PHARM REV CODE 250: Performed by: NURSE ANESTHETIST, CERTIFIED REGISTERED

## 2021-12-29 PROCEDURE — 71000033 HC RECOVERY, INTIAL HOUR: Performed by: UROLOGY

## 2021-12-29 PROCEDURE — 36000707: Performed by: UROLOGY

## 2021-12-29 PROCEDURE — C2617 STENT, NON-COR, TEM W/O DEL: HCPCS | Performed by: UROLOGY

## 2021-12-29 PROCEDURE — 71000015 HC POSTOP RECOV 1ST HR: Performed by: UROLOGY

## 2021-12-29 PROCEDURE — 52332 PR CYSTOSCOPY,INSERT URETERAL STENT: ICD-10-PCS | Mod: 51,RT,, | Performed by: UROLOGY

## 2021-12-29 PROCEDURE — D9220A PRA ANESTHESIA: Mod: CRNA,,, | Performed by: NURSE ANESTHETIST, CERTIFIED REGISTERED

## 2021-12-29 PROCEDURE — 37000008 HC ANESTHESIA 1ST 15 MINUTES: Performed by: UROLOGY

## 2021-12-29 PROCEDURE — 50590 FRAGMENTING OF KIDNEY STONE: CPT | Mod: RT,,, | Performed by: UROLOGY

## 2021-12-29 PROCEDURE — 71000016 HC POSTOP RECOV ADDL HR: Performed by: UROLOGY

## 2021-12-29 PROCEDURE — D9220A PRA ANESTHESIA: ICD-10-PCS | Mod: CRNA,,, | Performed by: NURSE ANESTHETIST, CERTIFIED REGISTERED

## 2021-12-29 PROCEDURE — D9220A PRA ANESTHESIA: Mod: ANES,,, | Performed by: ANESTHESIOLOGY

## 2021-12-29 PROCEDURE — 74420 UROGRAPHY RTRGR +-KUB: CPT | Mod: 26,,, | Performed by: UROLOGY

## 2021-12-29 PROCEDURE — 74420 PR  X-RAY RETROGRADE PYELOGRAM: ICD-10-PCS | Mod: 26,,, | Performed by: UROLOGY

## 2021-12-29 PROCEDURE — 36415 COLL VENOUS BLD VENIPUNCTURE: CPT | Performed by: ANESTHESIOLOGY

## 2021-12-29 PROCEDURE — 71000039 HC RECOVERY, EACH ADD'L HOUR: Performed by: UROLOGY

## 2021-12-29 PROCEDURE — 63600175 PHARM REV CODE 636 W HCPCS: Performed by: UROLOGY

## 2021-12-29 PROCEDURE — 85025 COMPLETE CBC W/AUTO DIFF WBC: CPT | Performed by: ANESTHESIOLOGY

## 2021-12-29 PROCEDURE — 80053 COMPREHEN METABOLIC PANEL: CPT | Performed by: ANESTHESIOLOGY

## 2021-12-29 PROCEDURE — 63600175 PHARM REV CODE 636 W HCPCS: Performed by: NURSE ANESTHETIST, CERTIFIED REGISTERED

## 2021-12-29 PROCEDURE — 63600175 PHARM REV CODE 636 W HCPCS: Performed by: ANESTHESIOLOGY

## 2021-12-29 PROCEDURE — 25500020 PHARM REV CODE 255: Performed by: UROLOGY

## 2021-12-29 PROCEDURE — 99900103 DSU ONLY-NO CHARGE-INITIAL HR (STAT): Performed by: UROLOGY

## 2021-12-29 PROCEDURE — C1758 CATHETER, URETERAL: HCPCS | Performed by: UROLOGY

## 2021-12-29 PROCEDURE — D9220A PRA ANESTHESIA: ICD-10-PCS | Mod: ANES,,, | Performed by: ANESTHESIOLOGY

## 2021-12-29 DEVICE — STENT SET URETERAL 6X24CM: Type: IMPLANTABLE DEVICE | Site: URETER | Status: FUNCTIONAL

## 2021-12-29 RX ORDER — ONDANSETRON 2 MG/ML
INJECTION INTRAMUSCULAR; INTRAVENOUS
Status: DISCONTINUED | OUTPATIENT
Start: 2021-12-29 | End: 2021-12-29

## 2021-12-29 RX ORDER — PHENAZOPYRIDINE HYDROCHLORIDE 200 MG/1
200 TABLET, FILM COATED ORAL ONCE
Status: COMPLETED | OUTPATIENT
Start: 2021-12-29 | End: 2021-12-29

## 2021-12-29 RX ORDER — DEXAMETHASONE SODIUM PHOSPHATE 4 MG/ML
INJECTION, SOLUTION INTRA-ARTICULAR; INTRALESIONAL; INTRAMUSCULAR; INTRAVENOUS; SOFT TISSUE
Status: DISCONTINUED | OUTPATIENT
Start: 2021-12-29 | End: 2021-12-29

## 2021-12-29 RX ORDER — KETOROLAC TROMETHAMINE 10 MG/1
10 TABLET, FILM COATED ORAL EVERY 8 HOURS PRN
Qty: 10 TABLET | Refills: 0 | Status: SHIPPED | OUTPATIENT
Start: 2021-12-29 | End: 2022-01-03

## 2021-12-29 RX ORDER — PHENAZOPYRIDINE HYDROCHLORIDE 200 MG/1
200 TABLET, FILM COATED ORAL 3 TIMES DAILY PRN
Qty: 30 TABLET | Refills: 1 | Status: SHIPPED | OUTPATIENT
Start: 2021-12-29 | End: 2022-01-08

## 2021-12-29 RX ORDER — OXYCODONE HYDROCHLORIDE 5 MG/1
5 TABLET ORAL ONCE AS NEEDED
Status: DISCONTINUED | OUTPATIENT
Start: 2021-12-29 | End: 2021-12-29 | Stop reason: HOSPADM

## 2021-12-29 RX ORDER — ONDANSETRON 2 MG/ML
4 INJECTION INTRAMUSCULAR; INTRAVENOUS ONCE AS NEEDED
Status: COMPLETED | OUTPATIENT
Start: 2021-12-29 | End: 2021-12-29

## 2021-12-29 RX ORDER — TRAMADOL HYDROCHLORIDE 50 MG/1
50 TABLET ORAL EVERY 6 HOURS
Qty: 15 TABLET | Refills: 0 | Status: ON HOLD | OUTPATIENT
Start: 2021-12-29 | End: 2022-01-12 | Stop reason: SDUPTHER

## 2021-12-29 RX ORDER — PROPOFOL 10 MG/ML
VIAL (ML) INTRAVENOUS
Status: DISCONTINUED | OUTPATIENT
Start: 2021-12-29 | End: 2021-12-29

## 2021-12-29 RX ORDER — FENTANYL CITRATE 50 UG/ML
INJECTION, SOLUTION INTRAMUSCULAR; INTRAVENOUS
Status: DISCONTINUED | OUTPATIENT
Start: 2021-12-29 | End: 2021-12-29

## 2021-12-29 RX ORDER — MIDAZOLAM HYDROCHLORIDE 1 MG/ML
INJECTION INTRAMUSCULAR; INTRAVENOUS
Status: DISCONTINUED | OUTPATIENT
Start: 2021-12-29 | End: 2021-12-29

## 2021-12-29 RX ORDER — ONDANSETRON 4 MG/1
8 TABLET, ORALLY DISINTEGRATING ORAL 2 TIMES DAILY
Qty: 15 TABLET | Refills: 0 | Status: SHIPPED | OUTPATIENT
Start: 2021-12-29 | End: 2022-05-26

## 2021-12-29 RX ORDER — FENTANYL CITRATE 50 UG/ML
25 INJECTION, SOLUTION INTRAMUSCULAR; INTRAVENOUS EVERY 5 MIN PRN
Status: DISCONTINUED | OUTPATIENT
Start: 2021-12-29 | End: 2021-12-29 | Stop reason: HOSPADM

## 2021-12-29 RX ORDER — OXYBUTYNIN CHLORIDE 10 MG/1
10 TABLET, EXTENDED RELEASE ORAL DAILY
Qty: 30 TABLET | Refills: 0 | Status: ON HOLD | OUTPATIENT
Start: 2021-12-29 | End: 2022-01-12 | Stop reason: SDUPTHER

## 2021-12-29 RX ORDER — TAMSULOSIN HYDROCHLORIDE 0.4 MG/1
0.4 CAPSULE ORAL
Qty: 90 CAPSULE | Refills: 0 | Status: ON HOLD | OUTPATIENT
Start: 2021-12-29 | End: 2022-01-12 | Stop reason: HOSPADM

## 2021-12-29 RX ORDER — CIPROFLOXACIN 2 MG/ML
400 INJECTION, SOLUTION INTRAVENOUS
Status: COMPLETED | OUTPATIENT
Start: 2021-12-29 | End: 2021-12-29

## 2021-12-29 RX ORDER — OXYBUTYNIN CHLORIDE 5 MG/1
10 TABLET, EXTENDED RELEASE ORAL ONCE
Status: COMPLETED | OUTPATIENT
Start: 2021-12-29 | End: 2021-12-29

## 2021-12-29 RX ORDER — HYDROCODONE BITARTRATE AND ACETAMINOPHEN 5; 325 MG/1; MG/1
1 TABLET ORAL EVERY 6 HOURS PRN
Qty: 15 TABLET | Refills: 0 | Status: SHIPPED | OUTPATIENT
Start: 2021-12-29 | End: 2021-12-29 | Stop reason: HOSPADM

## 2021-12-29 RX ORDER — LIDOCAINE HCL/PF 100 MG/5ML
SYRINGE (ML) INTRAVENOUS
Status: DISCONTINUED | OUTPATIENT
Start: 2021-12-29 | End: 2021-12-29

## 2021-12-29 RX ADMIN — ONDANSETRON 4 MG: 2 INJECTION INTRAMUSCULAR; INTRAVENOUS at 12:12

## 2021-12-29 RX ADMIN — LIDOCAINE HYDROCHLORIDE 100 MG: 20 INJECTION INTRAVENOUS at 10:12

## 2021-12-29 RX ADMIN — PHENAZOPYRIDINE HYDROCHLORIDE 200 MG: 200 TABLET ORAL at 02:12

## 2021-12-29 RX ADMIN — MIDAZOLAM HYDROCHLORIDE 2 MG: 1 INJECTION, SOLUTION INTRAMUSCULAR; INTRAVENOUS at 10:12

## 2021-12-29 RX ADMIN — DEXAMETHASONE SODIUM PHOSPHATE 4 MG: 4 INJECTION, SOLUTION INTRA-ARTICULAR; INTRALESIONAL; INTRAMUSCULAR; INTRAVENOUS; SOFT TISSUE at 10:12

## 2021-12-29 RX ADMIN — ONDANSETRON 4 MG: 2 INJECTION INTRAMUSCULAR; INTRAVENOUS at 10:12

## 2021-12-29 RX ADMIN — FENTANYL CITRATE 50 MCG: 50 INJECTION, SOLUTION INTRAMUSCULAR; INTRAVENOUS at 11:12

## 2021-12-29 RX ADMIN — OXYBUTYNIN CHLORIDE 10 MG: 5 TABLET, EXTENDED RELEASE ORAL at 02:12

## 2021-12-29 RX ADMIN — PROPOFOL 200 MG: 10 INJECTION, EMULSION INTRAVENOUS at 10:12

## 2021-12-29 RX ADMIN — CIPROFLOXACIN 400 MG: 2 INJECTION INTRAVENOUS at 10:12

## 2021-12-29 RX ADMIN — FENTANYL CITRATE 50 MCG: 50 INJECTION, SOLUTION INTRAMUSCULAR; INTRAVENOUS at 10:12

## 2021-12-29 RX ADMIN — SODIUM CHLORIDE, SODIUM GLUCONATE, SODIUM ACETATE, POTASSIUM CHLORIDE, MAGNESIUM CHLORIDE, SODIUM PHOSPHATE, DIBASIC, AND POTASSIUM PHOSPHATE: .53; .5; .37; .037; .03; .012; .00082 INJECTION, SOLUTION INTRAVENOUS at 10:12

## 2021-12-29 NOTE — OP NOTE
[facility]  Operative Note    Date: 12/29/2021    Pre-Op Diagnosis: Right renal stones and gross hematuria    Post-Op Diagnosis: same    Procedure(s) Performed:   1.  Right ESWL  2.  Flexible Cystoscopy   3. Right stent without strings  4. Right RGP   Fluoro < 1 h    Stent indications: stones did not appear to fragment entirely    Specimen(s): none    Staff Surgeon: Falguni Keys MD    Findings:   Cystoscopy with no tumors or lesions. Urine clear.     kub 12/14          After eswl: decided to leave stent when retrograde did not show the lower pole filling out and concerned stone fragments may not have fragmented or pass.         Estimated Blood Loss: none    Drains:   1.  6 x 24 Right Double J ureteral stent  Implants: * No implants in log *    Anesthesia: Monitored Local Anesthesia with Sedation    Indications:Danielle Alarcon is a 46 y.o. female with a  right renal stones presenting for definitive stone management.  The stones are radio-opaque on KUB.      Procedure in Detail:  After risks, benefits and possible complications of the procedure were explained, the patient elected to undergo the procedure and informed consent was obtained.  All questions were answered in the shamika-operative area. The patient was transferred to the cystoscopy suite and placed on the ESWL table in the supine position.  SCDs were applied and working. Time out was performed, shamika-procedural antibiotics were given. MAC anesthesia was administered.  After adequate anesthesia the patient was placed in frog leg position and prepped and draped in the usual sterile fashion.     A flexible cystoscope was introduced into the patients bladder per urethra.  This passed easily.  The entire urethra was visualized and revealed no strictures or masses.  Formal cystoscopy was performed which showed the right and left ureteral orifices in the normal anatomic position effluxing clear urine.  There were no masses or lesions  seen, no bladder stones, no diverticuli and no trabeculations.      My attention was turned to the patients right ureteral orifice.    A 0.38 guide wire was advanced up the right ureteral orifice. This was confirmed using fluoro.  I  The patient's right lower pole stone was aligned on the X-Y- and Z axis and right ESWL was begun. 1500 shocks to right lower pole. 1500 shocks to right upper pole.  3000 thousand shocks were administered at a rate of 2 shocks per second, beginning at 16 KV of power and advanced to 26 KV. Intermittent fluoroscopy was used to monitor fragmentation of the stone.    At the end of the eswl the stones appeared present but fragmented. Was planning to leave a stent on strings and remove stent if kub showed no large fragments remained but retrograde pyelogram performed (by placing ureteral catheter over wire and removing wire) did not show contrast extending to the calcifications I had shocked. Confirmed again these were the stones. No other calcifications seen in this area and the calcifications were the shape of the stones seen on ct and kub. Suspect that the lower pole neck was not filling out so therefore left a stent and if stones do not appear like they fragmented or are moving will return for stone extraction in 2 weeks.     After retrograde performed wire replaced through 5 [place] open ended ureteral catheter and 5 [place] open ended ureteral catheter removed. 6x24 stent placed over wire without strings. Flouro used to confirm placement.     The patient tolerated the procedure well and was transferred to the PACU in stable condition.      Plan:   Home with:  kub/xray next Tuesday. If stones still visible on kub then plan for stone extraction on wed jan 12th here (with anesthesia)    If no stone visible then will plan to remove stent on Monday jan 10th at the ambulatory surgical center (with no anesthesia)    flomax 0.4mg nightly for stent pain  Can't take norco but can tolerate  tramadol  toradol 10mg (wait 2 days before starting) and alternate with norco for pain  Finish cipro   Restart cipro 3 days before any repeat procedure. Will send in new script once we decide on plan.               Falguni Keys MD

## 2021-12-29 NOTE — DISCHARGE INSTRUCTIONS
"We hope your stay was comfortable as you heal now, mend and rest.    For we have enjoyed taking care of you by giving your our best.    And as you get better, by regaining your health and strength;   We count it as a privilege to have served you and hope your time at [facility] was well spent.      Thank  You!!!Discharge Instructions: After Your Surgery/Procedure  Youve just had surgery. During surgery you were given medicine called anesthesia to keep you relaxed and free of pain. After surgery you may have some pain or nausea. This is common. Here are some tips for feeling better and getting well after surgery.     Stay on schedule with your medication.   Going home  Your doctor or nurse will show you how to take care of yourself when you go home. He or she will also answer your questions. Have an adult family member or friend drive you home.      For your safety we recommend these precaution for the first 24 hours after your procedure:  · Do not drive or use heavy equipment.  · Do not make important decisions or sign legal papers.  · Do not drink alcohol.  · Have someone stay with you, if needed. He or she can watch for problems and help keep you safe.  · Your concentration, balance, coordination, and judgement may be impaired for many hours after anesthesia.  Use caution when ambulating or standing up.     · You may feel weak and "washed out" after anesthesia and surgery.      Subtle residual effects of general anesthesia or sedation with regional / local anesthesia can last more than 24 hours.  Rest for the remainder of the day or longer if your Doctor/Surgeon has advised you to do so.  Although you may feel normal within the first 24 hours, your reflexes and mental ability may be impaired without you realizing it.  You may feel dizzy, lightheaded or sleepy for 24 hours or longer.      Be sure to go to all follow-up visits with your doctor. And rest after your surgery for as long as your doctor tells you " to.  Coping with pain  If you have pain after surgery, pain medicine will help you feel better. Take it as told, before pain becomes severe. Also, ask your doctor or pharmacist about other ways to control pain. This might be with heat, ice, or relaxation. And follow any other instructions your surgeon or nurse gives you.  Tips for taking pain medicine  To get the best relief possible, remember these points:  · Pain medicines can upset your stomach. Taking them with a little food may help.  · Most pain relievers taken by mouth need at least 20 to 30 minutes to start to work.  · Taking medicine on a schedule can help you remember to take it. Try to time your medicine so that you can take it before starting an activity. This might be before you get dressed, go for a walk, or sit down for dinner.  · Constipation is a common side effect of pain medicines. Call your doctor before taking any medicines such as laxatives or stool softeners to help ease constipation. Also ask if you should skip any foods. Drinking lots of fluids and eating foods such as fruits and vegetables that are high in fiber can also help. Remember, do not take laxatives unless your surgeon has prescribed them.  · Drinking alcohol and taking pain medicine can cause dizziness and slow your breathing. It can even be deadly. Do not drink alcohol while taking pain medicine.  · Pain medicine can make you react more slowly to things. Do not drive or run machinery while taking pain medicine.  Your health care provider may tell you to take acetaminophen to help ease your pain. Ask him or her how much you are supposed to take each day. Acetaminophen or other pain relievers may interact with your prescription medicines or other over-the-counter (OTC) drugs. Some prescription medicines have acetaminophen and other ingredients. Using both prescription and OTC acetaminophen for pain can cause you to overdose. Read the labels on your OTC medicines with care. This  will help you to clearly know the list of ingredients, how much to take, and any warnings. It may also help you not take too much acetaminophen. If you have questions or do not understand the information, ask your pharmacist or health care provider to explain it to you before you take the OTC medicine.  Managing nausea  Some people have an upset stomach after surgery. This is often because of anesthesia, pain, or pain medicine, or the stress of surgery. These tips will help you handle nausea and eat healthy foods as you get better. If you were on a special food plan before surgery, ask your doctor if you should follow it while you get better. These tips may help:  · Do not push yourself to eat. Your body will tell you when to eat and how much.  · Start off with clear liquids and soup. They are easier to digest.  · Next try semi-solid foods, such as mashed potatoes, applesauce, and gelatin, as you feel ready.  · Slowly move to solid foods. Dont eat fatty, rich, or spicy foods at first.  · Do not force yourself to have 3 large meals a day. Instead eat smaller amounts more often.  · Take pain medicines with a small amount of solid food, such as crackers or toast, to avoid nausea.     Call your surgeon if  · You still have pain an hour after taking medicine. The medicine may not be strong enough.  · You feel too sleepy, dizzy, or groggy. The medicine may be too strong.  · You have side effects like nausea, vomiting, or skin changes, such as rash, itching, or hives.       If you have obstructive sleep apnea  You were given anesthesia medicine during surgery to keep you comfortable and free of pain. After surgery, you may have more apnea spells because of this medicine and other medicines you were given. The spells may last longer than usual.   At home:  · Keep using the continuous positive airway pressure (CPAP) device when you sleep. Unless your health care provider tells you not to, use it when you sleep, day or  night. CPAP is a common device used to treat obstructive sleep apnea.  · Talk with your provider before taking any pain medicine, muscle relaxants, or sedatives. Your provider will tell you about the possible dangers of taking these medicines.  © 1600-1222 [employer]. [address] [place] [id number]. All rights reserved. This information is not intended as a substitute for professional medical care. Always follow your healthcare professional's instructions.  Post op instructions for prevention of DVT  What is deep vein thrombosis?  Deep vein thrombosis (DVT) is the medical term for blood clots in the deep veins of the leg.  These blood clots can be dangerous.  A DVT can block a blood vessel and keep blood from getting where it needs to go.  Another problem is that the clot can travel to other parts of the body such as the lungs.  A clot that travels to the lungs is called a pulmonary embolus (PE) and can cause serious problems with breathing which can lead to death.  Am I at risk for DVT/PE?  If you are not very active, you are at risk of DVT.  Anyone confined to bed, sitting for long periods of time, recovering from surgery, etc. increases the risk of DVT.  Other risk factors are cancer diagnosis, certain medications, estrogen replacement in any form,older age, obesity, pregnancy, smoking, history of clotting disorders, and dehydration.  How will I know if I have a DVT?   Swelling in the lower leg   Pain   Warmth, redness, hardness or bulging of the vein  If you have any of these symptoms, call your doctors office right away.  Some people will not have any symptoms until the clot moves to the lungs.  What are the symptoms of a PE?   Panting, shortness of breath, or trouble breathing   Sharp, knife-like chest pain when you breathe   Coughing or coughing up blood   Rapid heartbeat  If you have any of these symptoms or get worse quickly, call 911 for emergency treatment.  How can I  prevent a DVT?   Avoid long periods of inactivity and dont cross your legs--get up and walk around every hour or so.   Stay active--walking after surgery is highly encouraged.  This means you should get out of the house and walk in the neighborhood.  Going up and down stairs will not impair healing (unless advised against such activity by your doctor).     Drink plenty of noncaffeinated, nonalcoholic fluids each day to prevent dehydration.   Wear special support stockings, if they have been advised by your doctor.   If you travel, stop at least once an hour and walk around.   Avoid smoking (assistance with stopping is available through your healthcare provider)  Always notify your doctor if you are not able to follow the post operative instructions that are given to you at the time of discharge.  It may be necessary to prescribe one of the medications available to prevent DVT.Using an Incentive Spirometer    An incentive spirometer is a device that helps you do deep breathing exercises. These exercises expand your lungs, aid in circulation, and help prevent pneumonia. Deep breathing exercises also help you breathe better and improve the function of your lungs by:  · Keeping your lungs clear  · Strengthening your breathing muscles  · Helping prevent respiratory complications or problems  The incentive spirometer gives you a way to take an active part in recover. A nurse or therapist will teach you breathing exercises. To do these exercises, you will breathe in through your mouth and not your nose. The incentive spirometer only works correctly if you breathe in through your mouth.  Steps to clear lungs  Step 1. Exhale normally. Then, inhale normally.  · Relax and breathe out.  Step 2. Place your lips tightly around the mouthpiece.  · Make sure the device is upright and not tilted.  Step 3. Inhale as much air as you can through the mouthpiece (don't breath through your nose).  · Inhale slowly and deeply.  · Hold  your breath long enough to keep the balls or disk raised for at least 3 to 5 seconds, or as instructed by your healthcare provider.  · Some spirometers have an indicator to let you know that you are breathing in too fast. If the indicator goes off, breathe in more slowly.  Step 4. Repeat the exercise regularly.  · Do this exercise every hour while you're awake, or as instructed by your healthcare provider.  · If you were taught deep breathing and coughing exercises, do them regularly as instructed by your healthcare provider.

## 2021-12-29 NOTE — CARE UPDATE
Spoke with bashir:  Sent in tramadol instead of norco which makes her sick  Nausea improved with improvement of cramps after she received ditropan and pyridium

## 2021-12-29 NOTE — H&P
[facility] H&P Note - [place]  Staff: MD Massimo  PCP: Rufino Blancas MD  Date of Service: 2021      Subjective:     HPI: Danielle Alarcon is a 46 y.o. female     Initial consult by me in clinic on 21:  2021- she had urgency/frequency which was new. She passed 2 small tiny clots/stones? Saw pcp who ordered a urine test which showed blood. She then passed a few more clots/stones. She had a CTRSS on 21 which showed a 6mm RLP and 3mm RMP stone. Continued to have urgency, intermittent cloudy urine up until a few days ago after her CT. No flank pain. Not prone to recurrent utis.    No personal or family history of stones. No blood in urine personal or family. 6 pack year hx of smoking, quit 20+ yrs ago. On no blood thinners. Had a hysterectomy     Since she passed the stone she's had singificant urgency. The frequency resolved. She cannot do voided urine sample. Was found to have e.coli uti. Treated with cipro      ctrss 21  RLP 6mm stone      RMP 4mm stone      11.5cm SSD, 450 HU      12.2cm, 770 HU      Kub 21        Interval history 21:   Here today for R ESWL  Urgency improved with abx for e.coli uti       Urine history: grandfather  of bladder cancer (smoker)  21 E.coli res to augmentin- tx with cipro starting , cyt: acute inflammation and bacteria  11/3/21 2+bld/ tr leuk, 4 rbc/5 wbc    REVIEW OF SYSTEMS:  Negative except for as stated above    Past Medical History:   Diagnosis Date    Anesthesia complication     delayed urination    Asthma     allergy induced    Bronchial pneumonia         Depression     GERD (gastroesophageal reflux disease)     Gestational diabetes     History of bronchitis     Hypertension     Postoperative urinary retention        Past Surgical History:   Procedure Laterality Date    CHOLECYSTECTOMY      COLONOSCOPY N/A 7/15/2021    Procedure: COLONOSCOPY;  Surgeon: Jackie Soares MD;  Location:  [facility] END[facility];  Service: Endoscopy;  Laterality: N/A;    ESOPHAGOGASTRODUODENOSCOPY N/A 7/15/2021    Procedure: ESOPHAGOGASTRODUODENOSCOPY (EGD);  Surgeon: Jackie Soares MD;  Location: [facility];  Service: Endoscopy;  Laterality: N/A;    TONSILLECTOMY      TOTAL ABDOMINAL HYSTERECTOMY N/A 1/24/2020    Procedure: HYSTERECTOMY, TOTAL, ABDOMINAL;  Surgeon: Sonia Bird MD;  Location: [facility] OR;  Service: OB/GYN;  Laterality: N/A;            Objective:     Vitals:    12/29/21 0937   BP: (!) 158/72   Pulse:    Resp:    Temp:        General:wdwn  in NAD  Neurologic: CN grossly normal. Normal sensation.   Psychiatric: awake, alert and oriented x 3. Mood and affect Normal. Cooperative.  Eyes: PERRLA, normal conjunctiva  Respiratory: no increased work on breathing. No wheezing.   Cardiovascular: No obvious extremity edema. Warm and well perfused.  GI: no obvious stomach distension  Musculoskeletal: normal  range of motion of bilateral upper extremities. Normal muscle strength and tone.  Skin: no obvious rashes or lesions. No tightening of skin noted.    Pertinent  exam in HPI          Assessment:     Dnaielle Alarcon is a 46 y.o. female with gross hematuria, uti and kidney stones. GH likely from recent uti and possibly passed stone. Still has 2 right renal stones.     Kidney stones- RLP 6mm, RMP 4mm. Visible on kub.  Possible stent if it doesn't break up.  On no blood thinners. If stone not visible will cancel eswl and watch and wait but still needs a cystoscopy for gross hematuria.     1. Renal stones          Plan:       Cystoscopy for gross hematuria + urgency     Extracorporeal shock wave lithotripsy + CYSTOSCOPY for gross hematuria+/- STENT is planned for right lower pole and right mid pole STONE on 12/29:  If stones do not fragment place stent and return in 2 weeks  If one stone fragment then continue to watch remaining stone  Xray in 1 month   24 hour urine not indicated  At this time- these are her  first stone.     The patient does not have more stones in left kidney.   The patient was counseled that if the stone does not appear to break up that I can place a stent and return for stone extraction 2-3 weeks later.   The patient was counseled to stop any blood thinners including Goody's Powder, BC Powder 1 week prior the procedure.       Special considerations:    The patient is scheduled for ESWL. The risks and benefits of extracorporeal shock wave lithotripsy were discussed with the patient in detail.  Consent was obtained.  The risks include but are not limited to bleeding in or around the kidney, infection, pain, incomplete fragmentation of the stone requiring a repeat treatment or a different form of treatment, obstruction of the kidney by stone particles possibly requiring a ureteral stent or nephrostomy tube, injury to or loss of the kidney ,injury to the ureter or bladder, need for further procedures, hypertension (transient or permanent), recurrence of stones, and need for open surgery.  Alternative treatments were also discussed with the patient in detail to include ureteroscopy, percutaneous treatment of the stone, open surgery  and observation. Patient understands these risks and has agreed to proceed with surgery.          Stent consent:  The patient was counseled extensively on the stent being only temporary. The pt understands that a stent should not be in longer than 1 year, and preferably less than 3 months. A stent can become encrusted, block urine flow and cause kidney function loss if it is not exchanged or removed at least within a year.  They understand that if the stent remains longer becomes encrusted it would require major surgeries and possible nephrectomy to remove the stent and or stones or affected kidney if unable to treat the stones endoscopically.          Falguni Keys MD

## 2021-12-29 NOTE — PLAN OF CARE
Patient received form pacu via bed with spouse at side. Denies any difficulties and stated she had to urinate. To be discharged home with spouse with handout, all valuables were returned

## 2021-12-29 NOTE — PLAN OF CARE
"Patient was discharged home with spouse after  Receiving ditropan and pyridium as per Dr. Barry orders. Patient was also made aware of the discontinued hydrocodone due to her allergy, and new pain medication ordered. She voided several times, and has a few sips of water. Patient stated the "menstrual cramp feelings that make me get nauseated are better". Dr. Keys was kept updated via mayo. Patient and  Spouse stated understanding of discharge instructions and stated readiness to go home.  "

## 2021-12-29 NOTE — DISCHARGE SUMMARY
[facility]  Urology  Discharge Note - Short Stay      Patient Name: Danielle Alarcon  MRN: [id number]  Discharge Date and Time:  12/29/2021 12:33 PM  Attending Physician: Falguni Keys,*   Discharging Provider: Falguni Keys MD  Primary Care Physician: Rufino Blancas MD    There are no hospital problems to display for this patient.      Final Diagnoses: Same as principal problem.    Hospital Course: Patient was admitted for an outpatient procedure and tolerated the procedure well with no complications.*    Procedure(s) (LRB):  flexible cystoscopy for gross hematuria and Right eswl (Right)  CYSTOSCOPY, WITH URETERAL STENT INSERTION (Right)     Indwelling Lines/Drains at time of discharge:   Lines/Drains/Airways     None                 Discharged Condition: good    Disposition: home    Follow Up:      Patient Instructions:      X-Ray Abdomen AP 1 View   Standing Status: Future Standing Exp. Date: 12/29/22     Order Specific Question Answer Comments   Reason for Exam: s.p eswl        Medications:  Reconciled Home Medications:      Medication List      START taking these medications    HYDROcodone-acetaminophen 5-325 mg per tablet  Commonly known as: NORCO  Take 1 tablet by mouth every 6 (six) hours as needed for Pain. Post op  Stone procedure pain meds     ketorolac 10 mg tablet  Commonly known as: TORADOL  Take 1 tablet (10 mg total) by mouth every 8 (eight) hours as needed for Pain.     ondansetron 4 MG Tbdl  Commonly known as: ZOFRAN-ODT  Take 2 tablets (8 mg total) by mouth 2 (two) times daily.     tamsulosin 0.4 mg Cap  Commonly known as: FLOMAX  Take 1 capsule (0.4 mg total) by mouth after dinner.        CONTINUE taking these medications    desvenlafaxine succinate 50 MG Tb24  Commonly known as: PRISTIQ  Take 1 tablet (50 mg total) by mouth once daily.     ferrous sulfate 324 mg (65 mg iron) Tbec  Take 1 tablet (324 mg total) by mouth once daily.     irbesartan 150 MG  tablet  Commonly known as: AVAPRO  Take 1 tablet (150 mg total) by mouth once daily. HOLD AM OF SURGERY     multivitamin per tablet  Commonly known as: THERAGRAN  Take 1 tablet by mouth once daily. HOLD AM OF SURGERY     pantoprazole 40 MG tablet  Commonly known as: PROTONIX  Take 1 tablet (40 mg total) by mouth once daily.     SUPREP BOWEL PREP KIT 17.5-3.13-1.6 gram Solr  Generic drug: sodium,potassium,mag sulfates  Take as instructed on prep sheet     TURMERIC ORAL  Take 3 capsules by mouth once daily. Hold until after surgery        ASK your doctor about these medications    ciprofloxacin HCl 500 MG tablet  Commonly known as: CIPRO  Take 1 tablet (500 mg total) by mouth 2 (two) times daily. for 7 days  Ask about: Should I take this medication?            Discharge Procedure Orders (must include Diet, Follow-up, Activity):   Discharge Procedure Orders (must include Diet, Follow-up, Activity)   X-Ray Abdomen AP 1 View   Standing Status: Future Standing Exp. Date: 12/29/22     Order Specific Question Answer Comments   Reason for Exam: s.p eswl       Plan:   Home with:  kub/xray next Tuesday. If stones still visible on kub then plan for stone extraction on wed jan 12th here (with anesthesia)    If no stone visible then will plan to remove stent on Monday jan 10th at the ambulatory surgical center (with no anesthesia)    flomax 0.4mg nightly for stent pain  norco 5 dispense 15 as needed for pain  toradol 10mg (wait 2 days before starting) and alternate with norco for pain  Finish cipro   Restart cipro 3 days before any repeat procedure. Will send in new script once we decide on plan.             Falguni Keys MD  Urology  [facility]

## 2021-12-29 NOTE — PATIENT INSTRUCTIONS
Your [facility] urologist is Dr.Jennifer CARY Keys   Her office is located at [address] [place] [address] , [address] [place] [address]   Her office number is [phone] for any questions during office hours   The hospital number and on call physician number for after hour calls is [phone]   She encourages you to sign up for [facility] and you can easily communicate with messaging through this shannon   If you do not have any follow-up scheduled, please confirm that no follow up was needed     [facility] post-op instructions for :    Instructions specific to this patient:  Plan:   Home with:  kub/xray next Tuesday. If stones still visible on kub then plan for stone extraction on wed jan 12th here (with anesthesia)    If no stone visible then will plan to remove stent on Monday jan 10th at the ambulatory surgical center (with no anesthesia)    flomax 0.4mg nightly for stent pain  norco 5 dispense 15 as needed for pain  toradol 10mg (wait 2 days before starting) and alternate with norco for pain  Finish cipro   Restart cipro 3 days before any repeat procedure. Will send in new script once we decide on plan.            Home with:  -urine strainer: strain all urine and bring fragments to appointment so we can send for analysis    Home medications and prescriptions:  -Flomax/tamsulosin 0.4mg by mouth nightly for 3 weeks to help stretch ureter and pass stone fragment (can cause lightheadness if you stand up too fast, so take at night)  -tramadol or norco/hydrocodone 5, take every 4 to 6 hours as needed (dispense  15)-take miralax 17g daily while taking pain medicine  -you can restart any blood thinners 2 days later if urine is clear yellow, if not, and patient is unsure of when to restart then call office. If planning on having a planned ureteroscopy in two weeks then will remain off blood thinners until then. If patient is scheduled for a cystoscopy stent removal only can start the blood thinners as  recommended.     Follow up:  -You may possibly follow up in clinic in 4 weeks and most likely with the nurse practitioner. You should have an xray scheduled before your follow-up to see how you responded to the shock wave. If you do not have this scheduled (xray or follow-up, please call the clinic within 2 days of your procedure).    -if you still have more stones we will continue to monitor these and decide on treatment once they are larger  -if this is not your first stone then we will likely proceed with a workup to figure out why you make stones (this includes a 24 hour urine collection and some blood tests)  -Bring stone fragments with you to your next appointment.    A stent was placed:  See stent instructions    You have a stone in lower pole (or bottom of the kidney), so I recommend inversion therapy   You will drink 1 bottle of water firstand 15 minutes later you will  charanjit over and have someone or yourself palpate your back for about a minute to see if we can get the stone fragments to move up and out (over the hill out of the bottom of the kidney)   You will do this 3 times a day for a total of 3 days      Other instructions  You may see some blood in your urine while the stone fragments are passing and a few days afterward. Do not be alarmed, even if the urine was clear for a while. Push fluids and refrain from strenuous activity until clearing occurs. If you have difficulty passing clots or don't improve, call us. You can also try sitting in a warm tub of water to help to urinate if needed. Avoid medications such as Aspirin, Advil, Motrin, Plavix, or Coumadin, which thin the blood and cause bleeding.  If you have never had your stones analyzed, strain all urine and bring stone fragments with you to your next appointment.    Diet:  You may return to your normal diet immediately. Alcohol, spicy foods, acidy foods and drinks with caffeine may cause irritation or frequency and should be used in  moderation. To keep your urine flowing freely and to avoid constipation, drink plenty of fluids during the day (8-10 glasses).    Activity:  While the kidney is healing do not engage in strenuous activity. If you are active, you may see more blood in the urine. We would suggest cutting down your activity under these circumstances until the bleeding has stopped, perhaps two weeks.    Bowels:  It is important to keep your bowels regular during the postoperative period. Straining with bowel movements can cause bleeding. A bowel movement every other day is reasonable. Use a mild over-the-counter laxative if needed, such as Milk of Magnesia 2-3 tablespoons, or 1-2 Dulcolax tablets. Call if you continue to have problems. Narcotics can worsen constipation; if you had been taking narcotics for pain, before, during or after your surgery, you may be constipated. Ditropan for bladder spasms may also cause constipation.    Problems you should report to us:  1. Fevers over 101.5 degrees Fahrenheit  2. Inability to urinate.   3. Drug reactions (hives, rash, nausea, vomiting, diarrhea)   4. Severe burning or pain with urination that is not improving.   5. Severe pain in your kidney not relieved with pain medications, go to ER if this occurs. You could be passing a large stone or have a blood clot around the kidney. They will likely do a ct scan to determine. Please call our office so we  know that you are heading to the ER.   6. You will also have some burning with urination. This is normal after stone therapy and is also expected while the stent is in place. This burning should be mild or moderate and should improve over time. Severe burning, especially when it is not improving, can be a sign of infection.    Call Urology at  with any questions   '    VERY IMPORTANT INSTRUCTIONS FROM  REGARDING YOUR PROCEDURE- PLEASE READ    Your urologist is Dr.Jennifer Keys  Office number: [phone] ([facility])  Address: [address] [place] [address],  (2nd office building on left); [address] (located on 2nd floor).     What  did 12/29/2021:     The following are specific instructions for Danielle Alarcon is a 46 y.o. female, so please read CAREFULLY:    If you are on blood thinners and are unsure whether to continue, stop or restart them and it is not mentioned above, then call 's office for further directions    Plan:   Home with:  kub/xray next Tuesday. If stones still visible on kub then plan for stone extraction on wed jan 12th here (with anesthesia)    If no stone visible then will plan to remove stent on Monday jan 10th at the ambulatory surgical center (with no anesthesia)    flomax 0.4mg nightly for stent pain  tramadol  toradol 10mg (wait 2 days before starting) and alternate with norco for pain  Finish cipro   Restart cipro 3 days before any repeat procedure. Will send in new script once we decide on plan.           You have a ureteral stent in your right collecting system that was placed on 12/29/2021  -the stent can only remain for a maximum of 3 to 6 months. If the stent remains longer than this you can get recurrent urinary tract infections, the stent can have more stones form along it and urine will not be able to drain and you will lose all function of your kidney requiring a major surgery and a big incision to remove the kidney.   The ureter is the tube that drains the kidney (where urine is made). It connects the kidney to the bladder (where urine is stored).    A ureteral stent is a is a soft plastic tube with holes in tube that bypasses anything that obstructs (stone, tumor, scar tissue) the urine from flow from the kidney to the bladder. It is placed by going through the urethra and into the bladder. No incisions are made. Its temporarily inserted into a ureter to help drain urine into the bladder. One end goes in the kidney. The other end goes in the  bladder. A coil on each end holds the stent in place. The stent cant be seen from outside the body.    If you do not receive a follow-up date or further instructions on what is to be done next about the stent, it is your responsibility to make sure that you have follow-up to have your stone or stent removed.      A stent CANNOT remain indefinitely in the kidney. It should not remain if possible more than 3 to 6 months maximum (rarely 1 year if it was placed for cancer).      If you do not follow-up to have your stent removed then you can LOSE YOUR KIDNEY FUNCTION ON THAT SIDE, get RECURRENT URINARY TRACT INFECTIONS,and MORE STONES requiring SURGICAL OPEN removal of your kidney.     You can call our office ([facility] at [phone] and let them know a stent was placed and you need further instructions for follow-up). If there are issues with insurance we will work with you to help you arrange follow-up where it can be removed. Again,  A STENT CANNOT remain indefinitely. Y      A ureteral stent is left in place for many reasons:   to keep the ureter open after a procedure as there will be much inflammation and if no stent is left you will experience the same pain as having the stone that caused the obstruction.    to drain the kidney of infection.   if the stone is up high and closer to the kideny, if the stone is stuck, or you have an infection, the stent will stretch open the small ureter (the tube that drains the kidney) for a few weeks (usually 2 to 4 weeks max) so that we can return, remove the old stent, place instruments into the ureter or kidney to break up and remove the stone. Sometimes you may need another stent after this procedure.     Ureteral Stent Symptoms can include but are not limited to    Stabbing pain in the kidney or bladder with urination during or especially at the end of voiding. Usually we write you for flomax/tamsulosin and toradol/ketorolac to help stretch and  relax the ureter while you have the stent in place. We cannot write for toradol/ketorolac if you have poor kidney function. Please call if you have not received these prescriptions.   constant urge to urinate, frequency of urination, severe bladder spasms and severe pain the kidney, especially during urination. If this is very bothersome we can write you for ditropan, an overactive bladder medication to take short term to help with the spasms.    blood in the urine, especially with increased activity. This can last the entire time the stent is in, it can sometimes clear and return or you may never have any at all. I recommend increasing fluid intake to prevent large clots that may be difficult to urinate out.    Explanation for medications that may have been written for. See the instructions above to see what you were sent home with   Toradol/ketorolac 10mg (dispense 10)- this is a stronger form of ibuprofen, you can take up to every 8 hours but stop taking ibuprofen. Too much of this medicine can cause kidney failure or stomach ulcers. This helps with the inflammation the body is experiencing from the stent. Take this with food. If you have kidney disease then do not take this medication, even if it was written for you.   Norco/Hydrocodone 5mg (dispense 15)-  take 1 every 4 to 6 hours for pain not relieved with ibuprofen or Tylenol. If you are taking this regularly you will need to take miralax or stool softeners daily to prevent constipation.    Flomax/Tamsulosin 0.4mg mg (dispense 30) - take once nightly before bedtime (at least 3 hours apart from other high blood pressure medicines) while the stent is in and for 1 week after stent removed to help relax the tube the stent is in. If you are taking amlodipine/norvasc for blood pressure, take at least four hours apart (preferably norvasc in morning and flomax/tamsulosin after dinner)   Antibiotics - antibiotics were given just before your procedure that will  stay in your system for a while.  If you were written for oral antibiotics, take twice daily. Your doctor will specify amount of days to take. If you have another scheduled procedure to have the stone removed more than 2 weeks after the initial procedure, she may ask you to save antibiotics and restart them 3 days before your next procedure.    AZO over the counter -  you can find this at [facility]. Choose one for bladder pain and anti-spasmodic. You can take it up to 3x a day for 3-4 days as long as you have normal kidney function. It may stain your urine and your clothes   Potassium citrate 15meq Twice a day to help increase dietary citrate and prevent stone formation on the stents - expect to see this in the stool, the coating contains the medication.    Ditropan 10mg/Oxybutynin 10mg XL, dispense 30 (take once daily as needed for bladder spasms- can cause constipation, take with stool softeners or fiber gummies)    When to go to ER:    fever >101.5 or inability to urinate (no urination for >4 hours and bladder pain) due to thick clots   If you go to the ER with pain, they may check to make sure the stent is in good position with an x-ray or ultrasound but unlikely to do anything else.    I will let you know when we will plan to have the stent either removed at the ambulatory surgical center as an outpatient procedure while you are awake, which is uncomfortable briefly, but not painful or you will remove it yourself by pulling the strings.    6 Easy Ways to Prevent Kidney Stones - please read!!!   Taken from: https://www.kidney.org/atoz/content/kidneystones_prevent    Don't Underestimate Your Sweat. Saunas, hot yoga and heavy exercise may be good for your health, but they also may lead to kidney stones. Why? Loss of water through sweating - whether due to these activities or just the heat of summer--leads to less urine production. The more you sweat, the less you urinate, which allows for stone-causing minerals to  "settle and bond in the kidneys and urinary tract.  Instead: Hydrate with H2O. One of the best measures you can take to avoid kidney stones is to drink plenty of water, leading you to urinate a lot. So, be sure to keep well hydrated, especially when engaging in exercise or activities that cause a lot of sweating.    It's Not Just the Oxalate. Oxa-what? Oxalate is naturally found in many foods, including fruits and vegetables, nuts and seeds, grains, legumes, and even chocolate and tea. Some examples of foods that contain high levels of oxalate include: peanuts, rhubarb, spinach, beets, chocolate and sweet potatoes. Moderating intake of these foods may be beneficial for people who form calcium oxalate stones, the leading type of kidney stones. A common misconception is that cutting the oxalate-rich foods in your diet alone will reduce the likelihood of forming calcium oxalate kidney stones. While in theory this might be true, this approach isn't smart from an overall health perspective. Most kidney stones are formed when oxalate binds to calcium while urine is produced by the kidneys.  Instead: Eat and drink calcium and oxalate-rich foods together during a meal. In doing so, oxalate and calcium are more likely to bind to one another in the stomach and intestines before the kidneys begin processing, making it less likely that kidney stones will form.    Calcium is Not the Enemy. But it tends to get a bad rap! Most likely due to its name and composition, many are under the impression that calcium is the main culprit in calcium-oxalate stones. "I still see patients who wonder why they are getting recurring stones despite cutting down on their calcium intake," said Dr. Hernández. "I've even had patients say that their doctors told them to reduce their calcium intake." A diet low in calcium actually increases one's risk of developing kidney stones.  Instead: Don't reduce the calcium. Work to cut back on the sodium in your " "diet and to pair calcium-rich foods with oxalate-rich foods.  It's Not One and Done. Passing a kidney stone is often described as one of the most painful experiences a person can have, but unfortunately, it's not always a one-time event. Studies have shown that having even one stone greatly increases your chances of having another. "Most people will want to do anything they can to ensure it doesn't happen again," said Dr. Hernández. "Unfortunately, it doesn't seem to be the case that people make the changes they need to after their first stone event." Research conducted by Dr. Hernández shows that those with kidney stones do not always heed the advice of their nephrologists and urinary specialists. About 15% of kidney stone patients didn't take prescribed medications and 41% did not follow the nutritional advice that would keep stones from recurring.  Instead: Take action! Without the right medications and diet adjustments, stones can come back, and recurring kidney stones also could be an indicator of other problems, including kidney disease.    When Life Hands You Kidney Stones don't fret. And as the saying goes, "make lemonade." It's important to consider dietary remedies alongside prescription medications. While it may seem easier to just take a pill to fix a medical problem, consider what lifestyle changes will also make a big impact on your health.  Instead: Next time you drive past a lemonade (or limeade) stand, consider your kidneys. Chronic kidney stones are often treated with potassium citrate, but studies have shown that limeade, lemonade and other fruits and juices high in natural citrate offers the same stone-preventing benefits. Beware of the sugar, though, because it can increase kidney stone risk.   Instead, buy sugar-free lemonade, or make your own by mixing lime or lemon juice with water and using a sugar substitute if needed. "We believe that citrate in the urine may prevent the calcium from binding " "with other constituents that lead to stones," said Dr. Hernández. "Also, some evidence suggests that citrate may prevent crystals that are already present from binding with each other, thus preventing them from getting bigger."    Not All Stones are Created Equal. In addition to calcium oxalate stones, another common type of kidney stones is uric acid stones. Red meat, organ meats, and shellfish have high concentrations of a natural chemical compound known as purines. "High purine intake leads to a higher production of uric acid and produces a larger acid load for the kidneys to excrete," said Dr. Hernández. Higher uric acid excretion leads to lower overall urine pH, which means the urine is more acidic. The high acid concentration of the urine makes it easier for uric acid stones to form.  Instead: To prevent uric acid stones, cut down on high-purine foods such as red meat, organ meats, and shellfish, and follow a healthy diet that contains mostly vegetables and fruits, whole grains, and low fat dairy products. Limit sugar-sweetened foods and drinks, especially those that contain high fructose corn syrup. Limit alcohol because it can increase uric acid levels in the blood and avoid crash diets for the same reason..Eating less animal-based protein and eating more fruits and vegetables will help decrease urine acidity and this will help reduce the chance for stone formation.                  "

## 2021-12-29 NOTE — PLAN OF CARE
Patient transferred to postop at this time.  AAOX3.  NAD noted.  Tolerating po intake well with no complaints of nausea/vomiting at this time.  Pain 0/10.

## 2021-12-29 NOTE — PLAN OF CARE
Pre op assessment in progress  Pt is calm, denies pain    Pt gives us permission to speak with her  regarding her medical history, surgery and discharge  Pt did state that she had a problem urinating after her gallbaldder surgery in 2015 and had to have a catheter placed to go home.

## 2021-12-30 ENCOUNTER — PATIENT MESSAGE (OUTPATIENT)
Dept: UROLOGY | Facility: CLINIC | Age: 46
End: 2021-12-30
Payer: COMMERCIAL

## 2021-12-30 VITALS
SYSTOLIC BLOOD PRESSURE: 152 MMHG | TEMPERATURE: 98 F | HEIGHT: 66 IN | DIASTOLIC BLOOD PRESSURE: 76 MMHG | HEART RATE: 57 BPM | BODY MASS INDEX: 36.16 KG/M2 | WEIGHT: 225 LBS | OXYGEN SATURATION: 100 % | RESPIRATION RATE: 18 BRPM

## 2022-01-04 ENCOUNTER — HOSPITAL ENCOUNTER (OUTPATIENT)
Dept: RADIOLOGY | Facility: HOSPITAL | Age: 47
Discharge: HOME | End: 2022-01-04
Attending: UROLOGY
Payer: COMMERCIAL

## 2022-01-04 DIAGNOSIS — N20.0 RENAL STONES: ICD-10-CM

## 2022-01-04 PROCEDURE — 74018 RADEX ABDOMEN 1 VIEW: CPT | Mod: 26,,, | Performed by: RADIOLOGY

## 2022-01-04 PROCEDURE — 74018 RADEX ABDOMEN 1 VIEW: CPT | Mod: TC,PO

## 2022-01-04 PROCEDURE — 74018 XR ABDOMEN AP 1 VIEW: ICD-10-PCS | Mod: 26,,, | Performed by: RADIOLOGY

## 2022-01-05 ENCOUNTER — PATIENT MESSAGE (OUTPATIENT)
Dept: UROLOGY | Facility: CLINIC | Age: 47
End: 2022-01-05
Payer: COMMERCIAL

## 2022-01-05 DIAGNOSIS — N20.0 RENAL STONES: Primary | ICD-10-CM

## 2022-01-05 NOTE — PROGRESS NOTES
Pt's renal stones not clearly visible on this kub but also a lot of stool. Discussed with patient- Hasn't passed many fragments. Plan was to either remove stent or return for stone extraction.     Decision was made to see if we can have a ct to eval if there are any large fragments remaining since kub not clear and if so plan for stone extraction on 1/12 or 1/19 (would want abx 3d prior). Prone to uti's.     If no large stone fragments then stent removal on a Monday. Can't do 1/17 like we discussed. [facility] closed for [facility] day. Asked her to due some inversion therapy to try to move the smaller stone fragments around - may help with ct findings.     Ctrss soon (this week or early next week if possible). Route me back with date scheduled. Do not want to put stat. Just schedule soon if possible

## 2022-01-06 ENCOUNTER — HOSPITAL ENCOUNTER (OUTPATIENT)
Dept: RADIOLOGY | Facility: HOSPITAL | Age: 47
Discharge: HOME | End: 2022-01-06
Attending: UROLOGY
Payer: COMMERCIAL

## 2022-01-06 DIAGNOSIS — N20.0 RENAL STONES: ICD-10-CM

## 2022-01-06 PROCEDURE — 74176 CT RENAL STONE STUDY ABD PELVIS WO: ICD-10-PCS | Mod: 26,,, | Performed by: RADIOLOGY

## 2022-01-06 PROCEDURE — 74176 CT ABD & PELVIS W/O CONTRAST: CPT | Mod: TC

## 2022-01-06 PROCEDURE — 74176 CT ABD & PELVIS W/O CONTRAST: CPT | Mod: 26,,, | Performed by: RADIOLOGY

## 2022-01-07 ENCOUNTER — TELEPHONE (OUTPATIENT)
Dept: UROLOGY | Facility: CLINIC | Age: 47
End: 2022-01-07
Payer: COMMERCIAL

## 2022-01-07 ENCOUNTER — PATIENT MESSAGE (OUTPATIENT)
Dept: UROLOGY | Facility: CLINIC | Age: 47
End: 2022-01-07
Payer: COMMERCIAL

## 2022-01-07 DIAGNOSIS — N20.0 NEPHROLITHIASIS: Primary | ICD-10-CM

## 2022-01-07 RX ORDER — CIPROFLOXACIN 500 MG/1
500 TABLET ORAL 2 TIMES DAILY
Qty: 6 TABLET | Refills: 0 | Status: SHIPPED | OUTPATIENT
Start: 2022-01-07 | End: 2022-01-12 | Stop reason: SDUPTHER

## 2022-01-07 NOTE — TELEPHONE ENCOUNTER
Spoke with pt and will plan to do ureteroscopy next wed.  She wants to make sure all stones gone. Went over pass rates.

## 2022-01-07 NOTE — PROGRESS NOTES
Let her know stones still there  unfortunately. Didn't respond to eslw. I can take her for stone extraction next wed if she would like. Let me know and I can put orders in. She should start abx on Monday

## 2022-01-11 ENCOUNTER — ANESTHESIA EVENT (OUTPATIENT)
Dept: SURGERY | Facility: HOSPITAL | Age: 47
End: 2022-01-11
Payer: COMMERCIAL

## 2022-01-12 ENCOUNTER — HOSPITAL ENCOUNTER (OUTPATIENT)
Facility: HOSPITAL | Age: 47
Discharge: HOME | End: 2022-01-12
Attending: UROLOGY | Admitting: UROLOGY
Payer: COMMERCIAL

## 2022-01-12 ENCOUNTER — ANESTHESIA (OUTPATIENT)
Dept: SURGERY | Facility: HOSPITAL | Age: 47
End: 2022-01-12
Payer: COMMERCIAL

## 2022-01-12 ENCOUNTER — TELEPHONE (OUTPATIENT)
Dept: UROLOGY | Facility: CLINIC | Age: 47
End: 2022-01-12
Payer: COMMERCIAL

## 2022-01-12 DIAGNOSIS — N20.0 NEPHROLITHIASIS: ICD-10-CM

## 2022-01-12 LAB
BACTERIA #/AREA URNS HPF: ABNORMAL /HPF
BILIRUB UR QL STRIP: NEGATIVE
CLARITY UR: CLEAR
COLOR UR: YELLOW
CTP QC/QA: YES
GLUCOSE UR QL STRIP: NEGATIVE
HGB UR QL STRIP: ABNORMAL
KETONES UR QL STRIP: NEGATIVE
LEUKOCYTE ESTERASE UR QL STRIP: ABNORMAL
MICROSCOPIC COMMENT: ABNORMAL
NITRITE UR QL STRIP: NEGATIVE
PH UR STRIP: 7 [PH] (ref 5–8)
PROT UR QL STRIP: NEGATIVE
RBC #/AREA URNS HPF: 25 /HPF (ref 0–4)
SARS-COV-2 AG RESP QL IA.RAPID: NEGATIVE
SP GR UR STRIP: 1.02 (ref 1–1.03)
SQUAMOUS #/AREA URNS HPF: 1 /HPF
URN SPEC COLLECT METH UR: ABNORMAL
UROBILINOGEN UR STRIP-ACNC: NEGATIVE EU/DL
WBC #/AREA URNS HPF: 3 /HPF (ref 0–5)

## 2022-01-12 PROCEDURE — D9220A PRA ANESTHESIA: ICD-10-PCS | Mod: ANES,,, | Performed by: ANESTHESIOLOGY

## 2022-01-12 PROCEDURE — 36000706: Performed by: UROLOGY

## 2022-01-12 PROCEDURE — D9220A PRA ANESTHESIA: ICD-10-PCS | Mod: CRNA,,, | Performed by: REGISTERED NURSE

## 2022-01-12 PROCEDURE — D9220A PRA ANESTHESIA: Mod: CRNA,,, | Performed by: REGISTERED NURSE

## 2022-01-12 PROCEDURE — 81000 URINALYSIS NONAUTO W/SCOPE: CPT | Performed by: UROLOGY

## 2022-01-12 PROCEDURE — 37000009 HC ANESTHESIA EA ADD 15 MINS: Performed by: UROLOGY

## 2022-01-12 PROCEDURE — 37000008 HC ANESTHESIA 1ST 15 MINUTES: Performed by: UROLOGY

## 2022-01-12 PROCEDURE — 25000003 PHARM REV CODE 250: Performed by: ANESTHESIOLOGY

## 2022-01-12 PROCEDURE — 25500020 PHARM REV CODE 255: Performed by: UROLOGY

## 2022-01-12 PROCEDURE — C2617 STENT, NON-COR, TEM W/O DEL: HCPCS | Performed by: UROLOGY

## 2022-01-12 PROCEDURE — 27201423 OPTIME MED/SURG SUP & DEVICES STERILE SUPPLY: Performed by: UROLOGY

## 2022-01-12 PROCEDURE — C1773 RET DEV, INSERTABLE: HCPCS | Performed by: UROLOGY

## 2022-01-12 PROCEDURE — 99900103 DSU ONLY-NO CHARGE-INITIAL HR (STAT)

## 2022-01-12 PROCEDURE — 52356 PR CYSTO/URETERO W/LITHOTRIPSY: ICD-10-PCS | Mod: 22,58,RT, | Performed by: UROLOGY

## 2022-01-12 PROCEDURE — 71000039 HC RECOVERY, EACH ADD'L HOUR: Performed by: UROLOGY

## 2022-01-12 PROCEDURE — 63600175 PHARM REV CODE 636 W HCPCS: Performed by: UROLOGY

## 2022-01-12 PROCEDURE — C1769 GUIDE WIRE: HCPCS | Performed by: UROLOGY

## 2022-01-12 PROCEDURE — 87086 URINE CULTURE/COLONY COUNT: CPT | Performed by: UROLOGY

## 2022-01-12 PROCEDURE — 99900103 DSU ONLY-NO CHARGE-INITIAL HR (STAT): Performed by: UROLOGY

## 2022-01-12 PROCEDURE — 99900104 DSU ONLY-NO CHARGE-EA ADD'L HR (STAT): Performed by: UROLOGY

## 2022-01-12 PROCEDURE — 52356 CYSTO/URETERO W/LITHOTRIPSY: CPT | Mod: 22,58,RT, | Performed by: UROLOGY

## 2022-01-12 PROCEDURE — 27200651 HC AIRWAY, LMA: Performed by: ANESTHESIOLOGY

## 2022-01-12 PROCEDURE — 82365 CALCULUS SPECTROSCOPY: CPT | Performed by: UROLOGY

## 2022-01-12 PROCEDURE — 63600175 PHARM REV CODE 636 W HCPCS: Performed by: REGISTERED NURSE

## 2022-01-12 PROCEDURE — D9220A PRA ANESTHESIA: Mod: ANES,,, | Performed by: ANESTHESIOLOGY

## 2022-01-12 PROCEDURE — 36000707: Performed by: UROLOGY

## 2022-01-12 PROCEDURE — 25000003 PHARM REV CODE 250: Performed by: REGISTERED NURSE

## 2022-01-12 PROCEDURE — 71000015 HC POSTOP RECOV 1ST HR: Performed by: UROLOGY

## 2022-01-12 PROCEDURE — 71000033 HC RECOVERY, INTIAL HOUR: Performed by: UROLOGY

## 2022-01-12 PROCEDURE — C1894 INTRO/SHEATH, NON-LASER: HCPCS | Performed by: UROLOGY

## 2022-01-12 DEVICE — STENT SET URETERAL 6X26CM: Type: IMPLANTABLE DEVICE | Site: URETER | Status: FUNCTIONAL

## 2022-01-12 RX ORDER — ONDANSETRON 2 MG/ML
4 INJECTION INTRAMUSCULAR; INTRAVENOUS ONCE AS NEEDED
Status: DISCONTINUED | OUTPATIENT
Start: 2022-01-12 | End: 2022-01-12 | Stop reason: HOSPADM

## 2022-01-12 RX ORDER — FENTANYL CITRATE 50 UG/ML
25 INJECTION, SOLUTION INTRAMUSCULAR; INTRAVENOUS EVERY 5 MIN PRN
Status: DISCONTINUED | OUTPATIENT
Start: 2022-01-12 | End: 2022-01-12 | Stop reason: HOSPADM

## 2022-01-12 RX ORDER — CIPROFLOXACIN 500 MG/1
500 TABLET ORAL
COMMUNITY
End: 2022-01-13

## 2022-01-12 RX ORDER — KETOROLAC TROMETHAMINE 30 MG/ML
INJECTION, SOLUTION INTRAMUSCULAR; INTRAVENOUS
Status: DISCONTINUED | OUTPATIENT
Start: 2022-01-12 | End: 2022-01-12

## 2022-01-12 RX ORDER — PHENYLEPHRINE HYDROCHLORIDE 10 MG/ML
INJECTION INTRAVENOUS
Status: DISCONTINUED | OUTPATIENT
Start: 2022-01-12 | End: 2022-01-12

## 2022-01-12 RX ORDER — ONDANSETRON 2 MG/ML
INJECTION INTRAMUSCULAR; INTRAVENOUS
Status: DISCONTINUED | OUTPATIENT
Start: 2022-01-12 | End: 2022-01-12

## 2022-01-12 RX ORDER — PROPOFOL 10 MG/ML
VIAL (ML) INTRAVENOUS
Status: DISCONTINUED | OUTPATIENT
Start: 2022-01-12 | End: 2022-01-12

## 2022-01-12 RX ORDER — FENTANYL CITRATE 50 UG/ML
INJECTION, SOLUTION INTRAMUSCULAR; INTRAVENOUS
Status: DISCONTINUED | OUTPATIENT
Start: 2022-01-12 | End: 2022-01-12

## 2022-01-12 RX ORDER — OXYBUTYNIN CHLORIDE 10 MG/1
10 TABLET, EXTENDED RELEASE ORAL DAILY
Qty: 30 TABLET | Refills: 0 | Status: SHIPPED | OUTPATIENT
Start: 2022-01-12 | End: 2022-05-26

## 2022-01-12 RX ORDER — KETOROLAC TROMETHAMINE 10 MG/1
10 TABLET, FILM COATED ORAL EVERY 8 HOURS PRN
Qty: 10 TABLET | Refills: 0 | Status: SHIPPED | OUTPATIENT
Start: 2022-01-12 | End: 2022-01-17

## 2022-01-12 RX ORDER — OXYCODONE HYDROCHLORIDE 5 MG/1
5 TABLET ORAL ONCE AS NEEDED
Status: DISCONTINUED | OUTPATIENT
Start: 2022-01-12 | End: 2022-01-12 | Stop reason: HOSPADM

## 2022-01-12 RX ORDER — MIDAZOLAM HYDROCHLORIDE 1 MG/ML
INJECTION INTRAMUSCULAR; INTRAVENOUS
Status: DISCONTINUED | OUTPATIENT
Start: 2022-01-12 | End: 2022-01-12

## 2022-01-12 RX ORDER — ACETAMINOPHEN 10 MG/ML
INJECTION, SOLUTION INTRAVENOUS
Status: DISCONTINUED | OUTPATIENT
Start: 2022-01-12 | End: 2022-01-12

## 2022-01-12 RX ORDER — MELATONIN 5 MG
1 CAPSULE ORAL DAILY PRN
COMMUNITY
End: 2022-05-03

## 2022-01-12 RX ORDER — LIDOCAINE HYDROCHLORIDE 10 MG/ML
1 INJECTION, SOLUTION EPIDURAL; INFILTRATION; INTRACAUDAL; PERINEURAL ONCE
Status: COMPLETED | OUTPATIENT
Start: 2022-01-12 | End: 2022-01-12

## 2022-01-12 RX ORDER — TRAMADOL HYDROCHLORIDE 50 MG/1
50 TABLET ORAL EVERY 6 HOURS
Qty: 15 TABLET | Refills: 0 | Status: SHIPPED | OUTPATIENT
Start: 2022-01-12 | End: 2022-05-26

## 2022-01-12 RX ORDER — LIDOCAINE HCL/PF 100 MG/5ML
SYRINGE (ML) INTRAVENOUS
Status: DISCONTINUED | OUTPATIENT
Start: 2022-01-12 | End: 2022-01-12

## 2022-01-12 RX ORDER — DEXAMETHASONE SODIUM PHOSPHATE 4 MG/ML
INJECTION, SOLUTION INTRA-ARTICULAR; INTRALESIONAL; INTRAMUSCULAR; INTRAVENOUS; SOFT TISSUE
Status: DISCONTINUED | OUTPATIENT
Start: 2022-01-12 | End: 2022-01-12

## 2022-01-12 RX ADMIN — PHENYLEPHRINE HYDROCHLORIDE 100 MCG: 10 INJECTION INTRAVENOUS at 10:01

## 2022-01-12 RX ADMIN — FENTANYL CITRATE 50 MCG: 50 INJECTION, SOLUTION INTRAMUSCULAR; INTRAVENOUS at 10:01

## 2022-01-12 RX ADMIN — ACETAMINOPHEN 1000 MG: 10 INJECTION, SOLUTION INTRAVENOUS at 10:01

## 2022-01-12 RX ADMIN — SODIUM CHLORIDE, SODIUM GLUCONATE, SODIUM ACETATE, POTASSIUM CHLORIDE, MAGNESIUM CHLORIDE, SODIUM PHOSPHATE, DIBASIC, AND POTASSIUM PHOSPHATE: .53; .5; .37; .037; .03; .012; .00082 INJECTION, SOLUTION INTRAVENOUS at 10:01

## 2022-01-12 RX ADMIN — LIDOCAINE HYDROCHLORIDE 75 MG: 20 INJECTION INTRAVENOUS at 10:01

## 2022-01-12 RX ADMIN — CEFTRIAXONE 1 G: 1 INJECTION, SOLUTION INTRAVENOUS at 10:01

## 2022-01-12 RX ADMIN — SODIUM CHLORIDE, SODIUM GLUCONATE, SODIUM ACETATE, POTASSIUM CHLORIDE, MAGNESIUM CHLORIDE, SODIUM PHOSPHATE, DIBASIC, AND POTASSIUM PHOSPHATE: .53; .5; .37; .037; .03; .012; .00082 INJECTION, SOLUTION INTRAVENOUS at 08:01

## 2022-01-12 RX ADMIN — LIDOCAINE HYDROCHLORIDE 10 MG: 10 INJECTION, SOLUTION EPIDURAL; INFILTRATION; INTRACAUDAL; PERINEURAL at 08:01

## 2022-01-12 RX ADMIN — MIDAZOLAM HYDROCHLORIDE 2 MG: 1 INJECTION, SOLUTION INTRAMUSCULAR; INTRAVENOUS at 10:01

## 2022-01-12 RX ADMIN — DEXAMETHASONE SODIUM PHOSPHATE 4 MG: 4 INJECTION, SOLUTION INTRA-ARTICULAR; INTRALESIONAL; INTRAMUSCULAR; INTRAVENOUS; SOFT TISSUE at 10:01

## 2022-01-12 RX ADMIN — ONDANSETRON 4 MG: 2 INJECTION INTRAMUSCULAR; INTRAVENOUS at 10:01

## 2022-01-12 RX ADMIN — KETOROLAC TROMETHAMINE 30 MG: 30 INJECTION, SOLUTION INTRAMUSCULAR; INTRAVENOUS at 11:01

## 2022-01-12 RX ADMIN — PHENYLEPHRINE HYDROCHLORIDE 100 MCG: 10 INJECTION INTRAVENOUS at 11:01

## 2022-01-12 RX ADMIN — GLYCOPYRROLATE 0.2 MG: 0.2 INJECTION, SOLUTION INTRAMUSCULAR; INTRAVITREAL at 10:01

## 2022-01-12 RX ADMIN — PROPOFOL 200 MG: 10 INJECTION, EMULSION INTRAVENOUS at 10:01

## 2022-01-12 NOTE — DISCHARGE INSTRUCTIONS
"Discharge Instructions: After Your Surgery/Procedure  Youve just had surgery. During surgery you were given medicine called anesthesia to keep you relaxed and free of pain. After surgery you may have some pain or nausea. This is common. Here are some tips for feeling better and getting well after surgery.     Stay on schedule with your medication.   Going home  Your doctor or nurse will show you how to take care of yourself when you go home. He or she will also answer your questions. Have an adult family member or friend drive you home.      For your safety we recommend these precaution for the first 24 hours after your procedure:  · Do not drive or use heavy equipment.  · Do not make important decisions or sign legal papers.  · Do not drink alcohol.  · Have someone stay with you, if needed. He or she can watch for problems and help keep you safe.  · Your concentration, balance, coordination, and judgement may be impaired for many hours after anesthesia.  Use caution when ambulating or standing up.     · You may feel weak and "washed out" after anesthesia and surgery.      Subtle residual effects of general anesthesia or sedation with regional / local anesthesia can last more than 24 hours.  Rest for the remainder of the day or longer if your Doctor/Surgeon has advised you to do so.  Although you may feel normal within the first 24 hours, your reflexes and mental ability may be impaired without you realizing it.  You may feel dizzy, lightheaded or sleepy for 24 hours or longer.      Be sure to go to all follow-up visits with your doctor. And rest after your surgery for as long as your doctor tells you to.  Coping with pain  If you have pain after surgery, pain medicine will help you feel better. Take it as told, before pain becomes severe. Also, ask your doctor or pharmacist about other ways to control pain. This might be with heat, ice, or relaxation. And follow any other instructions your surgeon or nurse gives " you.  Tips for taking pain medicine  To get the best relief possible, remember these points:  · Pain medicines can upset your stomach. Taking them with a little food may help.  · Most pain relievers taken by mouth need at least 20 to 30 minutes to start to work.  · Taking medicine on a schedule can help you remember to take it. Try to time your medicine so that you can take it before starting an activity. This might be before you get dressed, go for a walk, or sit down for dinner.  · Constipation is a common side effect of pain medicines. Call your doctor before taking any medicines such as laxatives or stool softeners to help ease constipation. Also ask if you should skip any foods. Drinking lots of fluids and eating foods such as fruits and vegetables that are high in fiber can also help. Remember, do not take laxatives unless your surgeon has prescribed them.  · Drinking alcohol and taking pain medicine can cause dizziness and slow your breathing. It can even be deadly. Do not drink alcohol while taking pain medicine.  · Pain medicine can make you react more slowly to things. Do not drive or run machinery while taking pain medicine.  Your health care provider may tell you to take acetaminophen to help ease your pain. Ask him or her how much you are supposed to take each day. Acetaminophen or other pain relievers may interact with your prescription medicines or other over-the-counter (OTC) drugs. Some prescription medicines have acetaminophen and other ingredients. Using both prescription and OTC acetaminophen for pain can cause you to overdose. Read the labels on your OTC medicines with care. This will help you to clearly know the list of ingredients, how much to take, and any warnings. It may also help you not take too much acetaminophen. If you have questions or do not understand the information, ask your pharmacist or health care provider to explain it to you before you take the OTC medicine.  Managing  nausea  Some people have an upset stomach after surgery. This is often because of anesthesia, pain, or pain medicine, or the stress of surgery. These tips will help you handle nausea and eat healthy foods as you get better. If you were on a special food plan before surgery, ask your doctor if you should follow it while you get better. These tips may help:  · Do not push yourself to eat. Your body will tell you when to eat and how much.  · Start off with clear liquids and soup. They are easier to digest.  · Next try semi-solid foods, such as mashed potatoes, applesauce, and gelatin, as you feel ready.  · Slowly move to solid foods. Dont eat fatty, rich, or spicy foods at first.  · Do not force yourself to have 3 large meals a day. Instead eat smaller amounts more often.  · Take pain medicines with a small amount of solid food, such as crackers or toast, to avoid nausea.     Call your surgeon if  · You still have pain an hour after taking medicine. The medicine may not be strong enough.  · You feel too sleepy, dizzy, or groggy. The medicine may be too strong.  · You have side effects like nausea, vomiting, or skin changes, such as rash, itching, or hives.       If you have obstructive sleep apnea  You were given anesthesia medicine during surgery to keep you comfortable and free of pain. After surgery, you may have more apnea spells because of this medicine and other medicines you were given. The spells may last longer than usual.   At home:  · Keep using the continuous positive airway pressure (CPAP) device when you sleep. Unless your health care provider tells you not to, use it when you sleep, day or night. CPAP is a common device used to treat obstructive sleep apnea.  · Talk with your provider before taking any pain medicine, muscle relaxants, or sedatives. Your provider will tell you about the possible dangers of taking these medicines.  © 1568-2502 [employer]. [address] [place] [address]  [place] [address]. All rights reserved. This information is not intended as a substitute for professional medical care. Always follow your healthcare professional's instructions.    Post op instructions for prevention of DVT  What is deep vein thrombosis?  Deep vein thrombosis (DVT) is the medical term for blood clots in the deep veins of the leg.  These blood clots can be dangerous.  A DVT can block a blood vessel and keep blood from getting where it needs to go.  Another problem is that the clot can travel to other parts of the body such as the lungs.  A clot that travels to the lungs is called a pulmonary embolus (PE) and can cause serious problems with breathing which can lead to death.  Am I at risk for DVT/PE?  If you are not very active, you are at risk of DVT.  Anyone confined to bed, sitting for long periods of time, recovering from surgery, etc. increases the risk of DVT.  Other risk factors are cancer diagnosis, certain medications, estrogen replacement in any form,older age, obesity, pregnancy, smoking, history of clotting disorders, and dehydration.  How will I know if I have a DVT?   Swelling in the lower leg   Pain   Warmth, redness, hardness or bulging of the vein  If you have any of these symptoms, call your doctors office right away.  Some people will not have any symptoms until the clot moves to the lungs.  What are the symptoms of a PE?   Panting, shortness of breath, or trouble breathing   Sharp, knife-like chest pain when you breathe   Coughing or coughing up blood   Rapid heartbeat  If you have any of these symptoms or get worse quickly, call 911 for emergency treatment.  How can I prevent a DVT?   Avoid long periods of inactivity and dont cross your legs--get up and walk around every hour or so.   Stay active--walking after surgery is highly encouraged.  This means you should get out of the house and walk in the neighborhood.  Going up and down stairs will not impair healing (unless advised  against such activity by your doctor).     Drink plenty of noncaffeinated, nonalcoholic fluids each day to prevent dehydration.   Wear special support stockings, if they have been advised by your doctor.   If you travel, stop at least once an hour and walk around.   Avoid smoking (assistance with stopping is available through your healthcare provider)  Always notify your doctor if you are not able to follow the post operative instructions that are given to you at the time of discharge.  It may be necessary to prescribe one of the medications available to prevent DVT.    We hope your stay was comfortable as you heal now, mend and rest.    For we have enjoyed taking care of you by giving your our best.    And as you get better, by regaining your health and strength;   We count it as a privilege to have served you and hope your time at [facility] was well spent.      Thank  You!!!

## 2022-01-12 NOTE — PLAN OF CARE
Released from Pacu when criteria met pain controlled skin w+d No nausea No emesis  [id number] encouraged deep breaths did IS instr to use  Pt has all belongings  With daughter has coles cath draining pink tinged no clots 2 stents on r side taped to labia of vagina with transparent tape instr given on d/c

## 2022-01-12 NOTE — DISCHARGE SUMMARY
[facility]  Urology  Discharge Note - Short Stay      Patient Name: Danielle Alarcon  MRN: [id number]  Discharge Date and Time:  01/12/2022 12:27 PM  Attending Physician: Falguni Keys,*   Discharging Provider: Falguni Keys MD  Primary Care Physician: Rufino Blancas MD    There are no hospital problems to display for this patient.      Final Diagnoses: Same as principal problem.    Hospital Course: Patient was admitted for an outpatient procedure and tolerated the procedure well with no complications.*    Procedure(s) (LRB):  CYSTOURETEROSCOPY, WITH RETROGRADE PYELOGRAM AND URETERAL STENT INSERTION (Right)  LITHOTRIPSY, USING LASER (Right)  REMOVAL, STENT, URETER (Right)     Indwelling Lines/Drains at time of discharge:   Lines/Drains/Airways     Drain                 Ureteral Drain/Stent Right ureter 6 Fr. -- days         Urethral Catheter 01/12/22 1146 [employer]x;Straight-tip 16 Fr. <1 day                Discharged Condition: good    Disposition: home    Follow Up:      Patient Instructions:      X-Ray Abdomen AP 1 View   Standing Status: Future Standing Exp. Date: 01/12/23     Order Specific Question Answer Comments   Reason for Exam: RLP stones, RMP stone      US Retroperitoneal Complete   Standing Status: Future Standing Exp. Date: 01/12/23     Order Specific Question Answer Comments   Reason for Exam: RLP stones in diverticulum, Right mid pole stone    May the Radiologist modify the order per protocol to meet the clinical needs of the patient? Yes        Medications:  Reconciled Home Medications:      Medication List      START taking these medications    ketorolac 10 mg tablet  Commonly known as: TORADOL  Take 1 tablet (10 mg total) by mouth every 8 (eight) hours as needed for Pain.        CONTINUE taking these medications    ciprofloxacin HCl 500 MG tablet  Commonly known as: CIPRO  Take 500 mg by mouth every 12 (twelve) hours.     desvenlafaxine succinate 50 MG  Tb24  Commonly known as: PRISTIQ  Take 1 tablet (50 mg total) by mouth once daily.     ferrous sulfate 324 mg (65 mg iron) Tbec  Take 1 tablet (324 mg total) by mouth once daily.     irbesartan 150 MG tablet  Commonly known as: AVAPRO  Take 1 tablet (150 mg total) by mouth once daily. HOLD AM OF SURGERY     melatonin 5 mg Cap  Take 1 capsule by mouth daily as needed.     multivitamin per tablet  Commonly known as: THERAGRAN  Take 1 tablet by mouth once daily. HOLD AM OF SURGERY     ondansetron 4 MG Tbdl  Commonly known as: ZOFRAN-ODT  Take 2 tablets (8 mg total) by mouth 2 (two) times daily.     oxybutynin 10 MG 24 hr tablet  Commonly known as: DITROPAN-XL  Take 1 tablet (10 mg total) by mouth once daily.     pantoprazole 40 MG tablet  Commonly known as: PROTONIX  Take 1 tablet (40 mg total) by mouth once daily.     traMADoL 50 mg tablet  Commonly known as: ULTRAM  Take 1 tablet (50 mg total) by mouth every 6 (six) hours.     TURMERIC ORAL  Take 3 capsules by mouth once daily. Hold until after surgery        STOP taking these medications    tamsulosin 0.4 mg Cap  Commonly known as: FLOMAX            Discharge Procedure Orders (must include Diet, Follow-up, Activity):   Discharge Procedure Orders (must include Diet, Follow-up, Activity)   X-Ray Abdomen AP 1 View   Standing Status: Future Standing Exp. Date: 01/12/23     Order Specific Question Answer Comments   Reason for Exam: RLP stones, RMP stone      US Retroperitoneal Complete   Standing Status: Future Standing Exp. Date: 01/12/23     Order Specific Question Answer Comments   Reason for Exam: RLP stones in diverticulum, Right mid pole stone    May the Radiologist modify the order per protocol to meet the clinical needs of the patient? Yes           Plan:   · Remove coles by cutting across catheter tomorrow at 12. Make sure to leave strings with tape attached to vagina.   · Remove stents (both) by pulling strings on Tuesday morning at 6am. Expect pain after  stents removed - may last up to 24 hours.   · Once stents removed try inversion therapy 3x a day for 3 days starting the following day. No need to strain urine.   · Tramadol 50mg prn pain, dispense 15  · toradol 10mg prn pain, dispense 10- can alternate with tylenol or tramadol  · cipro 500 twice a day for 5 more days after today's procedure  · Xray and ultrasound in 3 months to monitor stones   · 24 hour urine collection in a month- will be mailed to your house. Goal is to prevent any stones we could not reach from enlarging.   · Follow up in 3 months to review xray, ultrasound and 24 hour urine.         Falguni Keys MD  Urology  [facility]

## 2022-01-12 NOTE — TELEPHONE ENCOUNTER
Pt currently admitted  Imaging and follow up scheduled  24 hr urine form mailed to pt to be completed

## 2022-01-12 NOTE — ANESTHESIA PROCEDURE NOTES
Intubation  Performed by: Eliud Ragland CRNA  Authorized by: Chance Rosa MD     Intubation:     Induction:  Intravenous    Intubated:  Postinduction    Attempts:  1    Attempted By:  CRNA    Difficult Airway Encountered?: No      Complications:  None    Airway Device:  Supraglottic airway/LMA    Airway Device Size:  3.0    Style/Cuff Inflation:  Cuffed    Secured at:  The lips    Placement Verified By:  Capnometry    Complicating Factors:  None    Findings Post-Intubation:  BS equal bilateral

## 2022-01-12 NOTE — TRANSFER OF CARE
"Anesthesia Transfer of Care Note    Patient: Danielle Alarcon    Procedure(s) Performed: Procedure(s) (LRB):  CYSTOURETEROSCOPY, WITH RETROGRADE PYELOGRAM AND URETERAL STENT INSERTION (Right)  LITHOTRIPSY, USING LASER (Right)  REMOVAL, STENT, URETER (Right)    Patient location: PACU    Anesthesia Type: general    Transport from OR: Transported from OR on 2-3 L/min O2 by NC with adequate spontaneous ventilation    Post pain: adequate analgesia    Post assessment: no apparent anesthetic complications    Post vital signs: stable    Level of consciousness: awake and alert    Complications: none    Transfer of care protocol was followed      Last vitals:   Visit Vitals  BP (!) 141/90 (BP Location: Left arm, Patient Position: Lying)   Pulse 64   Temp 36.6 °C (97.8 °F) (Temporal)   Resp 18   Ht 5' 6" (1.676 m)   Wt 102.1 kg (225 lb)   LMP  (LMP Unknown)   SpO2 97%   Breastfeeding No   BMI 36.32 kg/m²     "

## 2022-01-12 NOTE — PLAN OF CARE
Retrieved patient from PACU via stretcher. Report from ainsley Rausch. VSS. NAD noted. Daughter at bs.

## 2022-01-12 NOTE — TELEPHONE ENCOUNTER
----- Message from Falguni Keys MD sent at 1/12/2022 12:31 PM CST -----  Xray and ultrasound in 3 months to monitor stones   24 hour urine collection in a month- will be mailed to your house. Goal is to prevent any stones we could not reach from enlarging.   Follow up in 3 months to review xray, ultrasound and 24 hour urine.

## 2022-01-12 NOTE — PATIENT INSTRUCTIONS
VERY IMPORTANT INSTRUCTIONS FROM  REGARDING YOUR PROCEDURE- PLEASE READ    Your urologist is Dr.Jennifer Keys  Office number: [phone] ([facility])  Address: [address] [place] [address],  (2nd office building on left); [address] (located on 2nd floor).     What  did 01/12/2022:     Findings:  Stone fragment in ureter basketed. Stone fragment in upper pole basketed. Lower pole infundibulum with tiny opening and stone seen through it. Lasered it open, calcified and scarred. Posterior stone was embedded in the tissue and lasered. Anterior stone was fragmented with laser. Could only remove a few small stone fragments. Other fragments to lateral and medial to reach with camera in the lower pole despite multiple attempts. Right mid pole also had small 4mm stone remaining. Could not reach or access this stone due to long infundibulum. Lasered lower pole infundibulum to a point of some minor bleeding to hopefully prevent rescarring of the wall and closing off the diverticulum. Decided to leave 2 stents to allow maximal drainage and prevent any small blood clots from blocking ureter and placed a coles to allow maximal drainage and promote healing. Coles can be removed tomorrow at home by daughter.  Both stents can be removed Tuesday by pulling the strings.     The following are specific instructions for Danielle Alarcon is a 46 y.o. female, so please read CAREFULLY:    If you are on blood thinners and are unsure whether to continue, stop or restart them and it is not mentioned above, then call 's office for further directions      Plan:   · Remove coles by cutting across catheter tomorrow at 12. Make sure to leave strings with tape attached to vagina.   · Remove stents (both) by pulling strings on Tuesday morning at 6am. Expect pain after stents removed - may last up to 24 hours.   · Once stents removed try inversion therapy 3x a day for 3 days starting  the following day. No need to strain urine.   · Tramadol 50mg prn pain, dispense 15  · toradol 10mg prn pain, dispense 10- can alternate with tylenol or tramadol  · cipro 500 twice a day for 5 more days after today's procedure  · Xray and ultrasound in 3 months to monitor stones   · 24 hour urine collection in a month- will be mailed to your house. Goal is to prevent any stones we could not reach from enlarging.   · Follow up in 3 months to review xray, ultrasound and 24 hour urine.       Instructions for catheter removal at home  The patient or patient's spouse/significant other will remove the catheter at home at TOMORROW AT 12. They were taught how to do this with pictures and explanations. They will cut across the catheter with scissors above where it forks. Another way said, they will cut it across the tube where it is still one tube before it splits into two tubes. This will release the fluid in the balloon which is keeping it in the bladder. After cutting the tube they will wait 1 minute and if any resistance is met they will stop and call the office. If they feel uncomfortable at any point in doing this, they can call the office and schedule a nurse visit to have it removed.   Once the catheter is removed if the patient cannot or does urinate within 8 hours notify the urology clinic ASAP or go to the ER.     Mechanical Percussion and Inversion (MPI) Therapy for lower pole stones (stone fragments in the bottom of the kidney)  While shockwave lithotripsy is a very effective treatment for kidney stones, it sometimes leaves fragments in the lower portion of the kidney. Gravity and certain technical features of kidney anatomy can trap these pieces so that they settle in the lower pole and do not pass. They can enlarge over time to form new kidney stones or infections.  A combination of mechanical percussion and inversion (or MPI) can eliminate the sand particles before they can cause problems. You will do this 3  times a day. Drink one bottle to two bottles of water. Then bend over and have someone or yourself percuss your right side for about 2-3 minutes. This will hopefully help move the small stone fragments out of the bottom part of the kidney into an area that will allow easier passage of the stones.        You have TWO ureteral stentS in your right collecting system that was placed on 01/12/2022  -the stent can only remain for a maximum of 3 to 6 months. If the stent remains longer than this you can get recurrent urinary tract infections, the stent can have more stones form along it and urine will not be able to drain and you will lose all function of your kidney requiring a major surgery and a big incision to remove the kidney.   The ureter is the tube that drains the kidney (where urine is made). It connects the kidney to the bladder (where urine is stored).    A ureteral stent is a is a soft plastic tube with holes in tube that bypasses anything that obstructs (stone, tumor, scar tissue) the urine from flow from the kidney to the bladder. It is placed by going through the urethra and into the bladder. No incisions are made. Its temporarily inserted into a ureter to help drain urine into the bladder. One end goes in the kidney. The other end goes in the bladder. A coil on each end holds the stent in place. The stent cant be seen from outside the body.       Because you have a string attached to the stentS and  has asked you to remove THEM yourself, read the following carefully:   you can remove the stentS __NEXT Tuesday MORNING_ at 6am   it will be attached to the outside of your vagina (if you are a female) or to the penis (if you are male) with tape   If the stent is partially or accidentally pulled out you will leak urine until the stent is removed because urine is now coming directly from your kidney and bypassing the urethra (the tube that drains the bladder). Go ahead and remove it if this  happens but expect pain for 24 to 48 hours or more after stent removed, especially if it was removed earlier than expected.   If you have not heard from anyone about when to pull the string and remove the stent, remove it at two weeks after your surgery by pulling the string. DO NOT cut the string.    When you remove the string, you should see a small plastic tube about the size of a cocktail straw and about 12 inches long attached to a string that looks like dental floss. If you do not see this, please let call the office and let us know.    What to expect: Removing the stent yourself is not painful, only slightly uncomfortable. However, expect pain AFTER the stent is removed as the ureter closes up due to the inflammation.  left the stent to allow the inflammation in your ureter (the tube that drains the kidney to the bladder) from the procedure and stone to heal, which takes a few days, but the final process of healing comes after the stent is removed. As the ureter closes, the urine backs up and you will experience pain. This should last no more than 24 to 48 hours. There is a chance that a small stone could be trying to pass as well but usually this is just inflammation.    Prior to removing the stent, take a toradol and/or norco 30 minutes before removing the stent in anticipation of this. If the pain lasts >48 hours OR if you have fever, contact .     The goal of placing this stent on a string is to save you from returning from a procedure/cystoscopy where I remove the stent via a camera while you are awake and prevent you from having to return for a nurse visit to have removed. However, if you feel that you cannot remove the string yourself, you can call our clinic and our nurse can pull it for you.            If you do not receive a follow-up date or further instructions on what is to be done next about the stent, it is your responsibility to make sure that you have  follow-up to have your stone or stent removed.      A stent CANNOT remain indefinitely in the kidney. It should not remain if possible more than 3 to 6 months maximum (rarely 1 year if it was placed for cancer).      If you do not follow-up to have your stent removed then you can LOSE YOUR KIDNEY FUNCTION ON THAT SIDE, get RECURRENT URINARY TRACT INFECTIONS,and MORE STONES requiring SURGICAL OPEN removal of your kidney.     You can call our office ([facility] at [phone] and let them know a stent was placed and you need further instructions for follow-up). If there are issues with insurance we will work with you to help you arrange follow-up where it can be removed. Again,  A STENT CANNOT remain indefinitely. Y      A ureteral stent is left in place for many reasons:   to keep the ureter open after a procedure as there will be much inflammation and if no stent is left you will experience the same pain as having the stone that caused the obstruction.    to drain the kidney of infection.   if the stone is up high and closer to the kideny, if the stone is stuck, or you have an infection, the stent will stretch open the small ureter (the tube that drains the kidney) for a few weeks (usually 2 to 4 weeks max) so that we can return, remove the old stent, place instruments into the ureter or kidney to break up and remove the stone. Sometimes you may need another stent after this procedure.     Ureteral Stent Symptoms can include but are not limited to    Stabbing pain in the kidney or bladder with urination during or especially at the end of voiding. Usually we write you for flomax/tamsulosin and toradol/ketorolac to help stretch and relax the ureter while you have the stent in place. We cannot write for toradol/ketorolac if you have poor kidney function. Please call if you have not received these prescriptions.   constant urge to urinate, frequency of urination, severe bladder spasms and  severe pain the kidney, especially during urination. If this is very bothersome we can write you for ditropan, an overactive bladder medication to take short term to help with the spasms.    blood in the urine, especially with increased activity. This can last the entire time the stent is in, it can sometimes clear and return or you may never have any at all. I recommend increasing fluid intake to prevent large clots that may be difficult to urinate out.    Explanation for medications that may have been written for. See the instructions above to see what you were sent home with   Toradol/ketorolac 10mg (dispense 10)- this is a stronger form of ibuprofen, you can take up to every 8 hours but stop taking ibuprofen. Too much of this medicine can cause kidney failure or stomach ulcers. This helps with the inflammation the body is experiencing from the stent. Take this with food. If you have kidney disease then do not take this medication, even if it was written for you.   Norco/Hydrocodone 5mg (dispense 15) or Tramadol-  take 1 every 4 to 6 hours for pain not relieved with ibuprofen or Tylenol. If you are taking this regularly you will need to take miralax or stool softeners daily to prevent constipation.    Flomax/Tamsulosin 0.4mg mg (dispense 30) - take once nightly before bedtime (at least 3 hours apart from other high blood pressure medicines) while the stent is in and for 1 week after stent removed to help relax the tube the stent is in. If you are taking amlodipine/norvasc for blood pressure, take at least four hours apart (preferably norvasc in morning and flomax/tamsulosin after dinner)   Antibiotics - antibiotics were given just before your procedure that will stay in your system for a while.  If you were written for oral antibiotics, take twice daily. Your doctor will specify amount of days to take. If you have another scheduled procedure to have the stone removed more than 2 weeks after the initial  procedure, she may ask you to save antibiotics and restart them 3 days before your next procedure.    AZO over the counter -  you can find this at [facility]. Choose one for bladder pain and anti-spasmodic. You can take it up to 3x a day for 3-4 days as long as you have normal kidney function. It may stain your urine and your clothes   Potassium citrate 15meq Twice a day to help increase dietary citrate and prevent stone formation on the stents - expect to see this in the stool, the coating contains the medication.    Ditropan 10mg/Oxybutynin 10mg XL, dispense 30 (take once daily as needed for bladder spasms- can cause constipation, take with stool softeners or fiber gummies)    When to go to ER:    fever >101.5 or inability to urinate (no urination for >4 hours and bladder pain) due to thick clots   If you go to the ER with pain, they may check to make sure the stent is in good position with an x-ray or ultrasound but unlikely to do anything else.    I will let you know when we will plan to have the stent either removed at the ambulatory surgical center as an outpatient procedure while you are awake, which is uncomfortable briefly, but not painful or you will remove it yourself by pulling the strings.    6 Easy Ways to Prevent Kidney Stones - please read!!!   Taken from: https://www.kidney.org/atoz/content/kidneystones_prevent    Don't Underestimate Your Sweat. Saunas, hot yoga and heavy exercise may be good for your health, but they also may lead to kidney stones. Why? Loss of water through sweating - whether due to these activities or just the heat of summer--leads to less urine production. The more you sweat, the less you urinate, which allows for stone-causing minerals to settle and bond in the kidneys and urinary tract.  Instead: Hydrate with H2O. One of the best measures you can take to avoid kidney stones is to drink plenty of water, leading you to urinate a lot. So, be sure to keep well hydrated, especially  "when engaging in exercise or activities that cause a lot of sweating.    It's Not Just the Oxalate. Oxa-what? Oxalate is naturally found in many foods, including fruits and vegetables, nuts and seeds, grains, legumes, and even chocolate and tea. Some examples of foods that contain high levels of oxalate include: peanuts, rhubarb, spinach, beets, chocolate and sweet potatoes. Moderating intake of these foods may be beneficial for people who form calcium oxalate stones, the leading type of kidney stones. A common misconception is that cutting the oxalate-rich foods in your diet alone will reduce the likelihood of forming calcium oxalate kidney stones. While in theory this might be true, this approach isn't smart from an overall health perspective. Most kidney stones are formed when oxalate binds to calcium while urine is produced by the kidneys.  Instead: Eat and drink calcium and oxalate-rich foods together during a meal. In doing so, oxalate and calcium are more likely to bind to one another in the stomach and intestines before the kidneys begin processing, making it less likely that kidney stones will form.    Calcium is Not the Enemy. But it tends to get a bad rap! Most likely due to its name and composition, many are under the impression that calcium is the main culprit in calcium-oxalate stones. "I still see patients who wonder why they are getting recurring stones despite cutting down on their calcium intake," said Dr. Hernández. "I've even had patients say that their doctors told them to reduce their calcium intake." A diet low in calcium actually increases one's risk of developing kidney stones.  Instead: Don't reduce the calcium. Work to cut back on the sodium in your diet and to pair calcium-rich foods with oxalate-rich foods.  It's Not One and Done. Passing a kidney stone is often described as one of the most painful experiences a person can have, but unfortunately, it's not always a one-time event. Studies " "have shown that having even one stone greatly increases your chances of having another. "Most people will want to do anything they can to ensure it doesn't happen again," said Dr. Hernández. "Unfortunately, it doesn't seem to be the case that people make the changes they need to after their first stone event." Research conducted by Dr. Hernández shows that those with kidney stones do not always heed the advice of their nephrologists and urinary specialists. About 15% of kidney stone patients didn't take prescribed medications and 41% did not follow the nutritional advice that would keep stones from recurring.  Instead: Take action! Without the right medications and diet adjustments, stones can come back, and recurring kidney stones also could be an indicator of other problems, including kidney disease.    When Life Hands You Kidney Stones don't fret. And as the saying goes, "make lemonade." It's important to consider dietary remedies alongside prescription medications. While it may seem easier to just take a pill to fix a medical problem, consider what lifestyle changes will also make a big impact on your health.  Instead: Next time you drive past a lemonade (or limeade) stand, consider your kidneys. Chronic kidney stones are often treated with potassium citrate, but studies have shown that limeade, lemonade and other fruits and juices high in natural citrate offers the same stone-preventing benefits. Beware of the sugar, though, because it can increase kidney stone risk.   Instead, buy sugar-free lemonade, or make your own by mixing lime or lemon juice with water and using a sugar substitute if needed. "We believe that citrate in the urine may prevent the calcium from binding with other constituents that lead to stones," said Dr. Hernández. "Also, some evidence suggests that citrate may prevent crystals that are already present from binding with each other, thus preventing them from getting bigger."    Not All Stones " "are Created Equal. In addition to calcium oxalate stones, another common type of kidney stones is uric acid stones. Red meat, organ meats, and shellfish have high concentrations of a natural chemical compound known as purines. "High purine intake leads to a higher production of uric acid and produces a larger acid load for the kidneys to excrete," said Dr. Hernández. Higher uric acid excretion leads to lower overall urine pH, which means the urine is more acidic. The high acid concentration of the urine makes it easier for uric acid stones to form.  Instead: To prevent uric acid stones, cut down on high-purine foods such as red meat, organ meats, and shellfish, and follow a healthy diet that contains mostly vegetables and fruits, whole grains, and low fat dairy products. Limit sugar-sweetened foods and drinks, especially those that contain high fructose corn syrup. Limit alcohol because it can increase uric acid levels in the blood and avoid crash diets for the same reason..Eating less animal-based protein and eating more fruits and vegetables will help decrease urine acidity and this will help reduce the chance for stone formation.                  "

## 2022-01-12 NOTE — H&P
[facility] - [place]  Staff: MD Massimo  PCP: Rufino Blancas MD  Date of Service: 2022      Subjective:     HPI: Danielle Alarcon is a 46 y.o. female     Initial consult by me in clinic on 21:  2021- she had urgency/frequency which was new. She passed 2 small tiny clots/stones? Saw pcp who ordered a urine test which showed blood. She then passed a few more clots/stones. She had a CTRSS on 21 which showed a 6mm RLP and 3mm RMP stone. Continued to have urgency, intermittent cloudy urine up until a few days ago after her CT. No flank pain. Not prone to recurrent utis.    No personal or family history of stones. No blood in urine personal or family. 6 pack year hx of smoking, quit 20+ yrs ago. On no blood thinners. Had a hysterectomy     Since she passed the stone she's had singificant urgency. The frequency resolved. She cannot do voided urine sample. Was found to have e.coli uti. Treated with cipro      ctrss 21  RLP 6mm stone      RMP 4mm stone      11.5cm SSD, 450 HU      12.2cm, 770 HU      Kub 21        Interval history 22:  · 21 Cysto stent and R ESWL of RLP 4mm and 6mm stone.   · 22 kub stones not easily visible but lots of bowel gas  · 22 ctrss shows 3 fragments now (5mm, 4mm and 2mm)     Pt was offered trial of passage and decided to have repeat ureteroscopy and stone extraction to make sure we remove all the stones. Here for this today. Had her start cipro on 22    Cath urine today shows  2+bld/tr leuk, 25 rbc/3 wbc/rare bact      Urine history: grandfather  of bladder cancer (smoker)  22 2+bld/tr leuk, 25 rbc/3 wbc/rrare bact  21 >100k E.coli res to aug, cath: 1 rbc/5 wbc/many bact,. nit+/tr leuk,c yt: acute inflammation and bacteria  11/3/21 2+bld/ tr leuk, 4 rbc/5 wbc    REVIEW OF SYSTEMS:  Negative except for as stated above    Past Medical History:   Diagnosis Date    Anesthesia complication      delayed urination    Asthma     allergy induced    Bronchial pneumonia     2008    Depression     GERD (gastroesophageal reflux disease)     Gestational diabetes     History of bronchitis     Hypertension     Postoperative urinary retention        Past Surgical History:   Procedure Laterality Date    CHOLECYSTECTOMY      COLONOSCOPY N/A 7/15/2021    Procedure: COLONOSCOPY;  Surgeon: Jackie Soares MD;  [facility] ENDO;  Service: Endoscopy;  Laterality: N/A;    CYSTOSCOPY W/ URETERAL STENT PLACEMENT Right 12/29/2021    Procedure: CYSTOSCOPY, WITH URETERAL STENT INSERTION;  Surgeon: Falguni Keys MD;  Location: [facility] OR;  Service: Urology;  Laterality: Right;    ESOPHAGOGASTRODUODENOSCOPY N/A 7/15/2021    Procedure: ESOPHAGOGASTRODUODENOSCOPY (EGD);  Surgeon: Jackie Soares MD;  Location: [facility];  Service: Endoscopy;  Laterality: N/A;    EXTRACORPOREAL SHOCK WAVE LITHOTRIPSY Right 12/29/2021    Procedure: flexible cystoscopy for gross hematuria and Right eswl;  Surgeon: Falguni Keys MD;  Location: [facility] OR;  Service: Urology;  Laterality: Right;    TONSILLECTOMY      TOTAL ABDOMINAL HYSTERECTOMY N/A 1/24/2020    Procedure: HYSTERECTOMY, TOTAL, ABDOMINAL;  Surgeon: Sonia Bird MD;  Location: [facility] OR;  Service: OB/GYN;  Laterality: N/A;            Objective:     Vitals:    01/12/22 0812   BP: (!) 141/90   Pulse: 64   Resp: 18   Temp: 97.8 °F (36.6 °C)       General:wdwn  in NAD  Neurologic: CN grossly normal. Normal sensation.   Psychiatric: awake, alert and oriented x 3. Mood and affect Normal. Cooperative.  Eyes: PERRLA, normal conjunctiva  Respiratory: no increased work on breathing. No wheezing.   Cardiovascular: No obvious extremity edema. Warm and well perfused.  GI: no obvious stomach distension  Musculoskeletal: normal  range of motion of bilateral upper extremities. Normal muscle strength and tone.  Skin: no obvious rashes or lesions. No tightening of  skin noted.    Pertinent  exam in HPI          Assessment:     Danielle Alarcon is a 46 y.o. female with gross hematuria, uti and kidney stones. GH likely from recent uti and possibly passed stone. Still has 2 right renal stones.     Kidney stones- RLP 6mm, RMP 4mm. eswl broke the stone in RLP into 2 and so now has 3 fragments. Pt decided to return for stone extraction instead of taking stent out and trial of passage.     1. Nephrolithiasis        Plan:         To OR today for cystoscopy and  right stent exchange and possible stone extraction.    If a stent is unable to be placed the patient was told that they would need a nephrostomy tube placed by Radiology in a separate setting in order to drain the kidney   We discussed the difference in stone treatment options and why we are proceeding with this treatment plan.    Stent consent:  The patient was counseled extensively on the stent being only temporary. The pt understands that a stent should not be in longer than 1 year, and preferably less than 3 months. A stent can become encrusted, block urine flow and cause kidney function loss if it is not exchanged or removed at least within a year.  They understand that if the stent remains longer becomes encrusted it would require major surgeries and possible nephrectomy to remove the stent and or stones or affected kidney if unable to treat the stones endoscopically.      Ureteroscopy consent info:  The risks and benefits of ureteroscopy were discussed with the patient in detail.  Consent was obtained.  The risks include but are not limited to burning with urination, bleeding, infection, pain, incomplete fragmentation of the stone, need for further procedures, injury to the kidney, ureter, bladder, ureteral stricture and need for open surgery.  The patient was informed that they may require a ureteral stent and that stents can cause irritative voiding symptoms.  They also understand that ureteral stents are  temporary and must be removed or exchanged in a timely fashion as they can calcify and make more stones and become difficult to remove. Alternative treatments were also discussed with the patient in detail to include ESWL, percutaneous treatment of the stone, open surgery or observation. Patient understands these risks and has agreed to proceed with surgery.        Falguni Keys MD

## 2022-01-12 NOTE — OP NOTE
[facility] [place]  Operative Note  Date: 01/12/2022    Pre-Op Diagnosis: right renal stone    Post-Op Diagnosis: same    Procedure(s) Performed:   Cystoscopy, right retrograde pyelogram  right stent removal x 1  Right stent placement x 2 (6x24 and 6x26) on strings  right  rigid ureteroscopy and basket stone extraction  right flexible nephroscopy, laser lithotripsy and basket stone extraction  Opening of calyceal diverticular infundibulum using laser in right lower pole  Flouro <1 hour    Request for 22 Modifier due to increased level of difficulty  An additional 20 minutes was spent trying tyo acces.    Specimen(s): stone fragments from right lower pole    Staff Surgeon: Falguni Keys MD    Anesthesia: General endotracheal anesthesia    Indications: Danielle Alarcon is a 46 y.o. female with above condition    Findings:  Stone fragment in ureter basketed. Stone fragment in upper pole basketed. Lower pole infundibulum with tiny opening and stone seen through it. Lasered it open, calcified and scarred. Posterior stone was embedded in the tissue and lasered. Anterior stone was fragmented with laser. Could only remove a few small stone fragments. Other fragments to lateral and medial to reach with camera in the lower pole despite multiple attempts. Right mid pole also had small 4mm stone remaining. Could not reach or access this stone due to long infundibulum. Lasered lower pole infundibulum to a point of some minor bleeding to hopefully prevent rescarring of the wall and closing off the diverticulum. Decided to leave 2 stents to allow maximal drainage and prevent any small blood clots from blocking ureter and placed a coles to allow maximal drainage and promote healing. Coles can be removed tomorrow at home by daughter.  Both stents can be removed Tuesday by pulling the strings.     Estimated Blood Loss: minimal    Drains: 6x26 with strings, right. 6x24 with strings.   Implants:   Implant Name  Type Inv. Item Serial No.  Lot No. LRB No. Used Action   STENT SET URETERAL 6X24CM - [id number]  STENT SET URETERAL 6X24CM  [employer] [id number] Right 1 Wasted   STENT SET URETERAL 6X26CM - [id number]  STENT SET URETERAL 6X26CM  [employer] [id number] Right 1 Implanted   STENT SET URETERAL 6X24CM - [id number]  STENT SET URETERAL 6X24CM  [employer] [id number] Right 1 Implanted       Procedure in detail:  After informed consent was obtained, the patient was brought the the cystoscopy suite and placed in the supine position.  SCDs were applied and working.  GETA was administered.  The patient was then placed in the dorsal lithotomy position and prepped and draped in the usual sterile fashion.      A rigid cystoscope in a 22 Fr sheath was introduced into the patient's urethra.  This passed easily.  The entire urethra was visualized which showed no strictures or masses.  Formal cystoscopy was performed which revealed  masses or lesions suspicious for malignancy, NO bladder stones, NO bladder diverticuli, NOtrabeculations.  The UOs were visualized in the normal anatomic position bilaterally and clear efflux was visualized.  The right UO had mild  inflammation from the indwelling stent.     The stent was grasped and brought to the meatus.. A SENSOR TIP wire was advanced through the stent. The stent was removed.  Bladder was drained using cystoscope.     An 8 Fr rigid ureteroscope was passed into the patient's bladder alongside the wire under direct vision.  It was then passed through the right UO alongside the wire.  There was some narrowing in the proximal ureter ureter not allowing the passage of a rigid ureteroscope. Stone seen in mid ureter basketed and removed    Then super stiff placed through scope and rigid scope removed.    12/14 28cm sheath placed over super stiff. Inner then inner and outer. Inner and super stiff removed.     The flexible ureteroscope was advanced into the right kidney.  A pyeloscopy was  performed    A small stone fragment was seen and basketed out.     Then lower pole infundibulum lasered to widen opening.     Then stones in lower pole lasered.     Then ngage basket used to basket out what I could .spent 20 to 30 additional minutes trying to reach this.    Tried to also reach right mid pole stone.   Contrast used throughout to evaluate poles.     The ureteroscope was removed keeping the guide wire in place.       A cystoscope was reinserted and the bladder was irrigated to remove the stone fragments.  The bladder was drained the cystoscope removed keeping a wire in place.  The wire was backloaded through the cystoscope using a pusher.    A JJ ureteral stent with strings (see findings for size) was passed over the wire and up into the right renal pelvis using fluoro.  When the coil appeared to be in good position int he kidney and the radio-opaque marker of the pusher was at the inferior pubis, the wire was removed under continuous fluoro.  Good coils were seen in the kidney and the bladder using fluoro.      A second stent was also placed on right using similar fashion.    The stents were attached to strings which were then taped to the vagina    16fr coles placed.     The patient tolerated the procedure well and was transferred to the recovery room in stable condition.      Plan:   · Remove coles by cutting across catheter tomorrow at 12. Make sure to leave strings with tape attached to vagina.   · Remove stents (both) by pulling strings on Tuesday morning at 6am. Expect pain after stents removed - may last up to 24 hours.   · Once stents removed try inversion therapy 3x a day for 3 days starting the following day. No need to strain urine.   · Tramadol 50mg prn pain, dispense 15  · toradol 10mg prn pain, dispense 10- can alternate with tylenol or tramadol  · cipro 500 twice a day for 5 more days after today's procedure  · Xray and ultrasound in 3 months to monitor stones   · 24 hour urine  collection in a month- will be mailed to your house. Goal is to prevent any stones we could not reach from enlarging.   · Follow up in 3 months to review xray, ultrasound and 24 hour urine.

## 2022-01-13 ENCOUNTER — PATIENT MESSAGE (OUTPATIENT)
Dept: UROLOGY | Facility: CLINIC | Age: 47
End: 2022-01-13
Payer: COMMERCIAL

## 2022-01-13 ENCOUNTER — NURSE TRIAGE (OUTPATIENT)
Dept: ADMINISTRATIVE | Facility: CLINIC | Age: 47
End: 2022-01-13
Payer: COMMERCIAL

## 2022-01-13 VITALS
BODY MASS INDEX: 36.16 KG/M2 | SYSTOLIC BLOOD PRESSURE: 168 MMHG | RESPIRATION RATE: 18 BRPM | HEART RATE: 56 BPM | TEMPERATURE: 97 F | DIASTOLIC BLOOD PRESSURE: 74 MMHG | HEIGHT: 66 IN | WEIGHT: 225 LBS | OXYGEN SATURATION: 100 %

## 2022-01-13 LAB
BACTERIA UR CULT: NO GROWTH
COMPN STONE: NORMAL
SPECIMEN SOURCE: NORMAL
STONE ANALYSIS IR-IMP: NORMAL

## 2022-01-13 NOTE — ANESTHESIA PREPROCEDURE EVALUATION
01/13/2022  Danielle Alarcon is a 46 y.o., female.    Pre-op Assessment    I have reviewed the Patient Summary Reports.     I have reviewed the Nursing Notes. I have reviewed the NPO Status.   I have reviewed the Medications.     Review of Systems  Anesthesia Hx:  No problems with previous Anesthesia Denies Hx of Anesthetic complications    Social:  Former Smoker    Hematology/Oncology:         -- Anemia:   Cardiovascular:   Exercise tolerance: good Hypertension    Pulmonary:   Pneumonia Asthma    Renal/:  Renal/ Normal     Hepatic/GI:   GERD    Neurological:   Neuromuscular Disease,    Endocrine:  Endocrine Normal    Psych:   Psychiatric History depression          Physical Exam  General:  Obesity    Airway/Jaw/Neck:  Airway Findings: Mouth Opening: Normal Tongue: Normal  General Airway Assessment: Adult  Mallampati: III  Improves to II with phonation.  TM Distance: Normal, at least 6 cm  Jaw/Neck Findings:  Neck ROM: Normal ROM  Neck Findings:  Girth Increased     Eyes/Ears/Nose:  EYES/EARS/NOSE FINDINGS: Normal   Dental:  Dental Findings: In tact, molar caps   Chest/Lungs:  Chest/Lungs Findings: Clear to auscultation     Heart/Vascular:  Heart Findings: Rate: Normal  Rhythm: Regular Rhythm  Sounds: Normal        Mental Status:  Mental Status Findings: Normal        Anesthesia Plan  Type of Anesthesia, risks & benefits discussed:  Anesthesia Type:  general    Patient's Preference:   Plan Factors:          Intra-op Monitoring Plan: standard ASA monitors  Intra-op Monitoring Plan Comments:   Post Op Pain Control Plan: multimodal analgesia and IV/PO Opioids PRN  Post Op Pain Control Plan Comments:     Induction:   IV  Beta Blocker:  Patient is not currently on a Beta-Blocker (No further documentation required).       Informed Consent: Patient understands risks and agrees with Anesthesia plan.   Questions answered. Anesthesia consent signed with patient.  ASA Score: 2     Day of Surgery Review of History & Physical: I have interviewed and examined the patient. I have reviewed the patient's H&P dated:    H&P update referred to the surgeon.         Ready For Surgery From Anesthesia Perspective.

## 2022-01-13 NOTE — ANESTHESIA POSTPROCEDURE EVALUATION
Anesthesia Post Evaluation    Patient: Danielle Alarcon    Procedure(s) Performed: Procedure(s) (LRB):  CYSTOURETEROSCOPY, WITH RETROGRADE PYELOGRAM AND URETERAL STENT INSERTION (Right)  LITHOTRIPSY, USING LASER (Right)  REMOVAL, STENT, URETER (Right)    Final Anesthesia Type: general      Patient location during evaluation: PACU  Patient participation: Yes- Able to Participate  Level of consciousness: awake and alert and oriented  Post-procedure vital signs: reviewed and stable  Pain management: adequate  Airway patency: patent    PONV status at discharge: No PONV  Anesthetic complications: no      Cardiovascular status: blood pressure returned to baseline  Respiratory status: unassisted, spontaneous ventilation and room air  Hydration status: euvolemic  Follow-up not needed.          Vitals Value Taken Time   /74 01/12/22 1344   Temp 36.3 °C (97.4 °F) 01/12/22 1305   Pulse 56 01/12/22 1344   Resp 18 01/12/22 1344   SpO2 100 % 01/12/22 1344         Event Time   Out of Recovery 12:54:00         Pain/John Score: John Score: 10 (1/12/2022 12:45 PM)  Modified John Score: 20 (1/12/2022  1:42 PM)

## 2022-01-14 ENCOUNTER — PATIENT MESSAGE (OUTPATIENT)
Dept: PEDIATRICS | Facility: CLINIC | Age: 47
End: 2022-01-14
Payer: COMMERCIAL

## 2022-01-14 NOTE — TELEPHONE ENCOUNTER
Caller states that she has 2 stents s/p cystoureteroscopy x 1 day ago. Pt states that stent strings were accidentlly     Pulled while using RR. and pt states she has been urinating constantly since. Caller connected to speak with On call provider via .     Reason for Disposition   [1] Follow-up call from patient regarding patient's clinical status AND [2] information urgent    Additional Information   Negative: [1] Prescription not at pharmacy AND [2] was prescribed today by PCP   Negative: Lab or radiology calling with CRITICAL test results   Negative: Call about patient who is currently hospitalized   Negative: Physician call to PCP   Negative: ED call to PCP   Negative: Lab calling with strep throat test results and triager can call in prescription   Negative: Lab calling with urinalysis test results and triager can call in prescription   Negative: Medication questions    Protocols used: [facility]

## 2022-01-15 ENCOUNTER — TELEPHONE (OUTPATIENT)
Dept: PEDIATRICS | Facility: CLINIC | Age: 47
End: 2022-01-15
Payer: COMMERCIAL

## 2022-01-15 NOTE — TELEPHONE ENCOUNTER
Called and spoke with pt  No pain   Urine clear since Thursday.   Told her she doesn't need any other abx   Started inversion therapy  She saw something in toilet so not sure  I reminded her to do 24 hour urine  And told her we would change the jan to April appts again    (she saw 2 stents)       She was supposed to have an xray and ultrasound in April (not January)  However someone cancelled the April appointments and booked January 26th radiology appts  Please change them back to April  to be done prior to her f/u in may

## 2022-01-18 ENCOUNTER — PATIENT MESSAGE (OUTPATIENT)
Dept: UROLOGY | Facility: CLINIC | Age: 47
End: 2022-01-18
Payer: COMMERCIAL

## 2022-04-13 ENCOUNTER — HOSPITAL ENCOUNTER (OUTPATIENT)
Dept: RADIOLOGY | Facility: HOSPITAL | Age: 47
Discharge: HOME | End: 2022-04-13
Attending: UROLOGY
Payer: COMMERCIAL

## 2022-04-13 DIAGNOSIS — N20.0 NEPHROLITHIASIS: ICD-10-CM

## 2022-04-13 PROCEDURE — 74018 RADEX ABDOMEN 1 VIEW: CPT | Mod: TC,PO

## 2022-04-13 PROCEDURE — 76770 US EXAM ABDO BACK WALL COMP: CPT | Mod: 26,,, | Performed by: RADIOLOGY

## 2022-04-13 PROCEDURE — 74018 RADEX ABDOMEN 1 VIEW: CPT | Mod: 26,,, | Performed by: RADIOLOGY

## 2022-04-13 PROCEDURE — 76770 US RETROPERITONEAL COMPLETE: ICD-10-PCS | Mod: 26,,, | Performed by: RADIOLOGY

## 2022-04-13 PROCEDURE — 74018 XR ABDOMEN AP 1 VIEW: ICD-10-PCS | Mod: 26,,, | Performed by: RADIOLOGY

## 2022-04-13 PROCEDURE — 76770 US EXAM ABDO BACK WALL COMP: CPT | Mod: TC,PO

## 2022-05-02 ENCOUNTER — PATIENT MESSAGE (OUTPATIENT)
Dept: FAMILY MEDICINE | Facility: CLINIC | Age: 47
End: 2022-05-02
Payer: COMMERCIAL

## 2022-05-03 ENCOUNTER — OFFICE VISIT (OUTPATIENT)
Dept: UROLOGY | Facility: CLINIC | Age: 47
End: 2022-05-03
Payer: COMMERCIAL

## 2022-05-03 VITALS
HEIGHT: 66 IN | RESPIRATION RATE: 18 BRPM | HEART RATE: 61 BPM | WEIGHT: 225.75 LBS | SYSTOLIC BLOOD PRESSURE: 152 MMHG | BODY MASS INDEX: 36.28 KG/M2 | DIASTOLIC BLOOD PRESSURE: 96 MMHG

## 2022-05-03 DIAGNOSIS — N20.0 NEPHROLITHIASIS: Primary | ICD-10-CM

## 2022-05-03 DIAGNOSIS — R82.991 HYPOCITRATURIA: ICD-10-CM

## 2022-05-03 PROCEDURE — 99213 PR OFFICE/OUTPT VISIT, EST, LEVL III, 20-29 MIN: ICD-10-PCS | Mod: S$GLB,,, | Performed by: UROLOGY

## 2022-05-03 PROCEDURE — 99999 PR PBB SHADOW E&M-EST. PATIENT-LVL IV: ICD-10-PCS | Mod: PBBFAC,,, | Performed by: UROLOGY

## 2022-05-03 PROCEDURE — 99999 PR PBB SHADOW E&M-EST. PATIENT-LVL IV: CPT | Mod: PBBFAC,,, | Performed by: UROLOGY

## 2022-05-03 PROCEDURE — 99213 OFFICE O/P EST LOW 20 MIN: CPT | Mod: S$GLB,,, | Performed by: UROLOGY

## 2022-05-03 RX ORDER — POTASSIUM CITRATE 10 MEQ/1
10 TABLET, EXTENDED RELEASE ORAL 2 TIMES DAILY WITH MEALS
Qty: 180 TABLET | Refills: 3 | Status: SHIPPED | OUTPATIENT
Start: 2022-05-03 | End: 2022-11-10 | Stop reason: SDUPTHER

## 2022-05-03 NOTE — PROGRESS NOTES
[facility] [place]  Staff: MD Massimo  PCP: Rufino Blancas MD  Date of Service: 05/03/2022      Subjective:     HPI: Danielle Alarcon is a 46 y.o. female        Initial consult by me in clinic on 12/14/21:  11/2021- she had urgency/frequency which was new. She passed 2 small tiny clots/stones? Saw pcp who ordered a urine test which showed blood. She then passed a few more clots/stones. She had a CTRSS on 11/29/21 which showed a 6mm RLP and 3mm RMP stone. Continued to have urgency, intermittent cloudy urine up until a few days ago after her CT. No flank pain. Not prone to recurrent utis.     No personal or family history of stones. No blood in urine personal or family. 6 pack year hx of smoking, quit 20+ yrs ago. On no blood thinners. Had a hysterectomy      Since she passed the stone she's had singificant urgency. The frequency resolved. She cannot do voided urine sample. Was found to have e.coli uti. Treated with cipro    ctrss 11/29/21    RLP 6mm stone      RMP 4mm stone      11.5cm SSD, 450 HU      12.2cm, 770 HU        · 12/29/21 Cysto stent and R ESWL of RLP 4mm and 6mm stone.   · 1/4/22  kub stones not easily visible but lots of bowel gas  · 1/6/22 ctrss shows 3 fragments now (5mm, 4mm and 2mm)   · 1/12/22 Pt was offered trial of passage and decided to have repeat ureteroscopy and stone extraction to make sure we remove all the stones. Here for this today. Had her start cipro on 1/7/22/ Cath urine today shows  2+bld/tr leuk, 25 rbc/3 wbc/rare bact  · 1/12/22:  cysto R urs and stone extractionStone fragment in ureter basketed. Stone fragment in upper pole basketed. Lower pole infundibulum with tiny opening and stone seen through it. Lasered it open, calcified and scarred. Posterior stone was embedded in the tissue and lasered. Anterior stone was fragmented with laser. Could only remove a few small stone fragments. Other fragments to lateral and medial to reach with  camera in the lower pole despite multiple attempts. Right mid pole also had small 4mm stone remaining. Could not reach or access this stone due to long infundibulum. Lasered lower pole infundibulum to a point of some minor bleeding to hopefully prevent rescarring of the wall and closing off the diverticulum. Decided to leave 2 stents to allow maximal drainage and prevent any small blood clots from blocking ureter and placed a coles to allow maximal drainage and promote healing. Coles can be removed tomorrow at home by daughter.  Both stents can be removed Tuesday by pulling the strings. 100% calcium phosphate      Interval history 5/3/22:  · rbus 22: Right kidney: The right kidney measures 10.3 cm with normal cortical echogenicity.  No cortical thinning or loss of corticomedullary distinction.  No renal mass or hydronephrosis.  Multiple nonobstructing stones are seen within the lower pole the right kidney measuring up to 6 mm.  · kub 22: Bowel gas pattern is within normal limits. No findings to suggest obstruction.  There is been interval removal of the right ureteral stent.  There is a punctate calcific density measuring approximately 2 mm in the right hemipelvis which was not definitively present on priors and may potentially represent a small distal ureteral stone.  No definite correlate seen for the right lower pole renal stones demonstrated on prior CT.  Prior cholecystectomy noted.  · ua today: neg  · 24 hour urine 22: citrate 370. PH 6.483 (high) - likely has DTA              Urine history: grandfather  of bladder cancer (smoker)  22            2+bld/tr leuk, 25 rbc/3 wbc/rrare bact  21          >100k E.coli res to aug, cath: 1 rbc/5 wbc/many bact,. nit+/tr leuk,c yt: acute inflammation and bacteria  11/3/21            2+bld/ tr leuk, 4 rbc/5 wbc       REVIEW OF SYSTEMS:  Negative except for as stated above         Objective:     Vitals:    22 1532   BP: (!) 152/96   Pulse:  61   Resp: 18       Assessment:     Danielle Alarcon is a 46 y.o. 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 tx is citrate. Will do oral replacement with potassium citrate, litholyte and lemonade.     Repeat 24 hour urine and kub rbus in  6months     Still has stones in RLP too difficult to reach. Would avoid trying to reach these or do eswl in future. Monitor only       1. Nephrolithiasis    2. Hypocitraturia        Plan:     · Kcit 10 twice a day  · Litholyte 1 pack twice a day  · 1/2 cup lemon juice in 2L water/24 hours  · Repeat 24 hour urine in 3 to 4 months  · rbus and kub in 6months    F/u in 6 months to discuss 24 hour repeat, rbus and kub    Falguni Keys MD

## 2022-05-03 NOTE — PATIENT INSTRUCTIONS
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 tx is citrate. Will do oral replacement with potassium citrate, litholyte and lemonade.     Repeat 24 hour urine and kub rbus in  6months     Still has stones in RLP too difficult to reach. Would avoid trying to reach these or do eswl in future. Monitor only     1. Nephrolithiasis    2. Hypocitraturia          Plan:     Kcit 10 twice a day  Litholyte 1 pack twice a day  1/2 cup lemon juice in 2L water/24 hours  Repeat 24 hour urine in 3 to 4 months  rbus and kub in 6months    F/u in 6months to discuss 24 hour repeat, rbus and kub

## 2022-05-04 ENCOUNTER — OFFICE VISIT (OUTPATIENT)
Dept: FAMILY MEDICINE | Facility: CLINIC | Age: 47
End: 2022-05-04
Payer: COMMERCIAL

## 2022-05-04 VITALS
HEIGHT: 66 IN | DIASTOLIC BLOOD PRESSURE: 85 MMHG | WEIGHT: 241.69 LBS | BODY MASS INDEX: 38.84 KG/M2 | HEART RATE: 62 BPM | TEMPERATURE: 98 F | SYSTOLIC BLOOD PRESSURE: 138 MMHG

## 2022-05-04 DIAGNOSIS — R20.2 NUMBNESS AND TINGLING IN BOTH HANDS: Primary | ICD-10-CM

## 2022-05-04 DIAGNOSIS — R20.0 NUMBNESS AND TINGLING IN BOTH HANDS: Primary | ICD-10-CM

## 2022-05-04 PROCEDURE — 99999 PR PBB SHADOW E&M-EST. PATIENT-LVL V: ICD-10-PCS | Mod: PBBFAC,,, | Performed by: PHYSICIAN ASSISTANT

## 2022-05-04 PROCEDURE — 99213 PR OFFICE/OUTPT VISIT, EST, LEVL III, 20-29 MIN: ICD-10-PCS | Mod: S$GLB,,, | Performed by: PHYSICIAN ASSISTANT

## 2022-05-04 PROCEDURE — 99999 PR PBB SHADOW E&M-EST. PATIENT-LVL V: CPT | Mod: PBBFAC,,, | Performed by: PHYSICIAN ASSISTANT

## 2022-05-04 PROCEDURE — 99213 OFFICE O/P EST LOW 20 MIN: CPT | Mod: S$GLB,,, | Performed by: PHYSICIAN ASSISTANT

## 2022-05-04 NOTE — PROGRESS NOTES
Assessment/Plan:    Problem List Items Addressed This Visit        Other    Numbness and tingling in both hands - Primary    Overview     -h/o carpal tunnel syndrome   -reports numbness/tingling and swelling in her hands, different from previous symptoms  -will order EMG/NCS of bilateral upper extremities  -wrist braces at bedtime for CTS  -follow up if no improvement or worsening of symptoms           Relevant Orders    EMG W/ ULTRASOUND AND NERVE CONDUCTION TEST 2 Extremities          Follow up if symptoms worsen or fail to improve.    Yue Jeffrey PA-C  _____________________________________________________________________________________________________________________________________________________    CC: numbness/tingling in hands    HPI: Patient is in clinic today as an established patient here for numbness/tingling in her hands. She stated that about 4 days ago, she was bitten by an ant in which she had left hand swelling. She reports cutting grass the next day using her push mower and she noticed worsening of swelling and numbness/tingling in the first 4 digits of her left hand and in the first digit of her right hand. She describes it as feeling like she had frostbite in her fingertips. She has a history of carpal tunnel syndrome, but stated that this feels different than her usual symptoms. Today, she stated that the numbness/tingling and swelling has improved, but she is concerned about her symptoms. She reports wearing wrist braces for her history of carpal tunnel syndrome. She has not tried anything else for relief of symptoms. She denies any other complaints at this time.     Past Medical History:   Diagnosis Date    Anesthesia complication     delayed urination    Asthma     allergy induced    Bronchial pneumonia     2008    Depression     GERD (gastroesophageal reflux disease)     Gestational diabetes     History of bronchitis     Hypertension     Postoperative urinary retention       Past Surgical History:   Procedure Laterality Date    CHOLECYSTECTOMY      COLONOSCOPY N/A 7/15/2021    Procedure: COLONOSCOPY;  Surgeon: Jackie Soares MD;  Location: [facility];  Service: Endoscopy;  Laterality: N/A;    CYSTOSCOPY W/ URETERAL STENT PLACEMENT Right 12/29/2021    Procedure: CYSTOSCOPY, WITH URETERAL STENT INSERTION;  Surgeon: Falguni Keys MD;  Location: [facility] OR;  Service: Urology;  Laterality: Right;    CYSTOURETEROSCOPY WITH RETROGRADE PYELOGRAPHY AND INSERTION OF STENT INTO URETER Right 1/12/2022    Procedure: CYSTOURETEROSCOPY, WITH RETROGRADE PYELOGRAM AND URETERAL STENT INSERTION;  Surgeon: Falguni Keys MD;  Location: [facility] OR;  Service: Urology;  Laterality: Right;    ESOPHAGOGASTRODUODENOSCOPY N/A 7/15/2021    Procedure: ESOPHAGOGASTRODUODENOSCOPY (EGD);  Surgeon: Jackie Soares MD;  Location: [facility];  Service: Endoscopy;  Laterality: N/A;    EXTRACORPOREAL SHOCK WAVE LITHOTRIPSY Right 12/29/2021    Procedure: flexible cystoscopy for gross hematuria and Right eswl;  Surgeon: Falguni Keys MD;  Location: [facility] OR;  Service: Urology;  Laterality: Right;    LASER LITHOTRIPSY Right 1/12/2022    Procedure: LITHOTRIPSY, USING LASER;  Surgeon: Falguni Keys MD;  Location: [facility] OR;  Service: Urology;  Laterality: Right;    TONSILLECTOMY      TOTAL ABDOMINAL HYSTERECTOMY N/A 1/24/2020    Procedure: HYSTERECTOMY, TOTAL, ABDOMINAL;  Surgeon: Sonia Bird MD;  Location: [facility] OR;  Service: OB/GYN;  Laterality: N/A;    URETEROSCOPIC REMOVAL OF URETERIC CALCULUS Right 1/12/2022    Procedure: REMOVAL, CALCULUS, URETER, URETEROSCOPIC;  Surgeon: Falguni Keys MD;  Location: [facility] OR;  Service: Urology;  Laterality: Right;     Review of patient's allergies indicates:   Allergen Reactions    Hydrocodone Itching and Nausea Only     Social History     Tobacco Use    Smoking status: Former Smoker     Quit date: 2006     Years since  quittin.3    Smokeless tobacco: Never Used   Substance Use Topics    Alcohol use: Yes     Comment: occasionally    Drug use: No     Family History   Problem Relation Age of Onset    Diabetes Mother     Hypertension Mother     Hyperlipidemia Mother     Hyperlipidemia Father     Hypertension Father     Diabetes Brother      Current Outpatient Medications on File Prior to Visit   Medication Sig Dispense Refill    desvenlafaxine succinate (PRISTIQ) 50 MG Tb24 Take 1 tablet (50 mg total) by mouth once daily. 90 tablet 3    irbesartan (AVAPRO) 150 MG tablet Take 1 tablet (150 mg total) by mouth once daily. HOLD AM OF SURGERY 90 tablet 3    MAGNESIUM GLUCONATE ORAL Take 400 mg by mouth once.      multivitamin (THERAGRAN) per tablet Take 1 tablet by mouth once daily. HOLD AM OF SURGERY      pantoprazole (PROTONIX) 40 MG tablet Take 1 tablet (40 mg total) by mouth once daily. 90 tablet 3    potassium citrate (UROCIT-K) 10 mEq (1,080 mg) TbSR Take 1 tablet (10 mEq total) by mouth 2 (two) times daily with meals. Take 1 twice daily with meals to help prevent stones. Ok to see in stool. 180 tablet 3    TURMERIC ORAL Take 3 capsules by mouth once daily. Hold until after surgery      methen-m.blue-s.phos-phsal-hyo (URIBEL) 118-10-40.8-36 mg Cap Take 1 capsule by mouth 4 (four) times daily as needed (urinary pain or spasms). (Patient not taking: Reported on 2022) 30 capsule 3    ondansetron (ZOFRAN-ODT) 4 MG TbDL Take 2 tablets (8 mg total) by mouth 2 (two) times daily. (Patient not taking: Reported on 2022) 15 tablet 0    oxybutynin (DITROPAN-XL) 10 MG 24 hr tablet Take 1 tablet (10 mg total) by mouth once daily. (Patient not taking: Reported on 2022) 30 tablet 0    traMADoL (ULTRAM) 50 mg tablet Take 1 tablet (50 mg total) by mouth every 6 (six) hours. (Patient not taking: Reported on 2022) 15 tablet 0     No current facility-administered medications on file prior to visit.       Review of  "Systems   Constitutional: Negative for chills, diaphoresis, fatigue and fever.   HENT: Negative for congestion, ear pain, postnasal drip, sinus pain and sore throat.    Eyes: Negative for pain and redness.   Respiratory: Negative for cough, chest tightness and shortness of breath.    Cardiovascular: Negative for chest pain and leg swelling.   Gastrointestinal: Negative for abdominal pain, constipation, diarrhea, nausea and vomiting.   Genitourinary: Negative for dysuria and hematuria.   Musculoskeletal: Negative for arthralgias and joint swelling.   Skin: Negative for rash.   Neurological: Positive for numbness. Negative for dizziness, syncope and headaches.       Vitals:    05/04/22 1617 05/04/22 1650   BP: (!) 151/90 138/85   Pulse: 62    Temp: 98.1 °F (36.7 °C)    TempSrc: Oral    Weight: 109.6 kg (241 lb 11.2 oz)    Height: 5' 6" (1.676 m)        Wt Readings from Last 3 Encounters:   05/04/22 109.6 kg (241 lb 11.2 oz)   05/03/22 102.4 kg (225 lb 12 oz)   01/12/22 102.1 kg (225 lb)       Physical Exam  Constitutional:       General: She is not in acute distress.     Appearance: Normal appearance. She is well-developed.   HENT:      Head: Normocephalic and atraumatic.   Eyes:      Conjunctiva/sclera: Conjunctivae normal.   Cardiovascular:      Rate and Rhythm: Normal rate and regular rhythm.      Pulses: Normal pulses.      Heart sounds: Normal heart sounds. No murmur heard.  Pulmonary:      Effort: Pulmonary effort is normal. No respiratory distress.      Breath sounds: Normal breath sounds.   Abdominal:      General: Bowel sounds are normal. There is no distension.      Palpations: Abdomen is soft.      Tenderness: There is no abdominal tenderness.   Musculoskeletal:         General: Normal range of motion.      Cervical back: Normal range of motion and neck supple.   Skin:     General: Skin is warm and dry.      Findings: No rash.   Neurological:      General: No focal deficit present.      Mental Status: She " is alert and oriented to person, place, and time.         Health Maintenance   Topic Date Due    Mammogram  05/31/2022    Lipid Panel  05/25/2022    TETANUS VACCINE  03/08/2028    Hepatitis C Screening  Completed

## 2022-05-16 DIAGNOSIS — K21.9 GASTROESOPHAGEAL REFLUX DISEASE WITHOUT ESOPHAGITIS: ICD-10-CM

## 2022-05-16 DIAGNOSIS — I10 ESSENTIAL HYPERTENSION: ICD-10-CM

## 2022-05-16 RX ORDER — IRBESARTAN 150 MG/1
TABLET ORAL
Qty: 90 TABLET | Refills: 3 | Status: SHIPPED | OUTPATIENT
Start: 2022-05-16 | End: 2022-05-30 | Stop reason: SDUPTHER

## 2022-05-16 RX ORDER — PANTOPRAZOLE SODIUM 40 MG/1
TABLET, DELAYED RELEASE ORAL
Qty: 90 TABLET | Refills: 3 | Status: SHIPPED | OUTPATIENT
Start: 2022-05-16 | End: 2022-05-30 | Stop reason: SDUPTHER

## 2022-05-16 NOTE — TELEPHONE ENCOUNTER
No new care gaps identified.  [facility] Embedded Care Gaps. Reference number: [id number]. 5/16/2022   8:35:26 AM CDT

## 2022-05-16 NOTE — TELEPHONE ENCOUNTER
Refill Routing Note   Medication(s) are not appropriate for processing by [facility] for the following reason(s):      - Patient has been seen in the ED/Hospital since the last PCP visit    [facility] action(s):  Defer          Medication reconciliation completed: No     Appointments  past 12m or future 3m with PCP    Date Provider   Last Visit   5/26/2021 Rufino Blancas MD   Next Visit   5/30/2022 Rufino Blancas MD   ED visits in past 90 days: 0        Note composed:3:13 PM 05/16/2022

## 2022-05-25 ENCOUNTER — PATIENT MESSAGE (OUTPATIENT)
Dept: FAMILY MEDICINE | Facility: CLINIC | Age: 47
End: 2022-05-25
Payer: COMMERCIAL

## 2022-05-25 DIAGNOSIS — D50.0 IRON DEFICIENCY ANEMIA DUE TO CHRONIC BLOOD LOSS: ICD-10-CM

## 2022-05-25 DIAGNOSIS — Z12.31 ENCOUNTER FOR SCREENING MAMMOGRAM FOR BREAST CANCER: ICD-10-CM

## 2022-05-25 DIAGNOSIS — R73.9 HYPERGLYCEMIA: Primary | ICD-10-CM

## 2022-05-25 DIAGNOSIS — I10 ESSENTIAL HYPERTENSION: ICD-10-CM

## 2022-05-26 ENCOUNTER — PATIENT MESSAGE (OUTPATIENT)
Dept: FAMILY MEDICINE | Facility: CLINIC | Age: 47
End: 2022-05-26
Payer: COMMERCIAL

## 2022-05-26 NOTE — TELEPHONE ENCOUNTER
Ordered at [facility].   I have signed for the following orders AND/OR meds.  Please call the patient and ask the patient to schedule the testing AND/OR inform about any medications that were sent.      Orders Placed This Encounter   Procedures    Mammo Digital Screening Bilat     Standing Status:   Future     Standing Expiration Date:   5/26/2023     Order Specific Question:   Reason for Exam:     Answer:   screening     Order Specific Question:   Is the patient pregnant?     Answer:   No    CBC Auto Differential     Standing Status:   Future     Standing Expiration Date:   7/25/2023    Iron and TIBC     Standing Status:   Future     Standing Expiration Date:   5/26/2023    Comprehensive Metabolic Panel     Standing Status:   Future     Standing Expiration Date:   5/26/2023    Hemoglobin A1C     Standing Status:   Future     Standing Expiration Date:   5/26/2023    Lipid Panel     Standing Status:   Future     Standing Expiration Date:   5/26/2023    CBC Auto Differential     Standing Status:   Future     Number of Occurrences:   1     Standing Expiration Date:   7/25/2023    Comprehensive Metabolic Panel     Standing Status:   Future     Number of Occurrences:   1     Standing Expiration Date:   5/26/2023    Hemoglobin A1C     Standing Status:   Future     Number of Occurrences:   1     Standing Expiration Date:   5/26/2023    Lipid Panel     Standing Status:   Future     Number of Occurrences:   1     Standing Expiration Date:   5/26/2023    Iron and TIBC     Standing Status:   Future     Number of Occurrences:   1     Standing Expiration Date:   5/26/2023

## 2022-05-29 LAB
ALBUMIN SERPL-MCNC: 4.2 G/DL (ref 3.6–5.1)
ALBUMIN/GLOB SERPL: 1.8 (CALC) (ref 1–2.5)
ALP SERPL-CCNC: 89 U/L (ref 31–125)
ALT SERPL-CCNC: 17 U/L (ref 6–29)
AST SERPL-CCNC: 18 U/L (ref 10–35)
BASOPHILS # BLD AUTO: 38 CELLS/UL (ref 0–200)
BASOPHILS NFR BLD AUTO: 0.6 %
BILIRUB SERPL-MCNC: 0.5 MG/DL (ref 0.2–1.2)
BUN SERPL-MCNC: 18 MG/DL (ref 7–25)
BUN/CREAT SERPL: ABNORMAL (CALC) (ref 6–22)
CALCIUM SERPL-MCNC: 8.9 MG/DL (ref 8.6–10.2)
CHLORIDE SERPL-SCNC: 104 MMOL/L (ref 98–110)
CHOLEST SERPL-MCNC: 189 MG/DL
CHOLEST/HDLC SERPL: 2.7 (CALC)
CO2 SERPL-SCNC: 30 MMOL/L (ref 20–32)
CREAT SERPL-MCNC: 0.98 MG/DL (ref 0.5–1.1)
EOSINOPHIL # BLD AUTO: 211 CELLS/UL (ref 15–500)
EOSINOPHIL NFR BLD AUTO: 3.3 %
ERYTHROCYTE [DISTWIDTH] IN BLOOD BY AUTOMATED COUNT: 13.3 % (ref 11–15)
GLOBULIN SER CALC-MCNC: 2.3 G/DL (CALC) (ref 1.9–3.7)
GLUCOSE SERPL-MCNC: 103 MG/DL (ref 65–99)
HBA1C MFR BLD: 5.2 % OF TOTAL HGB
HCT VFR BLD AUTO: 39.8 % (ref 35–45)
HDLC SERPL-MCNC: 69 MG/DL
HGB BLD-MCNC: 13.2 G/DL (ref 11.7–15.5)
IRON SATN MFR SERPL: 31 % (CALC) (ref 16–45)
IRON SERPL-MCNC: 96 MCG/DL (ref 40–190)
LDLC SERPL CALC-MCNC: 107 MG/DL (CALC)
LYMPHOCYTES # BLD AUTO: 1862 CELLS/UL (ref 850–3900)
LYMPHOCYTES NFR BLD AUTO: 29.1 %
MCH RBC QN AUTO: 29.9 PG (ref 27–33)
MCHC RBC AUTO-ENTMCNC: 33.2 G/DL (ref 32–36)
MCV RBC AUTO: 90 FL (ref 80–100)
MONOCYTES # BLD AUTO: 320 CELLS/UL (ref 200–950)
MONOCYTES NFR BLD AUTO: 5 %
NEUTROPHILS # BLD AUTO: 3968 CELLS/UL (ref 1500–7800)
NEUTROPHILS NFR BLD AUTO: 62 %
NONHDLC SERPL-MCNC: 120 MG/DL (CALC)
PLATELET # BLD AUTO: 147 THOUSAND/UL (ref 140–400)
PMV BLD REES-ECKER: 10.8 FL (ref 7.5–12.5)
POTASSIUM SERPL-SCNC: 4.9 MMOL/L (ref 3.5–5.3)
PROT SERPL-MCNC: 6.5 G/DL (ref 6.1–8.1)
RBC # BLD AUTO: 4.42 MILLION/UL (ref 3.8–5.1)
SODIUM SERPL-SCNC: 141 MMOL/L (ref 135–146)
TIBC SERPL-MCNC: 306 MCG/DL (CALC) (ref 250–450)
TRIGL SERPL-MCNC: 50 MG/DL
WBC # BLD AUTO: 6.4 THOUSAND/UL (ref 3.8–10.8)

## 2022-05-30 ENCOUNTER — OFFICE VISIT (OUTPATIENT)
Dept: FAMILY MEDICINE | Facility: CLINIC | Age: 47
End: 2022-05-30
Payer: COMMERCIAL

## 2022-05-30 VITALS
HEIGHT: 66 IN | DIASTOLIC BLOOD PRESSURE: 86 MMHG | WEIGHT: 238.75 LBS | TEMPERATURE: 98 F | BODY MASS INDEX: 38.37 KG/M2 | HEART RATE: 63 BPM | SYSTOLIC BLOOD PRESSURE: 136 MMHG

## 2022-05-30 DIAGNOSIS — F32.A ANXIETY AND DEPRESSION: ICD-10-CM

## 2022-05-30 DIAGNOSIS — F41.9 ANXIETY: ICD-10-CM

## 2022-05-30 DIAGNOSIS — K21.9 GASTROESOPHAGEAL REFLUX DISEASE WITHOUT ESOPHAGITIS: ICD-10-CM

## 2022-05-30 DIAGNOSIS — F41.9 ANXIETY AND DEPRESSION: ICD-10-CM

## 2022-05-30 DIAGNOSIS — R73.9 HYPERGLYCEMIA: ICD-10-CM

## 2022-05-30 DIAGNOSIS — I10 ESSENTIAL HYPERTENSION: ICD-10-CM

## 2022-05-30 DIAGNOSIS — Z00.00 ANNUAL PHYSICAL EXAM: Primary | ICD-10-CM

## 2022-05-30 DIAGNOSIS — D50.0 IRON DEFICIENCY ANEMIA DUE TO CHRONIC BLOOD LOSS: ICD-10-CM

## 2022-05-30 PROCEDURE — 99396 PR PREVENTIVE VISIT,EST,40-64: ICD-10-PCS | Mod: S$GLB,,, | Performed by: FAMILY MEDICINE

## 2022-05-30 PROCEDURE — 99396 PREV VISIT EST AGE 40-64: CPT | Mod: S$GLB,,, | Performed by: FAMILY MEDICINE

## 2022-05-30 PROCEDURE — 99999 PR PBB SHADOW E&M-EST. PATIENT-LVL III: ICD-10-PCS | Mod: PBBFAC,,, | Performed by: FAMILY MEDICINE

## 2022-05-30 PROCEDURE — 99999 PR PBB SHADOW E&M-EST. PATIENT-LVL III: CPT | Mod: PBBFAC,,, | Performed by: FAMILY MEDICINE

## 2022-05-30 RX ORDER — LANOLIN ALCOHOL/MO/W.PET/CERES
1 CREAM (GRAM) TOPICAL
COMMUNITY
End: 2022-08-15

## 2022-05-30 RX ORDER — PANTOPRAZOLE SODIUM 40 MG/1
40 TABLET, DELAYED RELEASE ORAL DAILY
Qty: 90 TABLET | Refills: 3 | Status: SHIPPED | OUTPATIENT
Start: 2022-05-30 | End: 2023-05-16

## 2022-05-30 RX ORDER — IRBESARTAN 150 MG/1
TABLET ORAL
Qty: 90 TABLET | Refills: 3 | Status: SHIPPED | OUTPATIENT
Start: 2022-05-30 | End: 2023-06-06

## 2022-05-30 RX ORDER — DESVENLAFAXINE SUCCINATE 50 MG/1
50 TABLET, EXTENDED RELEASE ORAL DAILY
Qty: 90 TABLET | Refills: 3 | Status: SHIPPED | OUTPATIENT
Start: 2022-05-30 | End: 2022-11-15

## 2022-05-30 NOTE — PROGRESS NOTES
CC:As Above   HPI:   Problem List Items Addressed This Visit     Anxiety    Overview     The patient has anxiety which is being treated with pristiq.  Treatment has been given since 11/2021.  This has controled the symptoms.  Things that makes it worse include stress and the current treatment makes it better.             Essential hypertension    Overview     The patient presents with essential hypertension.  The patient is tolerating the medication well and is in excellent compliance.  The patient is experiencing no side effects.  Counseling was offered regarding low salt diets.  The patient has a reduced salt intake.  The patient denies chest pain, palpitations, shortness of breath, dyspnea on exertion, left or murmur neck pain, nausea, vomiting, diaphoresis, paroxysmal nocturnal dyspnea, and orthopnea.        Lab Results   Component Value Date     05/25/2021     05/18/2020    CREATININE 0.97 05/25/2021    CREATININE 0.92 05/18/2020    K 4.2 05/25/2021    K 4.3 05/18/2020                Relevant Medications    irbesartan (AVAPRO) 150 MG tablet    Gastroesophageal reflux disease without esophagitis    Overview     The patient presents with GERD.  Denies chest pain, nausea & vomiting, belching, cramping, distention, dyspepsia, dysphagia, hematochezia, melena, abdominal pain and weight loss.  Current treatment has included medications that are listed in medications list with significant response.  There has been no medicine intolerance.  The patient cannot identify any exacerbating factors.  Avoidance of NSAID's, ASA, carbonated beverages and spicy food was discussed.  She has tried going to every other day dosing and she did not tolerate this so she is taking it daily now.             Relevant Medications    pantoprazole (PROTONIX) 40 MG tablet    Hyperglycemia    Overview     She has had hyperglycemia in the past.  This is tract on the patient and the last glucose was a little high..    Lab Results    Component Value Date     (H) 05/28/2022     12/29/2021    GLU 91 05/25/2021     (H) 05/18/2020    GLU 85 01/22/2020     Lab Results   Component Value Date    LABA1C 5.5 03/07/2017    HGBA1C 5.2 05/28/2022     Lab Results   Component Value Date    LABA1C 5.5 03/07/2017    HGBA1C 5.2 05/28/2022         Lab Results   Component Value Date    LABA1C 5.5 03/07/2017    HGBA1C 5.2 05/28/2022                Iron deficiency anemia due to chronic blood loss    Overview     The patient has had a CBC which showed anemia. This has improved over times. She does donate blood.  She has had times that her blood was too low to donate.  She is on protonix for GERD.  She is not hurting in the abdomen and has not seen black or red stools.  She has had a hysterectomy.    Lab Results   Component Value Date    WBC 6.4 05/28/2022    HGB 13.2 05/28/2022    HCT 39.8 05/28/2022    MCV 90.0 05/28/2022     05/28/2022     The iron saturation was low at 13.  Lab Results   Component Value Date    IRON 96 05/28/2022    TIBC 306 05/28/2022     Patient Message on 05/25/2022   Component Date Value Ref Range Status    WBC 05/28/2022 6.4  3.8 - 10.8 Thousand/uL Final    RBC 05/28/2022 4.42  3.80 - 5.10 Million/uL Final    Hemoglobin 05/28/2022 13.2  11.7 - 15.5 g/dL Final    Hematocrit 05/28/2022 39.8  35.0 - 45.0 % Final    MCV 05/28/2022 90.0  80.0 - 100.0 fL Final    MCH 05/28/2022 29.9  27.0 - 33.0 pg Final    MCHC 05/28/2022 33.2  32.0 - 36.0 g/dL Final    RDW 05/28/2022 13.3  11.0 - 15.0 % Final    Platelets 05/28/2022 147  140 - 400 Thousand/uL Final    MPV 05/28/2022 10.8  7.5 - 12.5 fL Final    Neutrophils, Abs 05/28/2022 3,968  1,500 - 7,800 cells/uL Final    Lymph # 05/28/2022 1,862  850 - 3,900 cells/uL Final    Mono # 05/28/2022 320  200 - 950 cells/uL Final    Eos # 05/28/2022 211  15 - 500 cells/uL Final    Baso # 05/28/2022 38  0 - 200 cells/uL Final    Neutrophils Relative 05/28/2022 62  %  Final    Lymph % 05/28/2022 29.1  % Final    Mono % 05/28/2022 5.0  % Final    Eosinophil % 05/28/2022 3.3  % Final    Basophil % 05/28/2022 0.6  % Final    Glucose 05/28/2022 103 (A) 65 - 99 mg/dL Final    Comment:               Fasting reference interval     For someone without known diabetes, a glucose value  between 100 and 125 mg/dL is consistent with  prediabetes and should be confirmed with a  follow-up test.         BUN 05/28/2022 18  7 - 25 mg/dL Final    Creatinine 05/28/2022 0.98  0.50 - 1.10 mg/dL Final    eGFR if non  05/28/2022 68  > OR = 60 mL/min/1.73m2 Final    eGFR if  05/28/2022 79  > OR = 60 mL/min/1.73m2 Final    BUN/Creatinine Ratio 05/28/2022 NOT APPLICABLE  6 - 22 (calc) Final    Sodium 05/28/2022 141  135 - 146 mmol/L Final    Potassium 05/28/2022 4.9  3.5 - 5.3 mmol/L Final    Chloride 05/28/2022 104  98 - 110 mmol/L Final    CO2 05/28/2022 30  20 - 32 mmol/L Final    Calcium 05/28/2022 8.9  8.6 - 10.2 mg/dL Final    Total Protein 05/28/2022 6.5  6.1 - 8.1 g/dL Final    Albumin 05/28/2022 4.2  3.6 - 5.1 g/dL Final    Globulin, Total 05/28/2022 2.3  1.9 - 3.7 g/dL (calc) Final    Albumin/Globulin Ratio 05/28/2022 1.8  1.0 - 2.5 (calc) Final    Total Bilirubin 05/28/2022 0.5  0.2 - 1.2 mg/dL Final    Alkaline Phosphatase 05/28/2022 89  31 - 125 U/L Final    AST 05/28/2022 18  10 - 35 U/L Final    ALT 05/28/2022 17  6 - 29 U/L Final    Hemoglobin A1C 05/28/2022 5.2  <5.7 % of total Hgb Final    Comment: For the purpose of screening for the presence of  diabetes:     <5.7%       Consistent with the absence of diabetes  5.7-6.4%    Consistent with increased risk for diabetes              (prediabetes)  > or =6.5%  Consistent with diabetes     This assay result is consistent with a decreased risk  of diabetes.     Currently, no consensus exists regarding use of  hemoglobin A1c for diagnosis of diabetes in children.     According to American  Diabetes Association (ADA)  guidelines, hemoglobin A1c <7.0% represents optimal  control in non-pregnant diabetic patients. Different  metrics may apply to specific patient populations.   Standards of Medical Care in Diabetes(ADA).          Cholesterol 05/28/2022 189  <200 mg/dL Final    HDL 05/28/2022 69  > OR = 50 mg/dL Final    Triglycerides 05/28/2022 50  <150 mg/dL Final    LDL Cholesterol 05/28/2022 107 (A) mg/dL (calc) Final    Comment: Reference range: <100     Desirable range <100 mg/dL for primary prevention;    <70 mg/dL for patients with CHD or diabetic patients   with > or = 2 CHD risk factors.     LDL-C is now calculated using the Jaime   calculation, which is a validated novel method providing   better accuracy than the Friedewald equation in the   estimation of LDL-C.   Yomi BRUCE et al. ROBBIE. 2013;310(19): 5711-9464   (http://education.[employer].Graymatics/faq/XVD117)      HDL/Cholesterol Ratio 05/28/2022 2.7  <5.0 (calc) Final    Non HDL Chol. (LDL+VLDL) 05/28/2022 120  <130 mg/dL (calc) Final    Comment: For patients with diabetes plus 1 major ASCVD risk   factor, treating to a non-HDL-C goal of <100 mg/dL   (LDL-C of <70 mg/dL) is considered a therapeutic   option.      Iron 05/28/2022 96  40 - 190 mcg/dL Final    TIBC 05/28/2022 306  250 - 450 mcg/dL (calc) Final    Iron Saturation 05/28/2022 31  16 - 45 % (calc) Final                Other Visit Diagnoses     Annual physical exam    -  Primary    Anxiety and depression        Relevant Medications    desvenlafaxine succinate (PRISTIQ) 50 MG Tb24             The patient's Health Maintenance was reviewed and the following appears to be due at this time:   Health Maintenance Due   Topic Date Due    COVID-19 Vaccine (3 - Booster) 02/15/2022    Mammogram  05/31/2022          Outpatient Medications Prior to Visit   Medication Sig Dispense Refill    ferrous sulfate (FEOSOL) Tab tablet Take 1 tablet by mouth daily with breakfast.       MAGNESIUM GLUCONATE ORAL Take 400 mg by mouth once.      multivitamin (THERAGRAN) per tablet Take 1 tablet by mouth once daily. HOLD AM OF SURGERY      potassium citrate (UROCIT-K) 10 mEq (1,080 mg) TbSR Take 1 tablet (10 mEq total) by mouth 2 (two) times daily with meals. Take 1 twice daily with meals to help prevent stones. Ok to see in stool. 180 tablet 3    TURMERIC ORAL Take 3 capsules by mouth once daily. Hold until after surgery      desvenlafaxine succinate (PRISTIQ) 50 MG Tb24 Take 1 tablet (50 mg total) by mouth once daily. 90 tablet 3    irbesartan (AVAPRO) 150 MG tablet TAKE ONE TABLET BY MOUTH DAILY - Hold the morning of surgery 90 tablet 3    pantoprazole (PROTONIX) 40 MG tablet TAKE ONE TABLET BY MOUTH DAILY 90 tablet 3     No facility-administered medications prior to visit.         PMH:  Past Medical History:   Diagnosis Date    Anesthesia complication     delayed urination    Asthma     allergy induced    Bronchial pneumonia     2008    Depression     GERD (gastroesophageal reflux disease)     Gestational diabetes     History of bronchitis     Hypertension     Postoperative urinary retention      Past Surgical History:   Procedure Laterality Date    CHOLECYSTECTOMY      COLONOSCOPY N/A 7/15/2021    Procedure: COLONOSCOPY;  Surgeon: Jackie Soares MD;  Location: [facility];  Service: Endoscopy;  Laterality: N/A;    CYSTOSCOPY W/ URETERAL STENT PLACEMENT Right 12/29/2021    Procedure: CYSTOSCOPY, WITH URETERAL STENT INSERTION;  Surgeon: Falguni Keys MD;  Location: [facility];  Service: Urology;  Laterality: Right;    CYSTOURETEROSCOPY WITH RETROGRADE PYELOGRAPHY AND INSERTION OF STENT INTO URETER Right 1/12/2022    Procedure: CYSTOURETEROSCOPY, WITH RETROGRADE PYELOGRAM AND URETERAL STENT INSERTION;  Surgeon: Falguni Keys MD;  Location: [facility];  Service: Urology;  Laterality: Right;    ESOPHAGOGASTRODUODENOSCOPY N/A 7/15/2021    Procedure:  ESOPHAGOGASTRODUODENOSCOPY (EGD);  Surgeon: Jackie Soares MD;  Location[facility];  Service: Endoscopy;  Laterality: N/A;    EXTRACORPOREAL SHOCK WAVE LITHOTRIPSY Right 12/29/2021    Procedure: flexible cystoscopy for gross hematuria and Right eswl;  Surgeon: Falguni Keys MD;  Location: [facility] OR;  Service: Urology;  Laterality: Right;    LASER LITHOTRIPSY Right 1/12/2022    Procedure: LITHOTRIPSY, USING LASER;  Surgeon: Falguni Keys MD;  Location: [facility] OR;  Service: Urology;  Laterality: Right;    TONSILLECTOMY      TOTAL ABDOMINAL HYSTERECTOMY N/A 1/24/2020    Procedure: HYSTERECTOMY, TOTAL, ABDOMINAL;  Surgeon: Sonia Bird MD;  Location: [facility] OR;  Service: OB/GYN;  Laterality: N/A;    URETEROSCOPIC REMOVAL OF URETERIC CALCULUS Right 1/12/2022    Procedure: REMOVAL, CALCULUS, URETER, URETEROSCOPIC;  Surgeon: Falguni Keys MD;  Location: [facility] OR;  Service: Urology;  Laterality: Right;     Review of patient's allergies indicates:   Allergen Reactions    Hydrocodone Itching and Nausea Only     Current Outpatient Medications on File Prior to Visit   Medication Sig Dispense Refill    ferrous sulfate (FEOSOL) Tab tablet Take 1 tablet by mouth daily with breakfast.      MAGNESIUM GLUCONATE ORAL Take 400 mg by mouth once.      multivitamin (THERAGRAN) per tablet Take 1 tablet by mouth once daily. HOLD AM OF SURGERY      potassium citrate (UROCIT-K) 10 mEq (1,080 mg) TbSR Take 1 tablet (10 mEq total) by mouth 2 (two) times daily with meals. Take 1 twice daily with meals to help prevent stones. Ok to see in stool. 180 tablet 3    TURMERIC ORAL Take 3 capsules by mouth once daily. Hold until after surgery      [DISCONTINUED] desvenlafaxine succinate (PRISTIQ) 50 MG Tb24 Take 1 tablet (50 mg total) by mouth once daily. 90 tablet 3    [DISCONTINUED] irbesartan (AVAPRO) 150 MG tablet TAKE ONE TABLET BY MOUTH DAILY - Hold the morning of surgery 90 tablet 3     [DISCONTINUED] pantoprazole (PROTONIX) 40 MG tablet TAKE ONE TABLET BY MOUTH DAILY 90 tablet 3     No current facility-administered medications on file prior to visit.     Social History     Socioeconomic History    Marital status:    Tobacco Use    Smoking status: Former Smoker     Quit date:      Years since quittin.4    Smokeless tobacco: Never Used   Substance and Sexual Activity    Alcohol use: Yes     Comment: occasionally    Drug use: No    Sexual activity: Yes     Birth control/protection: See Surgical Hx     Social Determinants of Health     Financial Resource Strain: Low Risk     Difficulty of Paying Living Expenses: Not very hard   Food Insecurity: No Food Insecurity    Worried About Running Out of Food in the Last Year: Never true    Ran Out of Food in the Last Year: Never true   Transportation Needs: No Transportation Needs    Lack of Transportation (Medical): No    Lack of Transportation (Non-Medical): No   Physical Activity: Sufficiently Active    Days of Exercise per Week: 5 days    Minutes of Exercise per Session: 40 min   Stress: No Stress Concern Present    Feeling of Stress : Only a little   Social Connections: Unknown    Frequency of Communication with Friends and Family: More than three times a week    Frequency of Social Gatherings with Friends and Family: Patient refused    Active Member of Clubs or Organizations: No    Attends Club or Organization Meetings: Patient refused    Marital Status:    Housing Stability: Low Risk     Unable to Pay for Housing in the Last Year: No    Number of Places Lived in the Last Year: 1    Unstable Housing in the Last Year: No     Family History   Problem Relation Age of Onset    Diabetes Mother     Hypertension Mother     Hyperlipidemia Mother     Hyperlipidemia Father     Hypertension Father     Diabetes Brother        Review of Systems   HENT: Negative for hearing loss.    Eyes: Negative for discharge.  "  Respiratory: Negative for wheezing.    Cardiovascular: Negative for chest pain and palpitations.   Gastrointestinal: Negative for blood in stool, constipation, diarrhea and vomiting.   Genitourinary: Negative for dysuria and hematuria.   Musculoskeletal: Negative for neck pain.   Neurological: Negative for weakness and headaches.   Endo/Heme/Allergies: Negative for polydipsia.        Physical Exam    (Vitals taken at home and reported to us and documented)   Pulse If Self Reported:No flowsheet data found.     Last 5 Patient Entered Readings                Current 30 Day Average:   Recent Readings        SBP (mmHg)        DBP (mmHg)        Pulse                     BP If Self Reported:No flowsheet data found.   Pulse Readings from Last 3 Encounters:   05/30/22 63   05/04/22 62   05/03/22 61      Weight If Self Reported:No flowsheet data found.   Glucose If Self Reported:No flowsheet data found.   Last 6 Patient Entered Readings                                          Most Recent A1c:     There is no flowsheet data to display.        /86   Pulse 63   Temp 98.4 °F (36.9 °C) (Oral)   Ht 5' 6" (1.676 m)   Wt 108.3 kg (238 lb 12.1 oz)   LMP  (LMP Unknown)   BMI 38.54 kg/m²  if verbally reported and entered.    Constitutional: The patient is oriented to person, place, and time and appears well-developed and well-nourished with no distress.    Eyes: EOM are normal.    Pulmonary/Chest: Effort normal. No respiratory distress.    Neurological: The patient is alert and oriented to person, place, and time.    Skin: the patient is not diaphoretic.    Psychiatric: The patient has a normal mood and affect. The behavior is normal. Judgment, thought and content normal.        DIAGNOSIS  Encounter Diagnoses   Name Primary?    Annual physical exam Yes    Essential hypertension     Gastroesophageal reflux disease without esophagitis     Hyperglycemia     Iron deficiency anemia due to chronic blood loss     Anxiety  "    Anxiety and depression           PLAN:     I have changed Danielle Alarcon's pantoprazole. I am also having her maintain her TURMERIC ORAL, multivitamin, potassium citrate, MAGNESIUM GLUCONATE ORAL, irbesartan, desvenlafaxine succinate, and ferrous sulfate.  Problem List Items Addressed This Visit     Anxiety    Essential hypertension    Relevant Medications    irbesartan (AVAPRO) 150 MG tablet    Gastroesophageal reflux disease without esophagitis    Relevant Medications    pantoprazole (PROTONIX) 40 MG tablet    Hyperglycemia    Iron deficiency anemia due to chronic blood loss      Other Visit Diagnoses     Annual physical exam    -  Primary    Anxiety and depression        Relevant Medications    desvenlafaxine succinate (PRISTIQ) 50 MG Tb24        No follow-ups on file.     Danielle was seen today for annual exam.    Diagnoses and all orders for this visit:    Annual physical exam    Essential hypertension  -     irbesartan (AVAPRO) 150 MG tablet; TAKE ONE TABLET BY MOUTH DAILY - Hold the morning of surgery    Gastroesophageal reflux disease without esophagitis  -     pantoprazole (PROTONIX) 40 MG tablet; Take 1 tablet (40 mg total) by mouth once daily.    Hyperglycemia    Iron deficiency anemia due to chronic blood loss    Anxiety    Anxiety and depression  -     desvenlafaxine succinate (PRISTIQ) 50 MG Tb24; Take 1 tablet (50 mg total) by mouth once daily.      Medications Ordered This Encounter   Medications    desvenlafaxine succinate (PRISTIQ) 50 MG Tb24     Sig: Take 1 tablet (50 mg total) by mouth once daily.     Dispense:  90 tablet     Refill:  3    irbesartan (AVAPRO) 150 MG tablet     Sig: TAKE ONE TABLET BY MOUTH DAILY - Hold the morning of surgery     Dispense:  90 tablet     Refill:  3     This prescription was filled on 5/16/2022. Any refills authorized will be placed on file.    pantoprazole (PROTONIX) 40 MG tablet     Sig: Take 1 tablet (40 mg total) by mouth once daily.     Dispense:  90  tablet     Refill:  3     This prescription was filled on 5/16/2022. Any refills authorized will be placed on file.     The patient was instructed to stop the following meds:  Medications Discontinued During This Encounter   Medication Reason    desvenlafaxine succinate (PRISTIQ) 50 MG Tb24 Reorder    pantoprazole (PROTONIX) 40 MG tablet Reorder    irbesartan (AVAPRO) 150 MG tablet Reorder     No orders of the defined types were placed in this encounter.      Medication List with Changes/Refills   Current Medications    FERROUS SULFATE (FEOSOL) TAB TABLET    Take 1 tablet by mouth daily with breakfast.    MAGNESIUM GLUCONATE ORAL    Take 400 mg by mouth once.    MULTIVITAMIN (THERAGRAN) PER TABLET    Take 1 tablet by mouth once daily. HOLD AM OF SURGERY    POTASSIUM CITRATE (UROCIT-K) 10 MEQ (1,080 MG) TBSR    Take 1 tablet (10 mEq total) by mouth 2 (two) times daily with meals. Take 1 twice daily with meals to help prevent stones. Ok to see in stool.    TURMERIC ORAL    Take 3 capsules by mouth once daily. Hold until after surgery   Changed and/or Refilled Medications    Modified Medication Previous Medication    DESVENLAFAXINE SUCCINATE (PRISTIQ) 50 MG TB24 desvenlafaxine succinate (PRISTIQ) 50 MG Tb24       Take 1 tablet (50 mg total) by mouth once daily.    Take 1 tablet (50 mg total) by mouth once daily.    IRBESARTAN (AVAPRO) 150 MG TABLET irbesartan (AVAPRO) 150 MG tablet       TAKE ONE TABLET BY MOUTH DAILY - Hold the morning of surgery    TAKE ONE TABLET BY MOUTH DAILY - Hold the morning of surgery    PANTOPRAZOLE (PROTONIX) 40 MG TABLET pantoprazole (PROTONIX) 40 MG tablet       Take 1 tablet (40 mg total) by mouth once daily.    TAKE ONE TABLET BY MOUTH DAILY      Medication List with Changes/Refills   Current Medications    FERROUS SULFATE (FEOSOL) TAB TABLET    Take 1 tablet by mouth daily with breakfast.       Start Date: --        End Date: --    MAGNESIUM GLUCONATE ORAL    Take 400 mg by mouth  once.       Start Date: --        End Date: --    MULTIVITAMIN (THERAGRAN) PER TABLET    Take 1 tablet by mouth once daily. HOLD AM OF SURGERY       Start Date: --        End Date: --    POTASSIUM CITRATE (UROCIT-K) 10 MEQ (1,080 MG) TBSR    Take 1 tablet (10 mEq total) by mouth 2 (two) times daily with meals. Take 1 twice daily with meals to help prevent stones. Ok to see in stool.       Start Date: 5/3/2022  End Date: 5/3/2023    TURMERIC ORAL    Take 3 capsules by mouth once daily. Hold until after surgery       Start Date: --        End Date: --   Changed and/or Refilled Medications    Modified Medication Previous Medication    DESVENLAFAXINE SUCCINATE (PRISTIQ) 50 MG TB24 desvenlafaxine succinate (PRISTIQ) 50 MG Tb24       Take 1 tablet (50 mg total) by mouth once daily.    Take 1 tablet (50 mg total) by mouth once daily.       Start Date: 5/30/2022 End Date: 5/30/2023    Start Date: 11/16/2021End Date: 5/30/2022    IRBESARTAN (AVAPRO) 150 MG TABLET irbesartan (AVAPRO) 150 MG tablet       TAKE ONE TABLET BY MOUTH DAILY - Hold the morning of surgery    TAKE ONE TABLET BY MOUTH DAILY - Hold the morning of surgery       Start Date: 5/30/2022 End Date: --    Start Date: 5/16/2022 End Date: 5/30/2022    PANTOPRAZOLE (PROTONIX) 40 MG TABLET pantoprazole (PROTONIX) 40 MG tablet       Take 1 tablet (40 mg total) by mouth once daily.    TAKE ONE TABLET BY MOUTH DAILY       Start Date: 5/30/2022 End Date: --    Start Date: 5/16/2022 End Date: 5/30/2022                                 Answers for HPI/ROS submitted by the patient on 5/30/2022  activity change: No  unexpected weight change: No  rhinorrhea: No  trouble swallowing: No  visual disturbance: No  chest tightness: No  polyuria: No  difficulty urinating: No  menstrual problem: No  confusion: No  dysphoric mood: No

## 2022-05-30 NOTE — PATIENT INSTRUCTIONS
"Instructions given to get the recommended vaccines below at their local pharmacy.  Health Maintenance Due   Topic Date Due    COVID-19 Vaccine (3 - Booster) 02/15/2022    Mammogram  05/31/2022    The following handout should help you better understand issues that were addressed.  Obesity and Diet   If your weight is substantially above what is healthy for a person with your body composition, genetic background, and overall health status (based on other risk factors you might have), you may be considered obese.  Obesity is a more serious condition than just being a little overweight because of the increased risk of:    heart diseases    stroke    high blood pressure    diabetes    arthritis    cancer    other disorders.    Your doctor can give you a good sense of whether or not your weight puts you in the category of being obese.  As a rule of thumb, you might find the weight categories shown in the following tables to be useful.                              Ideal Weight for Women              -----------------------------------------------     Height       Small          Medium           Large     in Shoes      Frame          Frame            Frame     --------------------------------------------------------    6'       138 to 151 lb    148 to 162 lb    158 to 179 lb     5'11"    135 to 148 lb    145 to 159 lb    155 to 176 lb     5'10"    132 to 145 lb    142 to 156 lb    152 to 173 lb     5'9"     129 to 142 lb    139 to 153 lb    149 to 170 lb     5'8"     126 to 139 lb    136 to 150 lb    146 to 167 lb     5'7"     123 to 136 lb    133 to 147 lb    143 to 163 lb     5'6"     120 to 133 lb    130 to 144 lb    140 to 159 lb     5'5"     117 to 130 lb    127 to 141 lb    137 to 155 lb     5'4"     114 to 127 lb    124 to 138 lb    134 to 151 lb     5'3"     111 to 124 lb    121 to 135 lb    131 to 147 lb     5'2"     108 to 121 lb    118 to 132 lb    128 to 143 lb     5'1"     106 to 118 lb    115 to 129 lb    125 " "to 140 lb     5'       104 to 115 lb    113 to 126 lb    122 to 137 lb     4'11"    103 to 113 lb    111 to 123 lb    120 to 134 lb     4'10"    102 to 111 lb    109 to 121 lb    118 to 131 lb     --------------------------------------------------------    From height and weight tables of the [employer], 1983.  The ideal weights given in these     tables are for ages 25 to 59.  The weights assume you are     wearing shoes with 1-inch heels and indoor clothing     weighing 3 pounds.                              Ideal Weight for Men              -----------------------------------------------     Height       Small          Medium           Large     in Shoes      Frame          Frame            Frame     --------------------------------------------------------    6'4"     162 to 176 lb    171 to 187 lb    181 to 207 lb     6'3"     158 to 172 lb    167 to 182 lb    176 to 202 lb     6'2"     155 to 168 lb    164 to 178 lb    172 to 197 lb     6'1"     152 to 164 lb    160 to 174 lb    168 to 192 lb     6'       149 to 160 lb    157 to 170 lb    164 to 188 lb     5'11"    146 to 157 lb    154 to 166 lb    161 to 184 lb     5'10"    144 to 154 lb    151 to 163 lb    158 to 180 lb     5'9"     142 to 151 lb    148 to 160 lb    155 to 176 lb     5'8"     140 to 148 lb    145 to 157 lb    152 to 172 lb     5'7"     138 to 145 lb    142 to 154 lb    149 to 168 lb     5'6"     136 to 142 lb    139 to 151 lb    146 to 164 lb     5'5"     134 to 140 lb    137 to 148 lb    144 to 160 lb     5'4"     132 to 138 lb    135 to 145 lb    142 to 156 lb     5'3"     130 to 136 lb    133 to 143 lb    140 to 153 lb     5'2"     128 to 134 lb    131 to 141 lb    138 to 150 lb     --------------------------------------------------------    From height and weight tables of the [employer], 1983.  The ideal weights given in these     tables are for ages 25 to 59.  The weights assume " "you are     wearing shoes with 1-inch heels and indoor clothing     weighing 5 pounds.    Your frame size or body build is determined by the thickness of the bones in your elbows, knees, ankles, and wrists.  [employer] offers this method of determining your body frame size:  Extend your arm and bend your forearm upward at a 90 degree angle.  With your fingers straight, turn the inside of your wrist toward your body.  Place your thumb and index finger of the other hand on the two prominent bones of the elbow.  Measure the space between the fingers against a ruler or a tape measure.  Compare your measurement with the figures in the table below.  Elbow measurements less than those given indicate a small frame; greater measurements indicate a large frame.               Height in               Elbow Breadth for           1-inch Heels                Medium Frame           --------------------------------------------                           Women           4'10" to 5'3"              2 1/4" to 2 1/2"           5'4"  to 6'0"              2 3/8" to 2 5/8"                             Men           5'2"  to 5'7"              2 1/2" to 2 7/8"           5'8"  to 6'3"              2 3/4" to 3 1/8"           6'4"                       2 7/8" to 3 1/4"           --------------------------------------------          [employer], 1983.    It is important to consider body composition.  If you are muscular and athletic, you may weigh more than a sedentary person of the same height and frame size, yet you may be trim, while your sedentary counterpart may be overweight.  If your weight comes from muscle, you may fall technically into the overweight category yet not be fat.  However, in general, as you approach 20% or more above your desirable weight, your excess weight usually comes from fat.    Body fat percentage can be determined by several methods, such as skinfold thickness, " underwater weighing, total body water (hydrometry), and whole body potassium.    Losing weight   Diet is a term associated with controlling the amount and/or types of food eaten.  It most often means a reduction in calories and/or elimination of specific fatty or empty calorie foods.    Losing weight requires a change in behavior that almost always involves:    a better understanding of your own health    healthy eating habits    a plan for rewards for following your program    an increase in the amount of physical activity you regularly engage in.    The most effective diet is not a diet at all, but a gradual change in eating and physical activity habits that you can continue for the rest of your life.  The best way to find a safe, healthy, effective weight reduction program is to seek the advice of your doctor.    An efficient way to lose weight is to reduce the number of calories and improve the quality of the foods you choose.  The best diet is one that helps you lose weight slowly but steadily, so you can maintain your ideal weight after you have reached your goal.    A weight reduction diet needs to provide adequate nutrition, a good variety of foods, and a reduction in calories.  This is usually best done by limiting fat, especially saturated fat, in the diet.  Pregnant women should not go on a weight reduction diet.    Selecting foods to lose weight   Keep a food diary.  As soon as you eat or drink, write it down.  It may be helpful to use a small pocket diary.  Seeing what you eat and drink will help you examine your eating patterns and food habits.    To lose weight in a healthy manner, follow these guidelines:    Drink at least six 8-ounce glasses of water each day.     Choose unlimited amounts of vegetables and salads.     Choose:    lean meats, poultry, and fish    baked or broiled meat, fish, and poultry    salad dressing containing little or no oil.     Choose regular but limited amounts of:     low-fat or skim milk, cheeses, and yogurts    legumes (lentils, peas, and beans)    unrefined carbohydrates (whole wheat bread, whole grain cereals without sugar)    raw fruits.     Significantly limit how much you eat of the following:    refined carbohydrates (sugar) and foods containing sugar    refined grain products such as white rice and white flour.     Avoid:    saturated fats such as butter, margarine, and fat on meats    other foods that contain fats, such as pastries, cakes, and cheese    fried foods    processed meats    alcoholic beverages.    To have a balanced diet, be sure to choose a variety of foods from the five basic food groups:    dairy    meat and other protein    vegetables    fruit    bread and cereal.    Counting calories   A calorie is a unit of measurement used to express the energy value of food. Your body burns calories to use for basic body functions.  Proteins, carbohydrates, and fats contain calories and produce energy.  To lose weight, reduce your calorie intake (without giving up nutrition) while increasing the number of calories used in physical activity.    Eating 500 calories less a day can result in losing 1 pound a week.  One to one and a half pounds (2 pounds maximum) is the ideal amount to lose in a week.  If you lose more than that each week, you begin to lose muscle rather than fat.    The average man needs 2500 to 3000 calories a day.  The average woman needs 1800 to 2300 calories a day.  Most weight reduction diets suggest 1200 to 1500 calories a day for women and 1500 to 1800 calories a day for men.  Ask your doctor or dietitian to help you determine how many calories you need a day.    The rate at which you can lose weight depends on your body's metabolism.  This is the rate at which your body uses the energy from food for basic body functions.  Metabolism can be increased by increasing physical activity.    Weight loss may occur more quickly at the beginning of a  "diet because the body releases extra water that was retained.    Sit down and relax while you eat your meals.  Avoid distractions such as the phone and TV.  Chewing your food thoroughly helps digestion.  Eating small, frequent meals instead of three full meals a day is also helpful.  You should eat every 4 to 5 hours.  This keeps your blood sugar at a constant level and helps keep you from feeling hungry.  Finish your meals with a piece of fruit instead of a sweet dessert.    You must eat a minimum quantity of appropriate food or your body will shut down its metabolism in an effort to survive the lean time.  This happens when people go on "starvation" diets.  The body's survival response prevents them from losing weight.    Popular diets   Popular or fad diets can be divided into several groups:    high-protein diets    specific food diets    calorie-conscious commercial programs.    High-protein diets result in a quick initial loss of weight.  These diets allow unlimited amounts of high-protein foods, but little or no carbohydrates.  Examples include the Atkins Diet, [place] Diet (The University Diet), Herbalife Diet Plan, Jasen Diet, and [place] Diet.  The Federal Drug Administration has reported cases of acute illness from the [place] Diet requiring hospitalization, and a warning has been added to the product label.  The Herbalife Diet Plan may cause diarrhea, nausea, heavy sweating, headaches, and cramps.  The Jasen Diet does not include the five basic food groups and provides only small amounts of vitamins A and C, thiamine, and iron.  In the [place] Diet there is little iron, vitamin A, calcium, and riboflavin because the diet limits milk, bread, and cereals.  It can lead to dehydration because fluids are limited to coffee, tea, and water.    Specific food diets are based on food combinations.  These diets don't count calories, are monotonous, and encourage unrealistic eating habits.  Vitamin and " mineral deficiencies can appear after a few days.  Some of these diets include the [place] (or grapefruit) Diet, the [place] Diet, and high-carbohydrate diets.    The [place] diet is based on the belief that grapefruit causes weight loss by causing fat to be burned off faster.  One of the problems is that the diet is high in saturated fats and cholesterol.  The [place] Diet is low in protein, vitamins, and minerals such as iron, zinc, calcium, and magnesium.  High-carbohydrate diets (for example, Ligia Gtz, Diamante's Eat to Succeed) are based on high-carbohydrate, high-fiber foods.  Some are balanced, others are not.    Calorie-conscious commercial programs and weight loss clinics offer group support and motivation for the dieter, a wide variety of foods, and a calorie intake between 500 and 1500 a day.  These programs are often expensive and should not be used without medical supervision.  Some programs can provide excellent support in changing bad eating habits and maintaining a program over time.    Very low calorie diets and total fasting (eating less than 500 calories a day) are potentially fatal and require medical supervision.    Protein-sparing modified fasting   Protein-sparing modified fasting is used for patients who are more than 30% to 40% over their ideal weight.  Before recommending this diet, your doctor will evaluate your cardiovascular system.  You remain under the doctor's care while you are on this diet.    This diet is not recommended for children, adolescents, pregnant women, nursing mothers, the elderly, those who are moderately overweight, and those who have problems with their health, such as high blood pressure or cardiovascular disease.    A program that combines a very low calorie diet (to achieve a large initial weight loss) with medical supervision, counseling, an exercise program, and behavior modification can provide some initial motivation for a person trying to  deal with obesity.  However, the disadvantages of losing muscle may outweigh any quick success benefit.    For those who compulsively overeat, Mathewryan Nunez may help.  The program is free.  Write or call:   Ezio Nunez   [employer]   [facility]   [place] [address]   Phone:  [phone]   Increasing physical activity to lose weight   The simplest weight management program is not a diet at all.  It is adding daily walking to your routine.  Start with a comfortable goal (5, 10, or 15 minutes per day).  Walk this amount at least four times a week--seven times, if you can manage it.  Each week add 5 minutes to your time until after several weeks you have worked up to 30 to 40 minutes per day.  Invite your spouse or a child you've been meaning to spend more time with.    In addition to the calories you are burning as you walk, regular physical activity increases your metabolic rate.  Therefore, you will be using more calories 24 hours a day, even as you sleep.  If you are unable to walk, ask your doctor to recommend an alternative exercise.    In addition to helping you lose or maintain your weight, regular physical activity lowers your pulse, blood pressure, cholesterol, and blood sugar.  It also increases your energy level.    Developed by [employer]

## 2022-05-30 NOTE — LETTER
May 30, 2022    Danielle Alarcon  [address] [place] [address]  [place] [address]         [facility]  [address]  [place] [address]  Phone: [phone]  Fax: [phone] May 30, 2022     Patient: Danielle Alarcon   YOB: 1975   Date of Visit: 5/30/2022          To Whom It May Concern:     The patient was evaluated for anxiety and depression by me on 5/30/2022.  She has been on Pristiq 50 mg once a day.  There is no presence  of suicidal or homicidal ideation at the time.  The patient had a visit with Nadine Benitez PsyD on 5/11/2021.  She is now actively seeing a therapist in [place].  The patient is currently taking Pristiq 50 mg and there has been no report in the record that there has been any intolerance or that it has been causing any sedation.  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  I believe the nature and severity of the medical condition of the  does not in danger the health and safety of the public.     Sincerely,          Rufino Blancas MD

## 2022-06-01 ENCOUNTER — HOSPITAL ENCOUNTER (OUTPATIENT)
Dept: RADIOLOGY | Facility: HOSPITAL | Age: 47
Discharge: HOME | End: 2022-06-01
Attending: FAMILY MEDICINE
Payer: COMMERCIAL

## 2022-06-01 VITALS — HEIGHT: 66 IN | BODY MASS INDEX: 38.37 KG/M2 | WEIGHT: 238.75 LBS

## 2022-06-01 DIAGNOSIS — Z12.31 ENCOUNTER FOR SCREENING MAMMOGRAM FOR BREAST CANCER: ICD-10-CM

## 2022-06-01 PROCEDURE — 77067 SCR MAMMO BI INCL CAD: CPT | Mod: 26,,, | Performed by: RADIOLOGY

## 2022-06-01 PROCEDURE — 77063 BREAST TOMOSYNTHESIS BI: CPT | Mod: TC,PO

## 2022-06-01 PROCEDURE — 77063 BREAST TOMOSYNTHESIS BI: CPT | Mod: 26,,, | Performed by: RADIOLOGY

## 2022-06-01 PROCEDURE — 77063 MAMMO DIGITAL SCREENING BILAT WITH TOMO: ICD-10-PCS | Mod: 26,,, | Performed by: RADIOLOGY

## 2022-06-01 PROCEDURE — 77067 MAMMO DIGITAL SCREENING BILAT WITH TOMO: ICD-10-PCS | Mod: 26,,, | Performed by: RADIOLOGY

## 2022-06-28 ENCOUNTER — OFFICE VISIT (OUTPATIENT)
Dept: PHYSICAL MEDICINE AND REHAB | Facility: CLINIC | Age: 47
End: 2022-06-28
Payer: COMMERCIAL

## 2022-06-28 VITALS — BODY MASS INDEX: 39.65 KG/M2 | RESPIRATION RATE: 16 BRPM | WEIGHT: 238 LBS | HEIGHT: 65 IN

## 2022-06-28 DIAGNOSIS — G56.03 CARPAL TUNNEL SYNDROME ON BOTH SIDES: ICD-10-CM

## 2022-06-28 PROCEDURE — 95886 PR EMG COMPLETE, W/ NERVE CONDUCTION STUDIES, 5+ MUSCLES: ICD-10-PCS | Mod: S$GLB,,, | Performed by: PHYSICAL MEDICINE & REHABILITATION

## 2022-06-28 PROCEDURE — 95910 NRV CNDJ TEST 7-8 STUDIES: CPT | Mod: S$GLB,,, | Performed by: PHYSICAL MEDICINE & REHABILITATION

## 2022-06-28 PROCEDURE — 95886 MUSC TEST DONE W/N TEST COMP: CPT | Mod: S$GLB,,, | Performed by: PHYSICAL MEDICINE & REHABILITATION

## 2022-06-28 PROCEDURE — 99499 NO LOS: ICD-10-PCS | Mod: S$GLB,,, | Performed by: PHYSICAL MEDICINE & REHABILITATION

## 2022-06-28 PROCEDURE — 99999 PR PBB SHADOW E&M-EST. PATIENT-LVL III: ICD-10-PCS | Mod: PBBFAC,,, | Performed by: PHYSICAL MEDICINE & REHABILITATION

## 2022-06-28 PROCEDURE — 99499 UNLISTED E&M SERVICE: CPT | Mod: S$GLB,,, | Performed by: PHYSICAL MEDICINE & REHABILITATION

## 2022-06-28 PROCEDURE — 95910 PR NERVE CONDUCTION STUDY; 7-8 STUDIES: ICD-10-PCS | Mod: S$GLB,,, | Performed by: PHYSICAL MEDICINE & REHABILITATION

## 2022-06-28 PROCEDURE — 99999 PR PBB SHADOW E&M-EST. PATIENT-LVL III: CPT | Mod: PBBFAC,,, | Performed by: PHYSICAL MEDICINE & REHABILITATION

## 2022-06-28 NOTE — PROGRESS NOTES
[facility]  [address] [facility] [place] [address]             Full Name: Danielle Alarcon Gender: Female  Patient ID: [id number] YOB: 1975  History: Pt with paresthesias of both hands. No DM.      Visit Date: 6/28/2022 8:22 AM  Age: 46 Years  Examining Physician: Nicko Mayer MD  Referring Physician: BRODIE Jeffrey PA-C      Sensory NCS      Nerve / Sites Rec. Site Onset Lat Peak Lat NP Amp PP Amp Segments Distance Velocity     ms ms µV µV  cm m/s   L Median - Digit III (Antidromic)      Wrist Dig III 4.02 5.15 14.4 27.9 Wrist - Dig III 14 35   R Median - Digit III (Antidromic)      Wrist Dig III 3.50 4.23 24.4 37.8 Wrist - Dig III 14 40   L Ulnar - Digit V (Antidromic)      Wrist Dig V 2.40 3.13 21.2 28.5 Wrist - Dig V 14 58   R Ulnar - Digit V (Antidromic)      Wrist Dig V 2.63 3.25 20.7 28.5 Wrist - Dig V 14 53       Motor NCS      Nerve / Sites Muscle Latency Amplitude Amp % Duration Segments Distance Lat Diff Velocity     ms mV % ms  cm ms m/s   R Median - APB      Wrist APB 4.40 13.1 100 6.17 Wrist - APB 8        Elbow APB 8.46 13.2 100 6.35 Elbow - Wrist 23 4.06 57   L Median - APB      Wrist APB 5.92 7.9 100 6.92 Wrist - APB 8        Elbow APB 10.67 7.8 98.7 7.04 Elbow - Wrist 23 4.75 48   R Ulnar - ADM      Wrist ADM 2.67 6.4 100 6.58 Wrist - ADM 8        B.Elbow ADM 5.90 6.8 106 6.63 B.Elbow - Wrist 19 3.23 59      A.Elbow ADM 7.52 6.5 102 6.25 A.Elbow - B.Elbow 10 1.62 62   L Ulnar - ADM      Wrist ADM 2.56 7.0 100 6.44 Wrist - ADM 8        B.Elbow ADM 5.65 7.0 99.1 6.54 B.Elbow - Wrist 19 3.08 62      A.Elbow ADM 7.23 6.6 94.1 6.69 A.Elbow - B.Elbow 10 1.58 63       EMG Summary Table     Spontaneous MUAP Recruitment   Muscle IA Fib PSW Fasc CRD Amp Dur. PPP Pattern   L. Deltoid N None None None None N N N N   L. Biceps brachii N None None None None N N N N   L. Triceps brachii N None None None None N N N N   L. Pronator teres N None None None None N N N N   L. First dorsal  interosseous N None None None None N N N N   L. Abductor pollicis brevis N None None None None N 1+ N N       Summary    The motor conduction test was performed on 4 nerve(s). The results were normal in 3 nerve(s): R Median - APB, R Ulnar - ADM, L Ulnar - ADM. Results outside the specified normal range were found in 1 nerve(s), as follows:   In the L Median - APB study  o the take off latency result was increased for Wrist stimulation  o the take off velocity result was reduced for Elbow - Wrist segment    The sensory conduction test was performed on 4 nerve(s). The results were normal in 2 nerve(s): L Ulnar - Digit V (Antidromic), R Ulnar - Digit V (Antidromic). Results outside the specified normal range were found in 2 nerve(s), as follows:   In the L Median - Digit III (Antidromic) study  o the peak latency result was increased for Wrist stimulation  o the peak amplitude result was reduced for Wrist stimulation   In the R Median - Digit III (Antidromic) study  o the peak latency result was increased for Wrist stimulation    The needle EMG examination was performed in 6 muscles. It was normal in 5 muscle(s): L. Deltoid, L. Biceps brachii, L. Triceps brachii, L. Pronator teres, L. First dorsal interosseous. The study was abnormal in 1 muscle(s), with the following distribution:   The L. Abductor pollicis brevis had abnormal MUP waveforms.      Electrodiagnostic Impression:  1. There is electrodiagnostic evidence of mild right and moderate left median neuropathy at the wrists.  Needle EMG sampling of the median nerve innervated abductor pollicis brevis muscle on the left failed to show signs of active axonal denervation.  However there were signs of chronic neurogenic change.  2. There is insufficient electrodiagnostic evidence for diagnoses of peripheral polyneuropathy, myopathy, or active axonal cervical radiculopathy/brachial plexopathy of the left upper extremity.    Summary:  Mild right and chronic moderate  left carpal tunnel syndrome.    Plan:  Today's test results will be sent to her referring provider, Yue Jeffrey, for further review and direction in her treatment.    Thank you very much for the referral. Please call if you have any questions regarding this study or the report.       ___________________________  Nicko Mayer M.D.

## 2022-06-29 ENCOUNTER — TELEPHONE (OUTPATIENT)
Dept: FAMILY MEDICINE | Facility: CLINIC | Age: 47
End: 2022-06-29
Payer: COMMERCIAL

## 2022-06-29 NOTE — TELEPHONE ENCOUNTER
Please let the patient know that I have reviewed her EMG which showed mild right and chronic moderate left carpal tunnel syndrome. If symptoms persist, we can refer her to ortho/hand specialist for further evaluation and treatment of symptoms. Please let me know if the patient has any additional questions or concerns.

## 2022-07-08 ENCOUNTER — PATIENT MESSAGE (OUTPATIENT)
Dept: FAMILY MEDICINE | Facility: CLINIC | Age: 47
End: 2022-07-08
Payer: COMMERCIAL

## 2022-11-03 ENCOUNTER — HOSPITAL ENCOUNTER (OUTPATIENT)
Dept: RADIOLOGY | Facility: HOSPITAL | Age: 47
Discharge: HOME | End: 2022-11-03
Attending: UROLOGY
Payer: COMMERCIAL

## 2022-11-03 DIAGNOSIS — R82.991 HYPOCITRATURIA: ICD-10-CM

## 2022-11-03 DIAGNOSIS — N20.0 NEPHROLITHIASIS: ICD-10-CM

## 2022-11-03 PROCEDURE — 74018 RADEX ABDOMEN 1 VIEW: CPT | Mod: TC,PO

## 2022-11-03 PROCEDURE — 76770 US EXAM ABDO BACK WALL COMP: CPT | Mod: 26,,, | Performed by: RADIOLOGY

## 2022-11-03 PROCEDURE — 76775 US EXAM ABDO BACK WALL LIM: CPT | Mod: TC,PO

## 2022-11-03 PROCEDURE — 76770 US RETROPERITONEAL COMPLETE: ICD-10-PCS | Mod: 26,,, | Performed by: RADIOLOGY

## 2022-11-03 PROCEDURE — 74018 XR ABDOMEN AP 1 VIEW: ICD-10-PCS | Mod: 26,,, | Performed by: RADIOLOGY

## 2022-11-03 PROCEDURE — 76770 US EXAM ABDO BACK WALL COMP: CPT | Mod: TC,PO

## 2022-11-03 PROCEDURE — 74018 RADEX ABDOMEN 1 VIEW: CPT | Mod: 26,,, | Performed by: RADIOLOGY

## 2022-11-10 ENCOUNTER — OFFICE VISIT (OUTPATIENT)
Dept: UROLOGY | Facility: CLINIC | Age: 47
End: 2022-11-10
Payer: COMMERCIAL

## 2022-11-10 VITALS
HEART RATE: 59 BPM | SYSTOLIC BLOOD PRESSURE: 158 MMHG | WEIGHT: 231.5 LBS | BODY MASS INDEX: 38.57 KG/M2 | HEIGHT: 65 IN | DIASTOLIC BLOOD PRESSURE: 91 MMHG

## 2022-11-10 DIAGNOSIS — N20.0 NEPHROLITHIASIS: ICD-10-CM

## 2022-11-10 DIAGNOSIS — N20.0 RENAL STONES: Primary | ICD-10-CM

## 2022-11-10 DIAGNOSIS — R82.992 HYPEROXALURIA: ICD-10-CM

## 2022-11-10 DIAGNOSIS — R82.991 HYPOCITRATURIA: ICD-10-CM

## 2022-11-10 LAB
BILIRUBIN, UA POC OHS: NEGATIVE
BLOOD, UA POC OHS: NEGATIVE
CLARITY, UA POC OHS: CLEAR
COLOR, UA POC OHS: YELLOW
GLUCOSE, UA POC OHS: NEGATIVE
KETONES, UA POC OHS: NEGATIVE
LEUKOCYTES, UA POC OHS: NEGATIVE
NITRITE, UA POC OHS: NEGATIVE
PH, UA POC OHS: 5
PROTEIN, UA POC OHS: NEGATIVE
SPECIFIC GRAVITY, UA POC OHS: 1.02
UROBILINOGEN, UA POC OHS: 0.2

## 2022-11-10 PROCEDURE — 99999 PR PBB SHADOW E&M-EST. PATIENT-LVL IV: ICD-10-PCS | Mod: PBBFAC,,, | Performed by: UROLOGY

## 2022-11-10 PROCEDURE — 81003 POCT URINALYSIS(INSTRUMENT): ICD-10-PCS | Mod: QW,S$GLB,, | Performed by: UROLOGY

## 2022-11-10 PROCEDURE — 81003 URINALYSIS AUTO W/O SCOPE: CPT | Mod: QW,S$GLB,, | Performed by: UROLOGY

## 2022-11-10 PROCEDURE — 99213 OFFICE O/P EST LOW 20 MIN: CPT | Mod: S$GLB,,, | Performed by: UROLOGY

## 2022-11-10 PROCEDURE — 99999 PR PBB SHADOW E&M-EST. PATIENT-LVL IV: CPT | Mod: PBBFAC,,, | Performed by: UROLOGY

## 2022-11-10 PROCEDURE — 99213 PR OFFICE/OUTPT VISIT, EST, LEVL III, 20-29 MIN: ICD-10-PCS | Mod: S$GLB,,, | Performed by: UROLOGY

## 2022-11-10 RX ORDER — POTASSIUM CITRATE 10 MEQ/1
10 TABLET, EXTENDED RELEASE ORAL
Qty: 270 TABLET | Refills: 3 | Status: SHIPPED | OUTPATIENT
Start: 2022-11-10 | End: 2023-12-05 | Stop reason: SDUPTHER

## 2022-11-10 NOTE — PROGRESS NOTES
[facility] - [place]  Staff: MD Massimo  PCP: Rufino Blancas MD  Date of Service: 11/10/2022      Subjective:     HPI: Danielle Alarcon is a 47 y.o. female        Initial consult by me in clinic on 12/14/21:  11/2021- she had urgency/frequency which was new. She passed 2 small tiny clots/stones? Saw pcp who ordered a urine test which showed blood. She then passed a few more clots/stones. She had a CTRSS on 11/29/21 which showed a 6mm RLP and 3mm RMP stone. Continued to have urgency, intermittent cloudy urine up until a few days ago after her CT. No flank pain. Not prone to recurrent utis.     No personal or family history of stones. No blood in urine personal or family. 6 pack year hx of smoking, quit 20+ yrs ago. On no blood thinners. Had a hysterectomy      Since she passed the stone she's had singificant urgency. The frequency resolved. She cannot do voided urine sample. Was found to have e.coli uti. Treated with cipro    ctrss 11/29/21    RLP 6mm stone      RMP 4mm stone      11.5cm SSD, 450 HU      12.2cm, 770 HU        12/29/21 Cysto stent and R ESWL of RLP 4mm and 6mm stone.   1/4/22  kub stones not easily visible but lots of bowel gas  1/6/22 ctrss shows 3 fragments now (5mm, 4mm and 2mm)   1/12/22 Pt was offered trial of passage and decided to have repeat ureteroscopy and stone extraction to make sure we remove all the stones. Here for this today. Had her start cipro on 1/7/22/ Cath urine today shows  2+bld/tr leuk, 25 rbc/3 wbc/rare bact  1/12/22:  cysto R urs and stone extractionStone fragment in ureter basketed. Stone fragment in upper pole basketed. Lower pole infundibulum with tiny opening and stone seen through it. Lasered it open, calcified and scarred. Posterior stone was embedded in the tissue and lasered. Anterior stone was fragmented with laser. Could only remove a few small stone fragments. Other fragments to lateral and medial to reach with camera in  the lower pole despite multiple attempts. Right mid pole also had small 4mm stone remaining. Could not reach or access this stone due to long infundibulum. Lasered lower pole infundibulum to a point of some minor bleeding to hopefully prevent rescarring of the wall and closing off the diverticulum. Decided to leave 2 stents to allow maximal drainage and prevent any small blood clots from blocking ureter and placed a coles to allow maximal drainage and promote healing. Coles can be removed tomorrow at home by daughter.  Both stents can be removed Tuesday by pulling the strings. 100% calcium phosphate      Interval history 5/3/22:  rbus 22: Right kidney: The right kidney measures 10.3 cm with normal cortical echogenicity.  No cortical thinning or loss of corticomedullary distinction.  No renal mass or hydronephrosis.  Multiple nonobstructing stones are seen within the lower pole the right kidney measuring up to 6 mm.  kub 22: Bowel gas pattern is within normal limits. No findings to suggest obstruction.  There is been interval removal of the right ureteral stent.  There is a punctate calcific density measuring approximately 2 mm in the right hemipelvis which was not definitively present on priors and may potentially represent a small distal ureteral stone.  No definite correlate seen for the right lower pole renal stones demonstrated on prior CT.  Prior cholecystectomy noted.  ua today: neg  24 hour urine 22: citrate 370. PH 6.483 (high) - likely has DTA    Interval history 11/10/22:  At last visit had her start K cit BID and lithhotlyte twice a day which she's been doing. Returns to discuss repeat 24 hour urine done in august. Shows citrate improved from 370 to 526. Oxalate increased from 37 to 56. Could be due to the peanut butter chocolate bars she was eating.   Rbus 11/3/22: RLP 5mm stone. No hydro. (Stone embedded)  Kub 11/3/22: no stones seen.                 Urine history: grandfather  of  bladder cancer (smoker)  11/10/22 neg  1/11/22            2+bld/tr leuk, 25 rbc/3 wbc/rrare bact  12/14/21          >100k E.coli res to aug, cath: 1 rbc/5 wbc/many bact,. nit+/tr leuk,c yt: acute inflammation and bacteria  11/3/21            2+bld/ tr leuk, 4 rbc/5 wbc       REVIEW OF SYSTEMS:  Negative except for as stated above         Objective:     Vitals:    11/10/22 1359   BP: (!) 158/91   Pulse: (!) 59       Assessment:     Danielle Alarcon is a 47 y.o. female with     1. Renal stones    2. Hypocitraturia    3. Nephrolithiasis    4. Hyperoxaluria        Plan:     increase Kcit 10 tto 3x a day - citrate still a little low and continue Litholyte 1 pack twice a day. 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 tx is citrate. Will do oral replacement with potassium citrate, litholyte and lemonade.   Review low oxalate diet.   rbus and kub in 12 months. Still has stones in RLP too difficult to reach. Would avoid trying to reach these or do eswl in future. Monitor only     F/u in 12 months with rbus and kub prior (if too expensive we can monitor urine only).     Falguni Keys MD

## 2022-11-10 NOTE — PATIENT INSTRUCTIONS
1. Renal stones    2. Hypocitraturia    3. Nephrolithiasis    4. Hyperoxaluria        Plan:     increase Kcit 10 tto 3x a day - citrate still a little low and continue Litholyte 1 pack twice a day. 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 tx is citrate. Will do oral replacement with potassium citrate, litholyte and lemonade.   Review low oxalate diet.   rbus and kub in 12 months. Still has stones in RLP too difficult to reach. Would avoid trying to reach these or do eswl in future. Monitor only     F/u in 12 months with rbus and kub prior (if too expensive we can monitor urine only).

## 2022-11-11 ENCOUNTER — TELEPHONE (OUTPATIENT)
Dept: UROLOGY | Facility: CLINIC | Age: 47
End: 2022-11-11
Payer: COMMERCIAL

## 2022-11-11 NOTE — TELEPHONE ENCOUNTER
Spoke with pharmacist prescription clarified. Patient to take potassium three times daily per 's not. Verbally voiced understanding.

## 2022-11-11 NOTE — TELEPHONE ENCOUNTER
----- Message from Jory Sheridan sent at 11/11/2022  8:58 AM CST -----  Type: Needs Medical Advice  Who Called:  Margarita from [facility]  Symptoms (please be specific):  said she need clear directions on potassium citrate (UROCIT-K) 10 mEq (1,080 mg) TbSR--please call and advise  Best Call Back Number: [phone]

## 2022-11-13 ENCOUNTER — PATIENT MESSAGE (OUTPATIENT)
Dept: ADMINISTRATIVE | Facility: OTHER | Age: 47
End: 2022-11-13
Payer: COMMERCIAL

## 2022-11-13 ENCOUNTER — E-VISIT (OUTPATIENT)
Dept: FAMILY MEDICINE | Facility: CLINIC | Age: 47
End: 2022-11-13
Payer: COMMERCIAL

## 2022-11-13 DIAGNOSIS — U07.1 LAB TEST POSITIVE FOR DETECTION OF COVID-19 VIRUS: Primary | ICD-10-CM

## 2022-11-13 RX ORDER — AZELASTINE 1 MG/ML
2 SPRAY, METERED NASAL 2 TIMES DAILY
Qty: 30 ML | Refills: 11 | Status: SHIPPED | OUTPATIENT
Start: 2022-11-13 | End: 2023-01-30

## 2022-11-13 RX ORDER — MELOXICAM 15 MG/1
15 TABLET ORAL DAILY
Qty: 30 TABLET | Refills: 0 | Status: SHIPPED | OUTPATIENT
Start: 2022-11-13 | End: 2023-01-30

## 2022-11-13 RX ORDER — NIRMATRELVIR AND RITONAVIR 300-100 MG
KIT ORAL
Qty: 30 TABLET | Refills: 0 | Status: SHIPPED | OUTPATIENT
Start: 2022-11-13 | End: 2022-11-18

## 2022-11-13 RX ORDER — BENZONATATE 200 MG/1
200 CAPSULE ORAL 3 TIMES DAILY PRN
Qty: 30 CAPSULE | Refills: 0 | Status: SHIPPED | OUTPATIENT
Start: 2022-11-13 | End: 2023-01-30

## 2022-11-14 ENCOUNTER — PATIENT MESSAGE (OUTPATIENT)
Dept: FAMILY MEDICINE | Facility: CLINIC | Age: 47
End: 2022-11-14
Payer: COMMERCIAL

## 2022-11-14 ENCOUNTER — PATIENT MESSAGE (OUTPATIENT)
Dept: FAMILY MEDICINE | Facility: CLINIC | Age: 47
End: 2022-11-14

## 2022-11-14 DIAGNOSIS — F32.A ANXIETY AND DEPRESSION: ICD-10-CM

## 2022-11-14 DIAGNOSIS — F41.9 ANXIETY AND DEPRESSION: ICD-10-CM

## 2022-11-14 PROCEDURE — 99421 PR E&M, ONLINE DIGIT, EST, < 7 DAYS, 5-10 MINS: ICD-10-PCS | Mod: S$GLB,,, | Performed by: FAMILY MEDICINE

## 2022-11-14 PROCEDURE — 99421 OL DIG E/M SVC 5-10 MIN: CPT | Mod: S$GLB,,, | Performed by: FAMILY MEDICINE

## 2022-11-14 NOTE — LETTER
November 14, 2022    Danielle Alarcon  [address]  [facility] [address]         [place] [id number] [facility] [address]  Phone: [phone]  Fax: [phone] November 14, 2022     Patient: Danielle Alarcon   YOB: 1975   Date: 11/14/2022       To Whom It May Concern:    It is my medical opinion that Danielle Alarcon should remain out of work until 11/21/2022 .    If you have any questions or concerns, please don't hesitate to call.    Sincerely,          Rufino Blancas MD

## 2022-11-14 NOTE — PROGRESS NOTES
Patient ID: Danielle Alarcon is a 47 y.o. female.    Chief Complaint: Cough    The patient initiated a request through [facility] on 11/13/2022 for evaluation and management with a chief complaint of Cough     I evaluated the questionnaire submission on 11/14/2022  .    [facility] Peq Evisit Upper Respitatory/Cough Questionnaire    11/13/2022  6:54 AM CST - Filed by Patient   Do you agree to participate in an E-Visit? Yes   If you have any of the following symptoms, please present to your local ER or call 911:  I acknowledge   What is the main issue that you would like for your doctor to address today? Covid-19   Are you able to take your vital signs? Yes   Systolic Blood Pressure: 138   Diastolic Blood Pressure: 85   Weight: 230   Height: 66   Pulse: 65   Temp: 99.6   Resp:    Pulse Ox: 98   What symptoms do you currently have?  Cough;  Headache;  Nasal Congestion;  Runny nose   Have you had a fever? Yes   What has been the range of your fever? Less than 100.4   When did your symptoms first appear? 11/12/2022   In the last two weeks, have you been in close contact with someone who has COVID-19 or the Flu? Yes , Covid-19   In the last two weeks, have you worked or volunteered in a healthcare facility or as a ? Healthcare facilities include a hospital, medical or dental clinic, long-term care facility, or nursing home No   Do you live in a long-term care facility, nursing home, or homeless shelter? No   List what you have done or taken to help your symptoms. Ibuprofen, hot tea   How severe are your symptoms? Mild   Have you taken an at home Covid test? Yes   What were the results? Positive   Have you taken a Flu test? No   Have you been fully vaccinated for COVID? (2 Pfizer, 2 Moderna or 1 Christian & Christian vaccine injections) Yes   Have you received a booster? Yes   Have you recieved a Flu shot? No   Do you have transportation to get tested for COVID if it is indicated and ordered for you at an [facility]  location? Yes   Provide any information you feel is important to your history not asked above    Please attach any relevant images or files         Active Problem List with Overview Notes    Diagnosis Date Noted    Anxiety 05/30/2022     The patient has anxiety which is being treated with pristiq.  Treatment has been given since 11/2021.  This has controled the symptoms.  Things that makes it worse include stress and the current treatment makes it better.        Numbness and tingling in both hands 05/04/2022     -h/o carpal tunnel syndrome   -reports numbness/tingling and swelling in her hands, different from previous symptoms  -will order EMG/NCS of bilateral upper extremities  -wrist braces at bedtime for CTS  -follow up if no improvement or worsening of symptoms      Iron deficiency anemia due to chronic blood loss 05/26/2021     The patient has had a CBC which showed anemia. This has improved over times. She does donate blood.  She has had times that her blood was too low to donate.  She is on protonix for GERD.  She is not hurting in the abdomen and has not seen black or red stools.  She has had a hysterectomy.    Lab Results   Component Value Date    WBC 6.4 05/28/2022    HGB 13.2 05/28/2022    HCT 39.8 05/28/2022    MCV 90.0 05/28/2022     05/28/2022   The iron saturation was low at 13.  Lab Results   Component Value Date    IRON 96 05/28/2022    TIBC 306 05/28/2022     Patient Message on 05/25/2022   Component Date Value Ref Range Status    WBC 05/28/2022 6.4  3.8 - 10.8 Thousand/uL Final    RBC 05/28/2022 4.42  3.80 - 5.10 Million/uL Final    Hemoglobin 05/28/2022 13.2  11.7 - 15.5 g/dL Final    Hematocrit 05/28/2022 39.8  35.0 - 45.0 % Final    MCV 05/28/2022 90.0  80.0 - 100.0 fL Final    MCH 05/28/2022 29.9  27.0 - 33.0 pg Final    MCHC 05/28/2022 33.2  32.0 - 36.0 g/dL Final    RDW 05/28/2022 13.3  11.0 - 15.0 % Final    Platelets 05/28/2022 147  140 - 400 Thousand/uL Final    MPV 05/28/2022 10.8   7.5 - 12.5 fL Final    Neutrophils, Abs 05/28/2022 3,968  1,500 - 7,800 cells/uL Final    Lymph # 05/28/2022 1,862  850 - 3,900 cells/uL Final    Mono # 05/28/2022 320  200 - 950 cells/uL Final    Eos # 05/28/2022 211  15 - 500 cells/uL Final    Baso # 05/28/2022 38  0 - 200 cells/uL Final    Neutrophils Relative 05/28/2022 62  % Final    Lymph % 05/28/2022 29.1  % Final    Mono % 05/28/2022 5.0  % Final    Eosinophil % 05/28/2022 3.3  % Final    Basophil % 05/28/2022 0.6  % Final    Glucose 05/28/2022 103 (A) 65 - 99 mg/dL Final    Comment:               Fasting reference interval     For someone without known diabetes, a glucose value  between 100 and 125 mg/dL is consistent with  prediabetes and should be confirmed with a  follow-up test.         BUN 05/28/2022 18  7 - 25 mg/dL Final    Creatinine 05/28/2022 0.98  0.50 - 1.10 mg/dL Final    eGFR if non  05/28/2022 68  > OR = 60 mL/min/1.73m2 Final    eGFR if African American 05/28/2022 79  > OR = 60 mL/min/1.73m2 Final    BUN/Creatinine Ratio 05/28/2022 NOT APPLICABLE  6 - 22 (calc) Final    Sodium 05/28/2022 141  135 - 146 mmol/L Final    Potassium 05/28/2022 4.9  3.5 - 5.3 mmol/L Final    Chloride 05/28/2022 104  98 - 110 mmol/L Final    CO2 05/28/2022 30  20 - 32 mmol/L Final    Calcium 05/28/2022 8.9  8.6 - 10.2 mg/dL Final    Total Protein 05/28/2022 6.5  6.1 - 8.1 g/dL Final    Albumin 05/28/2022 4.2  3.6 - 5.1 g/dL Final    Globulin, Total 05/28/2022 2.3  1.9 - 3.7 g/dL (calc) Final    Albumin/Globulin Ratio 05/28/2022 1.8  1.0 - 2.5 (calc) Final    Total Bilirubin 05/28/2022 0.5  0.2 - 1.2 mg/dL Final    Alkaline Phosphatase 05/28/2022 89  31 - 125 U/L Final    AST 05/28/2022 18  10 - 35 U/L Final    ALT 05/28/2022 17  6 - 29 U/L Final    Hemoglobin A1C 05/28/2022 5.2  <5.7 % of total Hgb Final    Comment: For the purpose of screening for the presence of  diabetes:     <5.7%       Consistent with the absence of diabetes  5.7-6.4%     Consistent with increased risk for diabetes              (prediabetes)  > or =6.5%  Consistent with diabetes     This assay result is consistent with a decreased risk  of diabetes.     Currently, no consensus exists regarding use of  hemoglobin A1c for diagnosis of diabetes in children.     According to American Diabetes Association (ADA)  guidelines, hemoglobin A1c <7.0% represents optimal  control in non-pregnant diabetic patients. Different  metrics may apply to specific patient populations.   Standards of Medical Care in Diabetes(ADA).          Cholesterol 05/28/2022 189  <200 mg/dL Final    HDL 05/28/2022 69  > OR = 50 mg/dL Final    Triglycerides 05/28/2022 50  <150 mg/dL Final    LDL Cholesterol 05/28/2022 107 (A) mg/dL (calc) Final    Comment: Reference range: <100     Desirable range <100 mg/dL for primary prevention;    <70 mg/dL for patients with CHD or diabetic patients   with > or = 2 CHD risk factors.     LDL-C is now calculated using the Jaime   calculation, which is a validated novel method providing   better accuracy than the Friedewald equation in the   estimation of LDL-C.   Yomi BRUCE et al. ROBBIE. 2013;310(19): 0747-4236   (http://education.[employer]/faq/BYJ979)      HDL/Cholesterol Ratio 05/28/2022 2.7  <5.0 (calc) Final    Non HDL Chol. (LDL+VLDL) 05/28/2022 120  <130 mg/dL (calc) Final    Comment: For patients with diabetes plus 1 major ASCVD risk   factor, treating to a non-HDL-C goal of <100 mg/dL   (LDL-C of <70 mg/dL) is considered a therapeutic   option.      Iron 05/28/2022 96  40 - 190 mcg/dL Final    TIBC 05/28/2022 306  250 - 450 mcg/dL (calc) Final    Iron Saturation 05/28/2022 31  16 - 45 % (calc) Final       Hyperglycemia 05/19/2020     She has had hyperglycemia in the past.  This is tract on the patient and the last glucose was a little high..    Lab Results   Component Value Date     (H) 05/28/2022     12/29/2021    GLU 91 05/25/2021      (H) 05/18/2020    GLU 85 01/22/2020     Lab Results   Component Value Date    LABA1C 5.5 03/07/2017    HGBA1C 5.2 05/28/2022     Lab Results   Component Value Date    LABA1C 5.5 03/07/2017    HGBA1C 5.2 05/28/2022         Lab Results   Component Value Date    LABA1C 5.5 03/07/2017    HGBA1C 5.2 05/28/2022           Fibroids, submucosal 01/22/2020    Essential hypertension 03/08/2019     The patient presents with essential hypertension.  The patient is tolerating the medication well and is in excellent compliance.  The patient is experiencing no side effects.  Counseling was offered regarding low salt diets.  The patient has a reduced salt intake.  The patient denies chest pain, palpitations, shortness of breath, dyspnea on exertion, left or murmur neck pain, nausea, vomiting, diaphoresis, paroxysmal nocturnal dyspnea, and orthopnea.        Lab Results   Component Value Date     05/25/2021     05/18/2020    CREATININE 0.97 05/25/2021    CREATININE 0.92 05/18/2020    K 4.2 05/25/2021    K 4.3 05/18/2020           Gastroesophageal reflux disease without esophagitis 03/08/2019     The patient presents with GERD.  Denies chest pain, nausea & vomiting, belching, cramping, distention, dyspepsia, dysphagia, hematochezia, melena, abdominal pain and weight loss.  Current treatment has included medications that are listed in medications list with significant response.  There has been no medicine intolerance.  The patient cannot identify any exacerbating factors.  Avoidance of NSAID's, ASA, carbonated beverages and spicy food was discussed.  She has tried going to every other day dosing and she did not tolerate this so she is taking it daily now.        Fibroids 03/08/2019    Strain of right trapezius muscle 08/07/2017    Calcific tendonitis of right shoulder 08/07/2017    Arthritis of right acromioclavicular joint 08/07/2017    Acute pain of right shoulder 08/07/2017    Biliary colic 10/07/2013      Recent Labs  Obtained:  Office Visit on 11/10/2022   Component Date Value Ref Range Status    Color, POC UA 11/10/2022 Yellow  Yellow, Straw, Colorless Final    Clarity, POC UA 11/10/2022 Clear  Clear Final    Glucose, POC UA 11/10/2022 Negative  Negative Final    Bilirubin, POC UA 11/10/2022 Negative  Negative Final    Ketones, POC UA 11/10/2022 Negative  Negative Final    Spec Grav POC UA 11/10/2022 1.025  1.005 - 1.030 Final    Blood, POC UA 11/10/2022 Negative  Negative Final    pH, POC UA 11/10/2022 5.0  5.0 - 8.0 Final    Protein, POC UA 11/10/2022 Negative  Negative Final    Urobilinogen, POC UA 11/10/2022 0.2  <=1.0 Final    Nitrite, POC UA 11/10/2022 Negative  Negative Final    WBC, POC UA 11/10/2022 Negative  Negative Final       Encounter Diagnosis   Name Primary?    Lab test positive for detection of COVID-19 virus Yes        No orders of the defined types were placed in this encounter.     Medications Ordered This Encounter   Medications    azelastine (ASTELIN) 137 mcg (0.1 %) nasal spray     Si sprays (274 mcg total) by Nasal route 2 (two) times daily.     Dispense:  30 mL     Refill:  11    benzonatate (TESSALON) 200 MG capsule     Sig: Take 1 capsule (200 mg total) by mouth 3 (three) times daily as needed for Cough.     Dispense:  30 capsule     Refill:  0    meloxicam (MOBIC) 15 MG tablet     Sig: Take 1 tablet (15 mg total) by mouth once daily.     Dispense:  30 tablet     Refill:  0    nirmatrelvir-ritonavir (PAXLOVID, EUA,) 300 mg (150 mg x 2)-100 mg copackaged tablets (EUA)     Sig: Take 3 tablets by mouth 2 (two) times daily. Each dose contains 2 nirmatrelvir (pink tablets) and 1 ritonavir (white tablet). Take all 3 tablets together     Dispense:  30 tablet     Refill:  0        E-Visit Time Tracking:    Day 1 Time (in minutes): 5  Day 2 Time (in minutes): 2     Total Time (in minutes): 7

## 2022-11-14 NOTE — TELEPHONE ENCOUNTER
No new care gaps identified.  [facility] Embedded Care Gaps. Reference number: [id number]. 11/14/2022   9:09:39 AM CST

## 2022-11-15 ENCOUNTER — PATIENT MESSAGE (OUTPATIENT)
Dept: FAMILY MEDICINE | Facility: CLINIC | Age: 47
End: 2022-11-15
Payer: COMMERCIAL

## 2022-11-15 VITALS — SYSTOLIC BLOOD PRESSURE: 130 MMHG | DIASTOLIC BLOOD PRESSURE: 90 MMHG

## 2022-11-15 RX ORDER — DESVENLAFAXINE SUCCINATE 50 MG/1
TABLET, EXTENDED RELEASE ORAL
Qty: 90 TABLET | Refills: 0 | Status: SHIPPED | OUTPATIENT
Start: 2022-11-15 | End: 2023-05-16 | Stop reason: SDUPTHER

## 2022-11-15 NOTE — TELEPHONE ENCOUNTER
Refill Routing Note   Medication(s) are not appropriate for processing by [facility] for the following reason(s):      - Required vitals are abnormal    [facility] action(s):  Defer          Medication reconciliation completed: No     Appointments  past 12m or future 3m with PCP    Date Provider   Last Visit   5/30/2022 Rufino Blancas MD   Next Visit   Visit date not found Rufino Blancas MD   ED visits in past 90 days: 0        Note composed:6:56 PM 11/14/2022

## 2022-11-15 NOTE — TELEPHONE ENCOUNTER
Please call the patient and try to document a good blood pressure in the chart to meet his blood pressure requirements of control and if they remain high, let me know so that I can address this.

## 2022-11-21 ENCOUNTER — TELEPHONE (OUTPATIENT)
Dept: FAMILY MEDICINE | Facility: CLINIC | Age: 47
End: 2022-11-21
Payer: COMMERCIAL

## 2022-11-21 NOTE — TELEPHONE ENCOUNTER
Patient states she had Covid last week and still has a cough and is concerned it will turn into pneumonia or bronchitis. She would like a chest x-ray. Patient scheduled with [facility] on 11/23 to be assessed by provider and chest x-ray ordered if necessary

## 2022-11-21 NOTE — TELEPHONE ENCOUNTER
----- Message from Radha Rendon sent at 11/21/2022  1:01 PM CST -----  Type:  Patient Returning Call    Who Called: pt   Who Left Message for Patient: pt   Does the patient know what this is regarding?: pt had c-19 she is over it and  want to have a xray done   Would the patient rather a call back or a response via [facility]?  Call   Best Call Back Number: [phone]  Additional Information:  call back

## 2022-11-23 ENCOUNTER — HOSPITAL ENCOUNTER (OUTPATIENT)
Dept: RADIOLOGY | Facility: HOSPITAL | Age: 47
Discharge: HOME | End: 2022-11-23
Attending: PHYSICIAN ASSISTANT
Payer: COMMERCIAL

## 2022-11-23 ENCOUNTER — OFFICE VISIT (OUTPATIENT)
Dept: FAMILY MEDICINE | Facility: CLINIC | Age: 47
End: 2022-11-23
Payer: COMMERCIAL

## 2022-11-23 VITALS
HEIGHT: 67 IN | SYSTOLIC BLOOD PRESSURE: 143 MMHG | TEMPERATURE: 99 F | WEIGHT: 228.94 LBS | HEART RATE: 72 BPM | DIASTOLIC BLOOD PRESSURE: 93 MMHG | BODY MASS INDEX: 35.93 KG/M2

## 2022-11-23 DIAGNOSIS — R05.9 COUGH, UNSPECIFIED TYPE: ICD-10-CM

## 2022-11-23 DIAGNOSIS — B96.89 BACTERIAL UPPER RESPIRATORY INFECTION: Primary | ICD-10-CM

## 2022-11-23 DIAGNOSIS — J06.9 BACTERIAL UPPER RESPIRATORY INFECTION: Primary | ICD-10-CM

## 2022-11-23 PROCEDURE — 99999 PR PBB SHADOW E&M-EST. PATIENT-LVL V: CPT | Mod: PBBFAC,,, | Performed by: PHYSICIAN ASSISTANT

## 2022-11-23 PROCEDURE — 71046 X-RAY EXAM CHEST 2 VIEWS: CPT | Mod: TC,PO

## 2022-11-23 PROCEDURE — 99213 OFFICE O/P EST LOW 20 MIN: CPT | Mod: S$GLB,,, | Performed by: PHYSICIAN ASSISTANT

## 2022-11-23 PROCEDURE — 71046 X-RAY EXAM CHEST 2 VIEWS: CPT | Mod: 26,,, | Performed by: RADIOLOGY

## 2022-11-23 PROCEDURE — 99213 PR OFFICE/OUTPT VISIT, EST, LEVL III, 20-29 MIN: ICD-10-PCS | Mod: S$GLB,,, | Performed by: PHYSICIAN ASSISTANT

## 2022-11-23 PROCEDURE — 71046 XR CHEST PA AND LATERAL: ICD-10-PCS | Mod: 26,,, | Performed by: RADIOLOGY

## 2022-11-23 PROCEDURE — 99999 PR PBB SHADOW E&M-EST. PATIENT-LVL V: ICD-10-PCS | Mod: PBBFAC,,, | Performed by: PHYSICIAN ASSISTANT

## 2022-11-23 RX ORDER — AZITHROMYCIN 250 MG/1
TABLET, FILM COATED ORAL
Qty: 6 TABLET | Refills: 0 | Status: SHIPPED | OUTPATIENT
Start: 2022-11-23 | End: 2023-01-30

## 2022-11-23 RX ORDER — METHYLPREDNISOLONE 4 MG/1
TABLET ORAL
Qty: 21 TABLET | Refills: 0 | Status: SHIPPED | OUTPATIENT
Start: 2022-11-23 | End: 2023-01-30

## 2022-11-23 NOTE — PROGRESS NOTES
Assessment/Plan:    Problem List Items Addressed This Visit    None  Visit Diagnoses       Bacterial upper respiratory infection    -  Primary    Relevant Medications    azithromycin (Z-JOVANY) 250 MG tablet    methylPREDNISolone (MEDROL DOSEPACK) 4 mg tablet    Cough, unspecified type        Relevant Orders    X-Ray Chest PA And Lateral (Completed)          -start steroids and antibiotics as prescribed  -will also obtain CXR today  -recommend Flonase and Mucinex  -supportive care- rest, increase hydration with water, OTC Tylenol/Ibuprofen for pain/fever  -follow up if no improvement in symptoms   -ER precautions for severe or worsening of symptoms      Follow up if symptoms worsen or fail to improve.    Yue Jeffrey PA-C  _____________________________________________________________________________________________________________________________________________________    CC: URI symptoms    HPI: Patient is in clinic today as an established patient here for URI symptoms. Patient reports testing positive for COVID on 11/13. She saw her PCP and was prescribed Paxlovid in which she had some relief of symptoms with recurrence of symptoms over the past few days. She reports congestion, sinus pressure, postnasal drip, cough, and fatigue. Pertinent negatives include no fever, chills, diaphoresis, ear pain, headaches, hoarse voice, neck pain, shortness of breath, sneezing, sore throat or swollen glands. Past treatments include zyrtec. The treatment provided no relief. She has family members who are sick with similar symptoms. She is fully vaccinated for COVID. No other complaints today.     Past Medical History:   Diagnosis Date    Anesthesia complication     delayed urination    Asthma     allergy induced    Bronchial pneumonia     2008    Depression     GERD (gastroesophageal reflux disease)     Gestational diabetes     History of bronchitis     Hypertension     Postoperative urinary retention      Past Surgical  History:   Procedure Laterality Date    CHOLECYSTECTOMY      COLONOSCOPY N/A 7/15/2021    Procedure: COLONOSCOPY;  Surgeon: Jackie Soares MD;  Location: [facility];  Service: Endoscopy;  Laterality: N/A;    CYSTOSCOPY W/ URETERAL STENT PLACEMENT Right 2021    Procedure: CYSTOSCOPY, WITH URETERAL STENT INSERTION;  Surgeon: Falguni Keys MD;  Location: [facility] OR;  Service: Urology;  Laterality: Right;    CYSTOURETEROSCOPY WITH RETROGRADE PYELOGRAPHY AND INSERTION OF STENT INTO URETER Right 2022    Procedure: CYSTOURETEROSCOPY, WITH RETROGRADE PYELOGRAM AND URETERAL STENT INSERTION;  Surgeon: Falguni Keys MD;  Location: [facility] OR;  Service: Urology;  Laterality: Right;    ESOPHAGOGASTRODUODENOSCOPY N/A 7/15/2021    Procedure: ESOPHAGOGASTRODUODENOSCOPY (EGD);  Surgeon: Jackie Soares MD;  Location: [facility];  Service: Endoscopy;  Laterality: N/A;    EXTRACORPOREAL SHOCK WAVE LITHOTRIPSY Right 2021    Procedure: flexible cystoscopy for gross hematuria and Right eswl;  Surgeon: Falguni Keys MD;  Location: [facility] OR;  Service: Urology;  Laterality: Right;    LASER LITHOTRIPSY Right 2022    Procedure: LITHOTRIPSY, USING LASER;  Surgeon: Falguni Keys MD;  Location: [facility] OR;  Service: Urology;  Laterality: Right;    TONSILLECTOMY      TOTAL ABDOMINAL HYSTERECTOMY N/A 2020    Procedure: HYSTERECTOMY, TOTAL, ABDOMINAL;  Surgeon: Sonia Bird MD;  Location: [facility] OR;  Service: OB/GYN;  Laterality: N/A;    URETEROSCOPIC REMOVAL OF URETERIC CALCULUS Right 2022    Procedure: REMOVAL, CALCULUS, URETER, URETEROSCOPIC;  Surgeon: Falguni Keys MD;  Location: [facility] OR;  Service: Urology;  Laterality: Right;     Review of patient's allergies indicates:   Allergen Reactions    Hydrocodone Itching and Nausea Only     Social History     Tobacco Use    Smoking status: Former     Types: Cigarettes     Quit date:      Years since quittin.9     Smokeless tobacco: Never   Substance Use Topics    Alcohol use: Yes     Comment: occasionally    Drug use: No     Family History   Problem Relation Age of Onset    Diabetes Mother     Hypertension Mother     Hyperlipidemia Mother     Hyperlipidemia Father     Hypertension Father     Diabetes Brother      Current Outpatient Medications on File Prior to Visit   Medication Sig Dispense Refill    desvenlafaxine succinate (PRISTIQ) 50 MG Tb24 TAKE ONE TABLET BY MOUTH DAILY 90 tablet 0    irbesartan (AVAPRO) 150 MG tablet TAKE ONE TABLET BY MOUTH DAILY - Hold the morning of surgery 90 tablet 3    MAGNESIUM GLUCONATE ORAL Take 400 mg by mouth once.      meloxicam (MOBIC) 15 MG tablet Take 1 tablet (15 mg total) by mouth once daily. 30 tablet 0    multivitamin (THERAGRAN) per tablet Take 1 tablet by mouth once daily. HOLD AM OF SURGERY      pantoprazole (PROTONIX) 40 MG tablet Take 1 tablet (40 mg total) by mouth once daily. 90 tablet 3    potassium citrate (UROCIT-K) 10 mEq (1,080 mg) TbSR Take 1 tablet (10 mEq total) by mouth 3 (three) times daily with meals. Take 1 twice daily with meals to help prevent stones. Ok to see in stool. 270 tablet 3    TURMERIC ORAL Take 3 capsules by mouth once daily. Hold until after surgery      azelastine (ASTELIN) 137 mcg (0.1 %) nasal spray 2 sprays (274 mcg total) by Nasal route 2 (two) times daily. (Patient not taking: Reported on 11/23/2022) 30 mL 11    benzonatate (TESSALON) 200 MG capsule Take 1 capsule (200 mg total) by mouth 3 (three) times daily as needed for Cough. (Patient not taking: Reported on 11/23/2022) 30 capsule 0    ferrous sulfate (FEOSOL) 325 mg (65 mg iron) Tab tablet TAKE ONE TABLET BY MOUTH DAILY (Patient not taking: Reported on 11/23/2022) 90 tablet 3     No current facility-administered medications on file prior to visit.       Review of Systems   Constitutional:  Positive for fatigue. Negative for chills, diaphoresis and fever.   HENT:  Positive for  "congestion, postnasal drip and sinus pressure. Negative for ear pain, sinus pain and sore throat.    Eyes:  Negative for pain and redness.   Respiratory:  Positive for cough. Negative for chest tightness and shortness of breath.    Cardiovascular:  Negative for chest pain and leg swelling.   Gastrointestinal:  Negative for abdominal pain, constipation, diarrhea, nausea and vomiting.   Genitourinary:  Negative for dysuria and hematuria.   Musculoskeletal:  Negative for arthralgias and joint swelling.   Skin:  Negative for rash.   Neurological:  Negative for dizziness, syncope and headaches.   Psychiatric/Behavioral:  Negative for dysphoric mood. The patient is not nervous/anxious.      Vitals:    11/23/22 0848   BP: (!) 143/93   Pulse: 72   Temp: 99 °F (37.2 °C)   TempSrc: Oral   Weight: 103.8 kg (228 lb 15.2 oz)   Height: 5' 7" (1.702 m)       Wt Readings from Last 3 Encounters:   11/23/22 103.8 kg (228 lb 15.2 oz)   11/10/22 105 kg (231 lb 7.7 oz)   06/28/22 108 kg (238 lb)       Physical Exam  Constitutional:       General: She is not in acute distress.     Appearance: Normal appearance. She is well-developed.   HENT:      Head: Normocephalic and atraumatic.      Right Ear: Tympanic membrane normal.      Left Ear: Tympanic membrane normal.      Nose: Congestion present.      Mouth/Throat:      Pharynx: Posterior oropharyngeal erythema present.   Eyes:      Conjunctiva/sclera: Conjunctivae normal.   Cardiovascular:      Rate and Rhythm: Normal rate and regular rhythm.      Pulses: Normal pulses.      Heart sounds: Normal heart sounds. No murmur heard.  Pulmonary:      Effort: Pulmonary effort is normal. No respiratory distress.      Breath sounds: Normal breath sounds. No wheezing.   Abdominal:      General: Bowel sounds are normal. There is no distension.      Palpations: Abdomen is soft.      Tenderness: There is no abdominal tenderness.   Musculoskeletal:         General: Normal range of motion.      Cervical " back: Normal range of motion and neck supple.   Skin:     General: Skin is warm and dry.      Findings: No rash.   Neurological:      General: No focal deficit present.      Mental Status: She is alert and oriented to person, place, and time.   Psychiatric:         Mood and Affect: Mood normal.         Behavior: Behavior normal.       Health Maintenance   Topic Date Due    Lipid Panel  05/28/2023    Mammogram  06/01/2023    TETANUS VACCINE  03/08/2028    Hepatitis C Screening  Completed

## 2022-11-23 NOTE — PROGRESS NOTES
Results have been released via [employer]. Please verify that these have been viewed by patient. If not, please call patient with results.     I have sent a message to them with the following interpretation (see below).    I have reviewed your recent chest XR which showed no significant abnormalities.     Please do not hesitate to call or message with any additional questions or concerns.    Yue Jeffrey PA-C

## 2022-12-05 ENCOUNTER — PATIENT MESSAGE (OUTPATIENT)
Dept: FAMILY MEDICINE | Facility: CLINIC | Age: 47
End: 2022-12-05
Payer: COMMERCIAL

## 2022-12-08 ENCOUNTER — PATIENT MESSAGE (OUTPATIENT)
Dept: FAMILY MEDICINE | Facility: CLINIC | Age: 47
End: 2022-12-08

## 2022-12-08 ENCOUNTER — CLINICAL SUPPORT (OUTPATIENT)
Dept: FAMILY MEDICINE | Facility: CLINIC | Age: 47
End: 2022-12-08
Payer: COMMERCIAL

## 2022-12-08 VITALS — DIASTOLIC BLOOD PRESSURE: 66 MMHG | SYSTOLIC BLOOD PRESSURE: 109 MMHG | HEART RATE: 66 BPM

## 2022-12-08 DIAGNOSIS — Z01.30 BP CHECK: Primary | ICD-10-CM

## 2022-12-08 PROCEDURE — 99999 PR PBB SHADOW E&M-EST. PATIENT-LVL III: CPT | Mod: PBBFAC,,,

## 2022-12-08 PROCEDURE — 99999 PR PBB SHADOW E&M-EST. PATIENT-LVL III: ICD-10-PCS | Mod: PBBFAC,,,

## 2022-12-23 ENCOUNTER — PATIENT MESSAGE (OUTPATIENT)
Dept: FAMILY MEDICINE | Facility: CLINIC | Age: 47
End: 2022-12-23
Payer: COMMERCIAL

## 2023-01-29 ENCOUNTER — E-VISIT (OUTPATIENT)
Dept: FAMILY MEDICINE | Facility: CLINIC | Age: 48
End: 2023-01-29
Payer: COMMERCIAL

## 2023-01-29 DIAGNOSIS — L25.9 CONTACT DERMATITIS, UNSPECIFIED CONTACT DERMATITIS TYPE, UNSPECIFIED TRIGGER: Primary | ICD-10-CM

## 2023-01-29 PROCEDURE — 99421 OL DIG E/M SVC 5-10 MIN: CPT | Mod: S$GLB,,, | Performed by: FAMILY MEDICINE

## 2023-01-29 PROCEDURE — 99421 PR E&M, ONLINE DIGIT, EST, < 7 DAYS, 5-10 MINS: ICD-10-PCS | Mod: S$GLB,,, | Performed by: FAMILY MEDICINE

## 2023-01-29 RX ORDER — TRIAMCINOLONE ACETONIDE 5 MG/G
CREAM TOPICAL 2 TIMES DAILY
Qty: 30 G | Refills: 0 | Status: SHIPPED | OUTPATIENT
Start: 2023-01-29 | End: 2023-05-01

## 2023-01-29 RX ORDER — METHYLPREDNISOLONE 4 MG/1
TABLET ORAL
Qty: 21 TABLET | Refills: 0 | Status: SHIPPED | OUTPATIENT
Start: 2023-01-29 | End: 2023-05-01

## 2023-01-30 NOTE — PROGRESS NOTES
Patient ID: Danielle Alarcon is a 47 y.o. female.    Chief Complaint: Rash    The patient initiated a request through [employer] on 1/29/2023 for evaluation and management with a chief complaint of Rash     I evaluated the questionnaire submission on 1/29/2023.    Ohs Peq Evisit Rash    1/29/2023  1:25 PM CST - Filed by Patient   Do you agree to participate in an E-Visit? Yes   If you have any of the following symptoms, please present to your local ER or call 911:  I acknowledge   What is the main issue that you would like for your doctor to address today? Weeping, angry, itchy rash   Are you able to take your vital signs? Yes   Systolic Blood Pressure: 145   Diastolic Blood Pressure: 90   Weight: 225   Height: 66   Pulse: 75   Temp: 98.3   Resp:    Pulse Ox: 98   How would you describe your skin problem? Rash   When did your symptoms first appear? 1/25/2023   Where is it located?  Scalp;  Face;  Neck;  Chest;  Arm(s);  Other   Does it itch? Yes   Does it hurt? Yes   Where is the pain located? Where the skin change is noted   The pain came on: Gradually   The pain has the character of: Burning   Frequency of the pain (How often does it appear)? This is the first time I have had this rash   Rate your pain with 1 being no pain and 10 being the worst pain of your life. (range: 1 - 10) 2   Is there discharge or drainage? Yes   Describe the discharge or drainage. Yellow colored   Is there bleeding? No   Describe the character Spots;  Raised;  Scaly;  Blistered;  Open;  Clear fluid filled;  Draining;  Scabs   Describe the color Red   Has it changed over time? Spread to other locations   Frequency of skin problem Daily   Duration of the skin problem (how long does it stay when it is present) Days   I have had a new exposure to Chemicals;  Insects/bugs;  Poison ivy/poison oak   Are you currently pregnant, could you be pregnant, or are you breast feeding? None of the above   What have you used to treat the skin problem?  Benadryl, Hydrocortisone cream, Calamine   If you have used anything for treatment, has it helped the symptoms? Maybe   Other generalized symptoms that you associate with the rash No other symptoms   At least one photo is required for treatment to be provided. You can upload a maximum of three photos of the affected area.            Active Problem List with Overview Notes    Diagnosis Date Noted    Anxiety 05/30/2022     The patient has anxiety which is being treated with pristiq.  Treatment has been given since 11/2021.  This has controled the symptoms.  Things that makes it worse include stress and the current treatment makes it better.        Numbness and tingling in both hands 05/04/2022     -h/o carpal tunnel syndrome   -reports numbness/tingling and swelling in her hands, different from previous symptoms  -will order EMG/NCS of bilateral upper extremities  -wrist braces at bedtime for CTS  -follow up if no improvement or worsening of symptoms      Iron deficiency anemia due to chronic blood loss 05/26/2021     The patient has had a CBC which showed anemia. This has improved over times. She does donate blood.  She has had times that her blood was too low to donate.  She is on protonix for GERD.  She is not hurting in the abdomen and has not seen black or red stools.  She has had a hysterectomy.    Lab Results   Component Value Date    WBC 6.4 05/28/2022    HGB 13.2 05/28/2022    HCT 39.8 05/28/2022    MCV 90.0 05/28/2022     05/28/2022   The iron saturation was low at 13.  Lab Results   Component Value Date    IRON 96 05/28/2022    TIBC 306 05/28/2022     Patient Message on 05/25/2022   Component Date Value Ref Range Status    WBC 05/28/2022 6.4  3.8 - 10.8 Thousand/uL Final    RBC 05/28/2022 4.42  3.80 - 5.10 Million/uL Final    Hemoglobin 05/28/2022 13.2  11.7 - 15.5 g/dL Final    Hematocrit 05/28/2022 39.8  35.0 - 45.0 % Final    MCV 05/28/2022 90.0  80.0 - 100.0 fL Final    MCH 05/28/2022 29.9  27.0  - 33.0 pg Final    MCHC 05/28/2022 33.2  32.0 - 36.0 g/dL Final    RDW 05/28/2022 13.3  11.0 - 15.0 % Final    Platelets 05/28/2022 147  140 - 400 Thousand/uL Final    MPV 05/28/2022 10.8  7.5 - 12.5 fL Final    Neutrophils, Abs 05/28/2022 3,968  1,500 - 7,800 cells/uL Final    Lymph # 05/28/2022 1,862  850 - 3,900 cells/uL Final    Mono # 05/28/2022 320  200 - 950 cells/uL Final    Eos # 05/28/2022 211  15 - 500 cells/uL Final    Baso # 05/28/2022 38  0 - 200 cells/uL Final    Neutrophils Relative 05/28/2022 62  % Final    Lymph % 05/28/2022 29.1  % Final    Mono % 05/28/2022 5.0  % Final    Eosinophil % 05/28/2022 3.3  % Final    Basophil % 05/28/2022 0.6  % Final    Glucose 05/28/2022 103 (A) 65 - 99 mg/dL Final    Comment:               Fasting reference interval     For someone without known diabetes, a glucose value  between 100 and 125 mg/dL is consistent with  prediabetes and should be confirmed with a  follow-up test.         BUN 05/28/2022 18  7 - 25 mg/dL Final    Creatinine 05/28/2022 0.98  0.50 - 1.10 mg/dL Final    eGFR if non  05/28/2022 68  > OR = 60 mL/min/1.73m2 Final    eGFR if African American 05/28/2022 79  > OR = 60 mL/min/1.73m2 Final    BUN/Creatinine Ratio 05/28/2022 NOT APPLICABLE  6 - 22 (calc) Final    Sodium 05/28/2022 141  135 - 146 mmol/L Final    Potassium 05/28/2022 4.9  3.5 - 5.3 mmol/L Final    Chloride 05/28/2022 104  98 - 110 mmol/L Final    CO2 05/28/2022 30  20 - 32 mmol/L Final    Calcium 05/28/2022 8.9  8.6 - 10.2 mg/dL Final    Total Protein 05/28/2022 6.5  6.1 - 8.1 g/dL Final    Albumin 05/28/2022 4.2  3.6 - 5.1 g/dL Final    Globulin, Total 05/28/2022 2.3  1.9 - 3.7 g/dL (calc) Final    Albumin/Globulin Ratio 05/28/2022 1.8  1.0 - 2.5 (calc) Final    Total Bilirubin 05/28/2022 0.5  0.2 - 1.2 mg/dL Final    Alkaline Phosphatase 05/28/2022 89  31 - 125 U/L Final    AST 05/28/2022 18  10 - 35 U/L Final    ALT 05/28/2022 17  6 - 29 U/L Final    Hemoglobin A1C  05/28/2022 5.2  <5.7 % of total Hgb Final    Comment: For the purpose of screening for the presence of  diabetes:     <5.7%       Consistent with the absence of diabetes  5.7-6.4%    Consistent with increased risk for diabetes              (prediabetes)  > or =6.5%  Consistent with diabetes     This assay result is consistent with a decreased risk  of diabetes.     Currently, no consensus exists regarding use of  hemoglobin A1c for diagnosis of diabetes in children.     According to American Diabetes Association (ADA)  guidelines, hemoglobin A1c <7.0% represents optimal  control in non-pregnant diabetic patients. Different  metrics may apply to specific patient populations.   Standards of Medical Care in Diabetes(ADA).          Cholesterol 05/28/2022 189  <200 mg/dL Final    HDL 05/28/2022 69  > OR = 50 mg/dL Final    Triglycerides 05/28/2022 50  <150 mg/dL Final    LDL Cholesterol 05/28/2022 107 (A) mg/dL (calc) Final    Comment: Reference range: <100     Desirable range <100 mg/dL for primary prevention;    <70 mg/dL for patients with CHD or diabetic patients   with > or = 2 CHD risk factors.     LDL-C is now calculated using the Yomi-Bon   calculation, which is a validated novel method providing   better accuracy than the Friedewald equation in the   estimation of LDL-C.   Yomi SS et al. ROBBIE. 2013;310(19): 2421-1343   (http://education.[employer]/faq/INH583)      HDL/Cholesterol Ratio 05/28/2022 2.7  <5.0 (calc) Final    Non HDL Chol. (LDL+VLDL) 05/28/2022 120  <130 mg/dL (calc) Final    Comment: For patients with diabetes plus 1 major ASCVD risk   factor, treating to a non-HDL-C goal of <100 mg/dL   (LDL-C of <70 mg/dL) is considered a therapeutic   option.      Iron 05/28/2022 96  40 - 190 mcg/dL Final    TIBC 05/28/2022 306  250 - 450 mcg/dL (calc) Final    Iron Saturation 05/28/2022 31  16 - 45 % (calc) Final       Hyperglycemia 05/19/2020     She has had hyperglycemia in the past.  This is  no tub baths/no heavy lifting/elevate extremity/no sports/gym tract on the patient and the last glucose was a little high..    Lab Results   Component Value Date     (H) 05/28/2022     12/29/2021    GLU 91 05/25/2021     (H) 05/18/2020    GLU 85 01/22/2020     Lab Results   Component Value Date    LABA1C 5.5 03/07/2017    HGBA1C 5.2 05/28/2022     Lab Results   Component Value Date    LABA1C 5.5 03/07/2017    HGBA1C 5.2 05/28/2022         Lab Results   Component Value Date    LABA1C 5.5 03/07/2017    HGBA1C 5.2 05/28/2022           Fibroids, submucosal 01/22/2020    Essential hypertension 03/08/2019     The patient presents with essential hypertension.  The patient is tolerating the medication well and is in excellent compliance.  The patient is experiencing no side effects.  Counseling was offered regarding low salt diets.  The patient has a reduced salt intake.  The patient denies chest pain, palpitations, shortness of breath, dyspnea on exertion, left or murmur neck pain, nausea, vomiting, diaphoresis, paroxysmal nocturnal dyspnea, and orthopnea.        Lab Results   Component Value Date     05/25/2021     05/18/2020    CREATININE 0.97 05/25/2021    CREATININE 0.92 05/18/2020    K 4.2 05/25/2021    K 4.3 05/18/2020           Gastroesophageal reflux disease without esophagitis 03/08/2019     The patient presents with GERD.  Denies chest pain, nausea & vomiting, belching, cramping, distention, dyspepsia, dysphagia, hematochezia, melena, abdominal pain and weight loss.  Current treatment has included medications that are listed in medications list with significant response.  There has been no medicine intolerance.  The patient cannot identify any exacerbating factors.  Avoidance of NSAID's, ASA, carbonated beverages and spicy food was discussed.  She has tried going to every other day dosing and she did not tolerate this so she is taking it daily now.        Fibroids 03/08/2019    Strain of right trapezius muscle 08/07/2017    Calcific  tendonitis of right shoulder 08/07/2017    Arthritis of right acromioclavicular joint 08/07/2017    Acute pain of right shoulder 08/07/2017    Biliary colic 10/07/2013      Recent Labs Obtained:  No visits with results within 7 Day(s) from this visit.   Latest known visit with results is:   Office Visit on 11/10/2022   Component Date Value Ref Range Status    Color, POC UA 11/10/2022 Yellow  Yellow, Straw, Colorless Final    Clarity, POC UA 11/10/2022 Clear  Clear Final    Glucose, POC UA 11/10/2022 Negative  Negative Final    Bilirubin, POC UA 11/10/2022 Negative  Negative Final    Ketones, POC UA 11/10/2022 Negative  Negative Final    Spec Grav POC UA 11/10/2022 1.025  1.005 - 1.030 Final    Blood, POC UA 11/10/2022 Negative  Negative Final    pH, POC UA 11/10/2022 5.0  5.0 - 8.0 Final    Protein, POC UA 11/10/2022 Negative  Negative Final    Urobilinogen, POC UA 11/10/2022 0.2  <=1.0 Final    Nitrite, POC UA 11/10/2022 Negative  Negative Final    WBC, POC UA 11/10/2022 Negative  Negative Final       Encounter Diagnosis   Name Primary?    Contact dermatitis, unspecified contact dermatitis type, unspecified trigger Yes        No orders of the defined types were placed in this encounter.     Medications Ordered This Encounter   Medications    methylPREDNISolone (MEDROL DOSEPACK) 4 mg tablet     Sig: follow package directions     Dispense:  21 tablet     Refill:  0    triamcinolone acetonide 0.5% (KENALOG) 0.5 % Crea     Sig: Apply topically 2 (two) times daily. for 10 days     Dispense:  30 g     Refill:  0        E-Visit Time Tracking:    Day 1 Time (in minutes): 5     Total Time (in minutes): 5

## 2023-02-02 ENCOUNTER — PATIENT MESSAGE (OUTPATIENT)
Dept: FAMILY MEDICINE | Facility: CLINIC | Age: 48
End: 2023-02-02
Payer: COMMERCIAL

## 2023-05-01 ENCOUNTER — PATIENT MESSAGE (OUTPATIENT)
Dept: FAMILY MEDICINE | Facility: CLINIC | Age: 48
End: 2023-05-01

## 2023-05-01 ENCOUNTER — HOSPITAL ENCOUNTER (OUTPATIENT)
Dept: RADIOLOGY | Facility: HOSPITAL | Age: 48
Discharge: HOME | End: 2023-05-01
Attending: FAMILY MEDICINE
Payer: COMMERCIAL

## 2023-05-01 ENCOUNTER — E-VISIT (OUTPATIENT)
Dept: FAMILY MEDICINE | Facility: CLINIC | Age: 48
End: 2023-05-01

## 2023-05-01 ENCOUNTER — CLINICAL SUPPORT (OUTPATIENT)
Dept: FAMILY MEDICINE | Facility: CLINIC | Age: 48
End: 2023-05-01
Payer: COMMERCIAL

## 2023-05-01 DIAGNOSIS — R05.9 COUGH, UNSPECIFIED TYPE: ICD-10-CM

## 2023-05-01 DIAGNOSIS — R05.9 COUGH, UNSPECIFIED TYPE: Primary | ICD-10-CM

## 2023-05-01 LAB
CTP QC/QA: YES
FLUAV AG NPH QL: NEGATIVE
FLUBV AG NPH QL: NEGATIVE
S PYO RRNA THROAT QL PROBE: NEGATIVE
SARS-COV-2 RDRP RESP QL NAA+PROBE: NEGATIVE

## 2023-05-01 PROCEDURE — 99422 PR E&M, ONLINE DIGIT, EST, < 7 DAYS,  11-20 MINS: ICD-10-PCS | Mod: 95,,, | Performed by: FAMILY MEDICINE

## 2023-05-01 PROCEDURE — 87880 STREP A ASSAY W/OPTIC: CPT | Mod: QW,,, | Performed by: FAMILY MEDICINE

## 2023-05-01 PROCEDURE — 87804 INFLUENZA ASSAY W/OPTIC: CPT | Mod: QW,S$GLB,, | Performed by: FAMILY MEDICINE

## 2023-05-01 PROCEDURE — 87635: ICD-10-PCS | Mod: QW,S$GLB,, | Performed by: FAMILY MEDICINE

## 2023-05-01 PROCEDURE — 71046 X-RAY EXAM CHEST 2 VIEWS: CPT | Mod: 26,,, | Performed by: RADIOLOGY

## 2023-05-01 PROCEDURE — 99999 PR PBB SHADOW E&M-EST. PATIENT-LVL I: ICD-10-PCS | Mod: PBBFAC,,,

## 2023-05-01 PROCEDURE — 87804 POCT INFLUENZA A/B: ICD-10-PCS | Mod: QW,S$GLB,, | Performed by: FAMILY MEDICINE

## 2023-05-01 PROCEDURE — 99422 OL DIG E/M SVC 11-20 MIN: CPT | Mod: 95,,, | Performed by: FAMILY MEDICINE

## 2023-05-01 PROCEDURE — 87880 POCT RAPID STREP A: ICD-10-PCS | Mod: QW,,, | Performed by: FAMILY MEDICINE

## 2023-05-01 PROCEDURE — 71046 X-RAY EXAM CHEST 2 VIEWS: CPT | Mod: TC,PO

## 2023-05-01 PROCEDURE — 87635 SARS-COV-2 COVID-19 AMP PRB: CPT | Mod: QW,S$GLB,, | Performed by: FAMILY MEDICINE

## 2023-05-01 PROCEDURE — 71046 XR CHEST PA AND LATERAL: ICD-10-PCS | Mod: 26,,, | Performed by: RADIOLOGY

## 2023-05-01 PROCEDURE — 99999 PR PBB SHADOW E&M-EST. PATIENT-LVL I: CPT | Mod: PBBFAC,,,

## 2023-05-01 RX ORDER — LIDOCAINE HYDROCHLORIDE 20 MG/ML
SOLUTION OROPHARYNGEAL
Qty: 120 ML | Refills: 1 | Status: SHIPPED | OUTPATIENT
Start: 2023-05-01 | End: 2023-06-06

## 2023-05-01 RX ORDER — MELOXICAM 15 MG/1
15 TABLET ORAL DAILY
Qty: 30 TABLET | Refills: 0 | Status: SHIPPED | OUTPATIENT
Start: 2023-05-01 | End: 2023-06-06

## 2023-05-01 RX ORDER — BENZONATATE 200 MG/1
200 CAPSULE ORAL 3 TIMES DAILY PRN
Qty: 30 CAPSULE | Refills: 0 | Status: SHIPPED | OUTPATIENT
Start: 2023-05-01 | End: 2023-06-06

## 2023-05-01 RX ORDER — MINERAL OIL
180 ENEMA (ML) RECTAL DAILY
Qty: 10 TABLET | Refills: 0 | Status: SHIPPED | OUTPATIENT
Start: 2023-05-01 | End: 2023-06-06

## 2023-05-01 NOTE — LETTER
May 3, 2023    Danielle Alarcon  [address]  [place] [address]         [facility]  [address] [facility] [place] [address]  Phone: [phone]  Fax: [phone] May 3, 2023     Patient: Danielle Alarcon   YOB: 1975   Date: 5/1/2023       To Whom It May Concern:    It is my medical opinion that Danielle Alarcon may return to work on 5/4/2023 and should be excused for work missed from 5/1/2023-5/3/2023 .    If you have any questions or concerns, please don't hesitate to call.    Sincerely,          Rufino Blancas MD

## 2023-05-01 NOTE — PROGRESS NOTES
05/01/2023Patient ID: Danielle Alarcon is a 47 y.o. female.    Chief Complaint: Cough    The patient initiated a request through [employer] on 5/1/2023 for evaluation and management with a chief complaint of Cough     I evaluated the questionnaire submission on 05/01/2023.    [facility] Peq Evisit Upper Respitatory/Cough Questionnaire    5/1/2023  8:14 AM CDT - Filed by Patient   Do you agree to participate in an E-Visit? Yes   If you have any of the following symptoms, please present to your local ER or call 911:  I acknowledge   What is the main issue that you would like for your doctor to address today? Flu-like symptoms   Are you able to take your vital signs? Yes   Systolic Blood Pressure: 155   Diastolic Blood Pressure: 90   Weight: 225   Height: 66   Pulse: 110   Temp: 99.8   Resp:    Pulse Ox: 99   Are you currently pregnant, could you be pregnant, or are you breast feeding? None of the above   What symptoms do you currently have?  Chills;  Cough;  Fatigue;  Headache;  Nasal Congestion;  Muscle or body aches;  Runny nose;  Sore throat   Have you had a fever? Yes   What has been the range of your fever? Less than 100.4   When did your symptoms first appear? 4/28/2023   In the last two weeks, have you been in close contact with someone who has COVID-19 or the Flu? No   In the last two weeks, have you worked or volunteered in a healthcare facility or as a ? Healthcare facilities include a hospital, medical or dental clinic, long-term care facility, or nursing home No   Do you live in a long-term care facility, nursing home, group home, or homeless shelter? No   List what you have done or taken to help your symptoms. Ibuprofen, azelastine, throat spray, humidifier   How severe are your symptoms? Mild   Have you taken an at home Covid test? No   Have you taken a Flu test? No   Have you been fully vaccinated for COVID? (2 Pfizer, 2 Moderna or 1 Christian & Christian vaccine injections) Yes   Have you  received a booster? Yes   Have you recieved a Flu shot? No   Do you have transportation to get tested for COVID if it is indicated and ordered for you at an [facility] location? Yes   Provide any information you feel is important to your history not asked above My son had an unidentified virus 2 weeks ago. He tested negative for Covid, flu, and strep.   Please attach any relevant images or files           Active Problem List with Overview Notes    Diagnosis Date Noted    Anxiety 05/30/2022     The patient has anxiety which is being treated with pristiq.  Treatment has been given since 11/2021.  This has controled the symptoms.  Things that makes it worse include stress and the current treatment makes it better.          Numbness and tingling in both hands 05/04/2022     -h/o carpal tunnel syndrome   -reports numbness/tingling and swelling in her hands, different from previous symptoms  -will order EMG/NCS of bilateral upper extremities  -wrist braces at bedtime for CTS  -follow up if no improvement or worsening of symptoms        Iron deficiency anemia due to chronic blood loss 05/26/2021     The patient has had a CBC which showed anemia. This has improved over times. She does donate blood.  She has had times that her blood was too low to donate.  She is on protonix for GERD.  She is not hurting in the abdomen and has not seen black or red stools.  She has had a hysterectomy.    Lab Results   Component Value Date    WBC 6.4 05/28/2022    HGB 13.2 05/28/2022    HCT 39.8 05/28/2022    MCV 90.0 05/28/2022     05/28/2022   The iron saturation was low at 13.  Lab Results   Component Value Date    IRON 96 05/28/2022    TIBC 306 05/28/2022     Patient Message on 05/25/2022   Component Date Value Ref Range Status    WBC 05/28/2022 6.4  3.8 - 10.8 Thousand/uL Final    RBC 05/28/2022 4.42  3.80 - 5.10 Million/uL Final    Hemoglobin 05/28/2022 13.2  11.7 - 15.5 g/dL Final    Hematocrit 05/28/2022 39.8  35.0 - 45.0 % Final     MCV 05/28/2022 90.0  80.0 - 100.0 fL Final    MCH 05/28/2022 29.9  27.0 - 33.0 pg Final    MCHC 05/28/2022 33.2  32.0 - 36.0 g/dL Final    RDW 05/28/2022 13.3  11.0 - 15.0 % Final    Platelets 05/28/2022 147  140 - 400 Thousand/uL Final    MPV 05/28/2022 10.8  7.5 - 12.5 fL Final    Neutrophils, Abs 05/28/2022 3,968  1,500 - 7,800 cells/uL Final    Lymph # 05/28/2022 1,862  850 - 3,900 cells/uL Final    Mono # 05/28/2022 320  200 - 950 cells/uL Final    Eos # 05/28/2022 211  15 - 500 cells/uL Final    Baso # 05/28/2022 38  0 - 200 cells/uL Final    Neutrophils Relative 05/28/2022 62  % Final    Lymph % 05/28/2022 29.1  % Final    Mono % 05/28/2022 5.0  % Final    Eosinophil % 05/28/2022 3.3  % Final    Basophil % 05/28/2022 0.6  % Final    Glucose 05/28/2022 103 (A) 65 - 99 mg/dL Final    Comment:               Fasting reference interval     For someone without known diabetes, a glucose value  between 100 and 125 mg/dL is consistent with  prediabetes and should be confirmed with a  follow-up test.         BUN 05/28/2022 18  7 - 25 mg/dL Final    Creatinine 05/28/2022 0.98  0.50 - 1.10 mg/dL Final    eGFR if non  05/28/2022 68  > OR = 60 mL/min/1.73m2 Final    eGFR if African American 05/28/2022 79  > OR = 60 mL/min/1.73m2 Final    BUN/Creatinine Ratio 05/28/2022 NOT APPLICABLE  6 - 22 (calc) Final    Sodium 05/28/2022 141  135 - 146 mmol/L Final    Potassium 05/28/2022 4.9  3.5 - 5.3 mmol/L Final    Chloride 05/28/2022 104  98 - 110 mmol/L Final    CO2 05/28/2022 30  20 - 32 mmol/L Final    Calcium 05/28/2022 8.9  8.6 - 10.2 mg/dL Final    Total Protein 05/28/2022 6.5  6.1 - 8.1 g/dL Final    Albumin 05/28/2022 4.2  3.6 - 5.1 g/dL Final    Globulin, Total 05/28/2022 2.3  1.9 - 3.7 g/dL (calc) Final    Albumin/Globulin Ratio 05/28/2022 1.8  1.0 - 2.5 (calc) Final    Total Bilirubin 05/28/2022 0.5  0.2 - 1.2 mg/dL Final    Alkaline Phosphatase 05/28/2022 89  31 - 125 U/L Final    AST 05/28/2022 18   10 - 35 U/L Final    ALT 05/28/2022 17  6 - 29 U/L Final    Hemoglobin A1C 05/28/2022 5.2  <5.7 % of total Hgb Final    Comment: For the purpose of screening for the presence of  diabetes:     <5.7%       Consistent with the absence of diabetes  5.7-6.4%    Consistent with increased risk for diabetes              (prediabetes)  > or =6.5%  Consistent with diabetes     This assay result is consistent with a decreased risk  of diabetes.     Currently, no consensus exists regarding use of  hemoglobin A1c for diagnosis of diabetes in children.     According to American Diabetes Association (ADA)  guidelines, hemoglobin A1c <7.0% represents optimal  control in non-pregnant diabetic patients. Different  metrics may apply to specific patient populations.   Standards of Medical Care in Diabetes(ADA).          Cholesterol 05/28/2022 189  <200 mg/dL Final    HDL 05/28/2022 69  > OR = 50 mg/dL Final    Triglycerides 05/28/2022 50  <150 mg/dL Final    LDL Cholesterol 05/28/2022 107 (A) mg/dL (calc) Final    Comment: Reference range: <100     Desirable range <100 mg/dL for primary prevention;    <70 mg/dL for patients with CHD or diabetic patients   with > or = 2 CHD risk factors.     LDL-C is now calculated using the Yomi-Crockett   calculation, which is a validated novel method providing   better accuracy than the Friedewald equation in the   estimation of LDL-C.   Yomi BRUCE et al. ROBBIE. 2013;310(19): 9096-3290   (http://education.[employer]/faq/WNE333)      HDL/Cholesterol Ratio 05/28/2022 2.7  <5.0 (calc) Final    Non HDL Chol. (LDL+VLDL) 05/28/2022 120  <130 mg/dL (calc) Final    Comment: For patients with diabetes plus 1 major ASCVD risk   factor, treating to a non-HDL-C goal of <100 mg/dL   (LDL-C of <70 mg/dL) is considered a therapeutic   option.      Iron 05/28/2022 96  40 - 190 mcg/dL Final    TIBC 05/28/2022 306  250 - 450 mcg/dL (calc) Final    Iron Saturation 05/28/2022 31  16 - 45 % (calc) Final        Hyperglycemia 05/19/2020     She has had hyperglycemia in the past.  This is tract on the patient and the last glucose was a little high..    Lab Results   Component Value Date     (H) 05/28/2022     12/29/2021    GLU 91 05/25/2021     (H) 05/18/2020    GLU 85 01/22/2020     Lab Results   Component Value Date    LABA1C 5.5 03/07/2017    HGBA1C 5.2 05/28/2022     Lab Results   Component Value Date    LABA1C 5.5 03/07/2017    HGBA1C 5.2 05/28/2022         Lab Results   Component Value Date    LABA1C 5.5 03/07/2017    HGBA1C 5.2 05/28/2022           Fibroids, submucosal 01/22/2020    Essential hypertension 03/08/2019     The patient presents with essential hypertension.  The patient is tolerating the medication well and is in excellent compliance.  The patient is experiencing no side effects.  Counseling was offered regarding low salt diets.  The patient has a reduced salt intake.  The patient denies chest pain, palpitations, shortness of breath, dyspnea on exertion, left or murmur neck pain, nausea, vomiting, diaphoresis, paroxysmal nocturnal dyspnea, and orthopnea.        Lab Results   Component Value Date     05/25/2021     05/18/2020    CREATININE 0.97 05/25/2021    CREATININE 0.92 05/18/2020    K 4.2 05/25/2021    K 4.3 05/18/2020           Gastroesophageal reflux disease without esophagitis 03/08/2019     The patient presents with GERD.  Denies chest pain, nausea & vomiting, belching, cramping, distention, dyspepsia, dysphagia, hematochezia, melena, abdominal pain and weight loss.  Current treatment has included medications that are listed in medications list with significant response.  There has been no medicine intolerance.  The patient cannot identify any exacerbating factors.  Avoidance of NSAID's, ASA, carbonated beverages and spicy food was discussed.  She has tried going to every other day dosing and she did not tolerate this so she is taking it daily now.          Fibroids  03/08/2019    Strain of right trapezius muscle 08/07/2017    Calcific tendonitis of right shoulder 08/07/2017    Arthritis of right acromioclavicular joint 08/07/2017    Acute pain of right shoulder 08/07/2017    Biliary colic 10/07/2013      Recent Labs Obtained:  Clinical Support on 05/01/2023   Component Date Value Ref Range Status    POC Rapid COVID 05/01/2023 Negative  Negative Final     Acceptable 05/01/2023 Yes   Final    Rapid Influenza A Ag 05/01/2023 Negative  Negative Final    Rapid Influenza B Ag 05/01/2023 Negative  Negative Final     Acceptable 05/01/2023 Yes   Final   E-Visit on 05/01/2023   Component Date Value Ref Range Status    Rapid Strep A Screen 05/01/2023 Negative  Negative Final     Acceptable 05/01/2023 Yes   Final       Encounter Diagnosis   Name Primary?    Cough, unspecified type Yes        Orders Placed This Encounter   Procedures    X-Ray Chest PA And Lateral     Standing Status:   Future     Number of Occurrences:   1     Standing Expiration Date:   5/31/2023     Order Specific Question:   Reason for Exam:     Answer:   Cough     Order Specific Question:   Is the patient pregnant?     Answer:   No    POCT COVID-19 Rapid Screening     Please come to the clinic during business hours to get your COVID TEST done and call [phone] when you arrive so that one of our staff members can come to your car to perform the testing under the portico.     Standing Status:   Future     Number of Occurrences:   1     Standing Expiration Date:   6/29/2024     Order Specific Question:   Is the patient symptomatic?     Answer:   Yes    POCT Influenza A/B     Standing Status:   Future     Number of Occurrences:   1     Standing Expiration Date:   6/1/2023    POCT rapid strep A      Medications Ordered This Encounter   Medications    benzonatate (TESSALON) 200 MG capsule     Sig: Take 1 capsule (200 mg total) by mouth 3 (three) times daily as needed for Cough.      Dispense:  30 capsule     Refill:  0    fexofenadine (ALLEGRA) 180 MG tablet     Sig: Take 1 tablet (180 mg total) by mouth once daily. for 10 days     Dispense:  10 tablet     Refill:  0    LIDOcaine HCl 2% (XYLOCAINE) 2 % Soln     Sig: Use 5 cc to the back of the throat every 3-4 hours as needed for pain.     Dispense:  120 mL     Refill:  1    meloxicam (MOBIC) 15 MG tablet     Sig: Take 1 tablet (15 mg total) by mouth once daily.     Dispense:  30 tablet     Refill:  0        E-Visit Time Tracking:    Day 1 Time (in minutes): 11     Total Time (in minutes): 11

## 2023-05-16 DIAGNOSIS — F41.9 ANXIETY AND DEPRESSION: ICD-10-CM

## 2023-05-16 DIAGNOSIS — F32.A ANXIETY AND DEPRESSION: ICD-10-CM

## 2023-05-16 RX ORDER — DESVENLAFAXINE SUCCINATE 50 MG/1
50 TABLET, EXTENDED RELEASE ORAL DAILY
Qty: 90 TABLET | Refills: 0 | Status: SHIPPED | OUTPATIENT
Start: 2023-05-16 | End: 2023-06-06 | Stop reason: SDUPTHER

## 2023-05-16 NOTE — TELEPHONE ENCOUNTER
Care Due:                  Date            Visit Type   Department     Provider  --------------------------------------------------------------------------------                                MYCHART                              ANNUAL                              CHECKUP/PHY  [facility] FAM[facility]  Last Visit: 05-      S            LAILA Blancas  Next Visit: None Scheduled  None         None Found                                                            Last  Test          Frequency    Reason                     Performed    Due Date  --------------------------------------------------------------------------------    Office Visit  12 months..  desvenlafaxine,            05- 05-                             fexofenadine, irbesartan,                             pantoprazole.............    CMP.........  12 months..  desvenlafaxine,            05- 05-                             irbesartan...............    [employer] Embedded Care Due Messages. Reference number: [id number].   5/16/2023 8:01:40 AM CDT

## 2023-05-31 DIAGNOSIS — I10 ESSENTIAL HYPERTENSION: ICD-10-CM

## 2023-06-06 ENCOUNTER — LAB VISIT (OUTPATIENT)
Dept: LAB | Facility: HOSPITAL | Age: 48
End: 2023-06-06
Attending: NURSE PRACTITIONER
Payer: COMMERCIAL

## 2023-06-06 ENCOUNTER — OFFICE VISIT (OUTPATIENT)
Dept: FAMILY MEDICINE | Facility: CLINIC | Age: 48
End: 2023-06-06
Payer: COMMERCIAL

## 2023-06-06 VITALS
DIASTOLIC BLOOD PRESSURE: 89 MMHG | SYSTOLIC BLOOD PRESSURE: 139 MMHG | BODY MASS INDEX: 37.2 KG/M2 | WEIGHT: 237 LBS | HEIGHT: 67 IN | HEART RATE: 66 BPM | TEMPERATURE: 98 F

## 2023-06-06 DIAGNOSIS — I10 ESSENTIAL HYPERTENSION: ICD-10-CM

## 2023-06-06 DIAGNOSIS — F41.9 ANXIETY AND DEPRESSION: ICD-10-CM

## 2023-06-06 DIAGNOSIS — Z00.00 ANNUAL PHYSICAL EXAM: Primary | ICD-10-CM

## 2023-06-06 DIAGNOSIS — Z12.31 ENCOUNTER FOR SCREENING MAMMOGRAM FOR BREAST CANCER: ICD-10-CM

## 2023-06-06 DIAGNOSIS — K21.9 GASTROESOPHAGEAL REFLUX DISEASE WITHOUT ESOPHAGITIS: ICD-10-CM

## 2023-06-06 DIAGNOSIS — F41.9 ANXIETY: ICD-10-CM

## 2023-06-06 DIAGNOSIS — F32.A ANXIETY AND DEPRESSION: ICD-10-CM

## 2023-06-06 DIAGNOSIS — Z00.00 ANNUAL PHYSICAL EXAM: ICD-10-CM

## 2023-06-06 LAB
ALBUMIN SERPL BCP-MCNC: 3.7 G/DL (ref 3.5–5.2)
ALP SERPL-CCNC: 107 U/L (ref 55–135)
ALT SERPL W/O P-5'-P-CCNC: 20 U/L (ref 10–44)
ANION GAP SERPL CALC-SCNC: 7 MMOL/L (ref 8–16)
AST SERPL-CCNC: 21 U/L (ref 10–40)
BILIRUB SERPL-MCNC: 0.5 MG/DL (ref 0.1–1)
BUN SERPL-MCNC: 16 MG/DL (ref 6–20)
CALCIUM SERPL-MCNC: 9.3 MG/DL (ref 8.7–10.5)
CHLORIDE SERPL-SCNC: 105 MMOL/L (ref 95–110)
CHOLEST SERPL-MCNC: 183 MG/DL (ref 120–199)
CHOLEST/HDLC SERPL: 2.7 {RATIO} (ref 2–5)
CO2 SERPL-SCNC: 29 MMOL/L (ref 23–29)
CREAT SERPL-MCNC: 1 MG/DL (ref 0.5–1.4)
EST. GFR  (NO RACE VARIABLE): >60 ML/MIN/1.73 M^2
GLUCOSE SERPL-MCNC: 104 MG/DL (ref 70–110)
HDLC SERPL-MCNC: 67 MG/DL (ref 40–75)
HDLC SERPL: 36.6 % (ref 20–50)
LDLC SERPL CALC-MCNC: 103.2 MG/DL (ref 63–159)
NONHDLC SERPL-MCNC: 116 MG/DL
POTASSIUM SERPL-SCNC: 4.5 MMOL/L (ref 3.5–5.1)
PROT SERPL-MCNC: 6.4 G/DL (ref 6–8.4)
SODIUM SERPL-SCNC: 141 MMOL/L (ref 136–145)
TRIGL SERPL-MCNC: 64 MG/DL (ref 30–150)

## 2023-06-06 PROCEDURE — 99999 PR PBB SHADOW E&M-EST. PATIENT-LVL III: ICD-10-PCS | Mod: PBBFAC,,, | Performed by: NURSE PRACTITIONER

## 2023-06-06 PROCEDURE — 1159F MED LIST DOCD IN RCRD: CPT | Mod: CPTII,S$GLB,, | Performed by: NURSE PRACTITIONER

## 2023-06-06 PROCEDURE — 1159F PR MEDICATION LIST DOCUMENTED IN MEDICAL RECORD: ICD-10-PCS | Mod: CPTII,S$GLB,, | Performed by: NURSE PRACTITIONER

## 2023-06-06 PROCEDURE — 80053 COMPREHEN METABOLIC PANEL: CPT | Performed by: NURSE PRACTITIONER

## 2023-06-06 PROCEDURE — 36415 COLL VENOUS BLD VENIPUNCTURE: CPT | Mod: PO | Performed by: NURSE PRACTITIONER

## 2023-06-06 PROCEDURE — 4010F PR ACE/ARB THEARPY RXD/TAKEN: ICD-10-PCS | Mod: CPTII,S$GLB,, | Performed by: NURSE PRACTITIONER

## 2023-06-06 PROCEDURE — 3079F DIAST BP 80-89 MM HG: CPT | Mod: CPTII,S$GLB,, | Performed by: NURSE PRACTITIONER

## 2023-06-06 PROCEDURE — 99396 PREV VISIT EST AGE 40-64: CPT | Mod: S$GLB,,, | Performed by: NURSE PRACTITIONER

## 2023-06-06 PROCEDURE — 1160F RVW MEDS BY RX/DR IN RCRD: CPT | Mod: CPTII,S$GLB,, | Performed by: NURSE PRACTITIONER

## 2023-06-06 PROCEDURE — 99999 PR PBB SHADOW E&M-EST. PATIENT-LVL III: CPT | Mod: PBBFAC,,, | Performed by: NURSE PRACTITIONER

## 2023-06-06 PROCEDURE — 99396 PR PREVENTIVE VISIT,EST,40-64: ICD-10-PCS | Mod: S$GLB,,, | Performed by: NURSE PRACTITIONER

## 2023-06-06 PROCEDURE — 3075F PR MOST RECENT SYSTOLIC BLOOD PRESS GE 130-139MM HG: ICD-10-PCS | Mod: CPTII,S$GLB,, | Performed by: NURSE PRACTITIONER

## 2023-06-06 PROCEDURE — 3008F BODY MASS INDEX DOCD: CPT | Mod: CPTII,S$GLB,, | Performed by: NURSE PRACTITIONER

## 2023-06-06 PROCEDURE — 3079F PR MOST RECENT DIASTOLIC BLOOD PRESSURE 80-89 MM HG: ICD-10-PCS | Mod: CPTII,S$GLB,, | Performed by: NURSE PRACTITIONER

## 2023-06-06 PROCEDURE — 3075F SYST BP GE 130 - 139MM HG: CPT | Mod: CPTII,S$GLB,, | Performed by: NURSE PRACTITIONER

## 2023-06-06 PROCEDURE — 1160F PR REVIEW ALL MEDS BY PRESCRIBER/CLIN PHARMACIST DOCUMENTED: ICD-10-PCS | Mod: CPTII,S$GLB,, | Performed by: NURSE PRACTITIONER

## 2023-06-06 PROCEDURE — 80061 LIPID PANEL: CPT | Performed by: NURSE PRACTITIONER

## 2023-06-06 PROCEDURE — 4010F ACE/ARB THERAPY RXD/TAKEN: CPT | Mod: CPTII,S$GLB,, | Performed by: NURSE PRACTITIONER

## 2023-06-06 PROCEDURE — 3008F PR BODY MASS INDEX (BMI) DOCUMENTED: ICD-10-PCS | Mod: CPTII,S$GLB,, | Performed by: NURSE PRACTITIONER

## 2023-06-06 RX ORDER — IRBESARTAN 300 MG/1
300 TABLET ORAL NIGHTLY
Qty: 90 TABLET | Refills: 3 | Status: SHIPPED | OUTPATIENT
Start: 2023-06-06 | End: 2024-04-02

## 2023-06-06 RX ORDER — PANTOPRAZOLE SODIUM 40 MG/1
40 TABLET, DELAYED RELEASE ORAL DAILY
Qty: 90 TABLET | Refills: 3 | Status: SHIPPED | OUTPATIENT
Start: 2023-06-06

## 2023-06-06 RX ORDER — DESVENLAFAXINE SUCCINATE 50 MG/1
50 TABLET, EXTENDED RELEASE ORAL DAILY
Qty: 90 TABLET | Refills: 3 | Status: SHIPPED | OUTPATIENT
Start: 2023-06-06

## 2023-06-06 NOTE — PROGRESS NOTES
Subjective:       Patient ID: Danielle Alarcon is a 47 y.o. female.    Chief Complaint: Annual Exam    HPI    She comes in for routine annual wellness exam with fasting labs and medication refills     Problem List Items Addressed This Visit          Psychiatric    Anxiety    Overview     The patient has anxiety which is being treated with pristiq.  Treatment has been given since 11/2021.  This has controled the symptoms.  Things that makes it worse include stress and the current treatment makes it better.              Cardiac/Vascular    Essential hypertension    Overview     The patient presents with essential hypertension.  The patient is tolerating the medication well and is in excellent compliance.  The patient is experiencing no side effects.  Counseling was offered regarding low salt diets.  The patient has a reduced salt intake.  The patient denies chest pain, palpitations, shortness of breath, dyspnea on exertion, left or murmur neck pain, nausea, vomiting, diaphoresis, paroxysmal nocturnal dyspnea, and orthopnea.        Lab Results   Component Value Date     05/28/2022     12/29/2021    CREATININE 0.98 05/28/2022    CREATININE 1.0 12/29/2021    K 4.9 05/28/2022    K 4.4 12/29/2021              Relevant Orders    Lipid Panel    Comprehensive Metabolic Panel       GI    Gastroesophageal reflux disease without esophagitis    Overview     The patient presents with GERD.  Denies chest pain, nausea & vomiting, belching, cramping, distention, dyspepsia, dysphagia, hematochezia, melena, abdominal pain and weight loss.  Current treatment has included medications that are listed in medications list with significant response.  There has been no medicine intolerance.  The patient cannot identify any exacerbating factors.  Avoidance of NSAID's, ASA, carbonated beverages and spicy food was discussed.  She has tried going to every other day dosing and she did not tolerate this so she is taking it daily  now.           Relevant Medications    pantoprazole (PROTONIX) 40 MG tablet     Other Visit Diagnoses       Annual physical exam    -  Primary    Relevant Orders    Lipid Panel    Comprehensive Metabolic Panel    Encounter for screening mammogram for breast cancer        Relevant Orders    Mammo Digital Screening Bilat    Anxiety and depression        Relevant Medications    desvenlafaxine succinate (PRISTIQ) 50 MG Tb24             Review of Systems   Constitutional:  Positive for activity change. Negative for fatigue, fever and unexpected weight change.   HENT:  Negative for ear pain, hearing loss, rhinorrhea, sore throat and trouble swallowing.    Eyes:  Negative for pain, discharge and visual disturbance.   Respiratory:  Negative for cough, chest tightness, shortness of breath and wheezing.    Cardiovascular:  Negative for chest pain and palpitations.   Gastrointestinal:  Negative for abdominal pain, blood in stool, constipation, diarrhea and vomiting.   Endocrine: Negative for polydipsia and polyuria.   Genitourinary:  Negative for difficulty urinating, dysuria, hematuria and menstrual problem.   Musculoskeletal:  Negative for arthralgias, joint swelling, myalgias and neck pain.   Skin:  Negative for color change and rash.   Neurological:  Negative for dizziness, weakness and headaches.   Psychiatric/Behavioral:  Negative for confusion, dysphoric mood and sleep disturbance. The patient is not nervous/anxious.      Vitals:    06/06/23 0707   BP: 139/89   Pulse: 66   Temp: 98.2 °F (36.8 °C)       Objective:     Current Outpatient Medications   Medication Sig Dispense Refill    cetirizine HCl (ZYRTEC ORAL)       MAGNESIUM GLUCONATE ORAL Take 400 mg by mouth once.      multivitamin (THERAGRAN) per tablet Take 1 tablet by mouth once daily. HOLD AM OF SURGERY      potassium citrate (UROCIT-K) 10 mEq (1,080 mg) TbSR Take 1 tablet (10 mEq total) by mouth 3 (three) times daily with meals. Take 1 twice daily with meals to  help prevent stones. Ok to see in stool. 270 tablet 3    TURMERIC ORAL Take 3 capsules by mouth once daily. Hold until after surgery      desvenlafaxine succinate (PRISTIQ) 50 MG Tb24 Take 1 tablet (50 mg total) by mouth once daily. 90 tablet 3    irbesartan (AVAPRO) 300 MG tablet Take 1 tablet (300 mg total) by mouth every evening. 90 tablet 3    pantoprazole (PROTONIX) 40 MG tablet Take 1 tablet (40 mg total) by mouth once daily. 90 tablet 3     No current facility-administered medications for this visit.       Physical Exam  Vitals and nursing note reviewed.   Constitutional:       General: She is not in acute distress.     Appearance: She is well-developed.   HENT:      Head: Normocephalic and atraumatic.   Eyes:      Pupils: Pupils are equal, round, and reactive to light.   Neck:      Vascular: No carotid bruit.   Cardiovascular:      Rate and Rhythm: Normal rate and regular rhythm.   Pulmonary:      Effort: Pulmonary effort is normal.      Breath sounds: Normal breath sounds.   Musculoskeletal:         General: Normal range of motion.      Cervical back: Normal range of motion and neck supple.   Skin:     General: Skin is warm and dry.      Findings: No rash.   Neurological:      Mental Status: She is alert and oriented to person, place, and time.   Psychiatric:         Judgment: Judgment normal.       Assessment:       1. Annual physical exam    2. Essential hypertension    3. Anxiety    4. Gastroesophageal reflux disease without esophagitis    5. Encounter for screening mammogram for breast cancer    6. Anxiety and depression        Plan:   Annual physical exam  -     Lipid Panel; Future; Expected date: 06/06/2023  -     Comprehensive Metabolic Panel; Future; Expected date: 06/06/2023    Essential hypertension  -     Lipid Panel; Future; Expected date: 06/06/2023  -     Comprehensive Metabolic Panel; Future; Expected date: 06/06/2023    Anxiety    Gastroesophageal reflux disease without esophagitis  -      pantoprazole (PROTONIX) 40 MG tablet; Take 1 tablet (40 mg total) by mouth once daily.  Dispense: 90 tablet; Refill: 3    Encounter for screening mammogram for breast cancer  -     Mammo Digital Screening Bilat; Future; Expected date: 06/06/2023    Anxiety and depression  -     desvenlafaxine succinate (PRISTIQ) 50 MG Tb24; Take 1 tablet (50 mg total) by mouth once daily.  Dispense: 90 tablet; Refill: 3    Other orders  -     irbesartan (AVAPRO) 300 MG tablet; Take 1 tablet (300 mg total) by mouth every evening.  Dispense: 90 tablet; Refill: 3        No follow-ups on file.    There are no Patient Instructions on file for this visit.

## 2023-06-06 NOTE — LETTER
June 6, 2023      [facility]  [address] [address]  Phone: [phone]  Fax: [phone]       Patient: Danielle Alarcon   YOB: 1975  Date of Visit: 06/06/2023    To Whom It May Concern:      The patient was evaluated for anxiety and depression by Pedrito Rendon NP/ myself in our office on September 30, 2020. At that time the patient was started on Pristiq 50 mg once a day and has continued this without adverse effects.  There was no documentation of presence or absence of suicidal or homicidal ideation at the time but it was documented that the patient had a suicide attempt at the age of 17 that was treated with Prozac.  There is no intervening history since then that this has been an issue.  The patient had a follow-up with the same provider on November 6, 2020 and things had appeared to improve at that point.  The record reflects that on 04/27/2021 the patient messaged requesting a referral for [facility] in [place] which was approved by Pedrito Rendon NP on 04/28/2021.  The patient had a visit with Nadine Benitez PsyD on 5/11/2021.  At that time, there is documentation that the patient has no active suicidal ideation.  The patient is currently taking Pristiq 50 mg and there has been no report in the record that there has been any intolerance or that it has been causing any sedation.  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  I believe the nature and severity of the medical condition of the  does not in danger the health and safety of the public.    Sincerely,    Pedrito Rendon NP

## 2023-07-12 ENCOUNTER — HOSPITAL ENCOUNTER (OUTPATIENT)
Dept: RADIOLOGY | Facility: HOSPITAL | Age: 48
Discharge: HOME | End: 2023-07-12
Attending: NURSE PRACTITIONER
Payer: COMMERCIAL

## 2023-07-12 DIAGNOSIS — Z12.31 ENCOUNTER FOR SCREENING MAMMOGRAM FOR BREAST CANCER: ICD-10-CM

## 2023-07-12 PROCEDURE — 77067 SCR MAMMO BI INCL CAD: CPT | Mod: 26,,, | Performed by: RADIOLOGY

## 2023-07-12 PROCEDURE — 77067 MAMMO DIGITAL SCREENING BILAT WITH TOMO: ICD-10-PCS | Mod: 26,,, | Performed by: RADIOLOGY

## 2023-07-12 PROCEDURE — 77063 MAMMO DIGITAL SCREENING BILAT WITH TOMO: ICD-10-PCS | Mod: 26,,, | Performed by: RADIOLOGY

## 2023-07-12 PROCEDURE — 77063 BREAST TOMOSYNTHESIS BI: CPT | Mod: 26,,, | Performed by: RADIOLOGY

## 2023-07-12 PROCEDURE — 77067 SCR MAMMO BI INCL CAD: CPT | Mod: TC,PO

## 2023-12-06 ENCOUNTER — TELEPHONE (OUTPATIENT)
Dept: UROLOGY | Facility: CLINIC | Age: 48
End: 2023-12-06
Payer: COMMERCIAL

## 2023-12-06 RX ORDER — POTASSIUM CITRATE 10 MEQ/1
10 TABLET, EXTENDED RELEASE ORAL
Qty: 270 TABLET | Refills: 3 | Status: SHIPPED | OUTPATIENT
Start: 2023-12-06 | End: 2024-12-05

## 2023-12-06 NOTE — TELEPHONE ENCOUNTER
Spoke with pharmacy clarified potassium prescription per 's note patient to take three times daily. Pharmacy verbally voiced understanding.

## 2023-12-06 NOTE — TELEPHONE ENCOUNTER
----- Message from Daphne Bird sent at 12/6/2023  2:49 PM CST -----  Contact: Aranza  Type:  Pharmacy Calling to Clarify an RX    Name of Caller:Aranza    Pharmacy Name:[facility]     Prescription Name: potassium citrate (UROCIT-K) 10 mEq (1,080 mg) TbSR    What do they need to clarify?:question- 2 different directions     Best Call Back Number: [phone]    Additional Information:

## 2023-12-06 NOTE — TELEPHONE ENCOUNTER
This patient has not been seen by urology in over a year.      A prescription was refilled for potassium citrate 90 days with NO refills but the patient will need a one year FOLLOW UP with me or urology nurse practitioner (whomever has first availability).    Patient can ask their pcp to refill until then.     Please see their last note and see if they needed labs and/or imaging and confirm the patient was not discharged from our clinic back to their pcp.

## 2024-04-02 RX ORDER — IRBESARTAN 300 MG/1
300 TABLET ORAL NIGHTLY
Qty: 90 TABLET | Refills: 0 | Status: SHIPPED | OUTPATIENT
Start: 2024-04-02 | End: 2024-06-17 | Stop reason: SDUPTHER

## 2024-04-02 NOTE — TELEPHONE ENCOUNTER
Refill Routing Note   Medication(s) are not appropriate for processing by [facility] for the following reason(s):        Non-participating provider    [facility] action(s):  Route               Appointments  past 12m or future 3m with PCP    Date Provider   Last Visit   6/6/2023 Pedrito Rendon, NP   Next Visit   6/7/2024 Pedrito Rendon, NP   ED visits in past 90 days: 0        Note composed:11:04 AM 04/02/2024

## 2024-05-06 DIAGNOSIS — F32.A ANXIETY AND DEPRESSION: ICD-10-CM

## 2024-05-06 DIAGNOSIS — F41.9 ANXIETY AND DEPRESSION: ICD-10-CM

## 2024-05-07 RX ORDER — DESVENLAFAXINE SUCCINATE 50 MG/1
50 TABLET, EXTENDED RELEASE ORAL
Qty: 30 TABLET | Refills: 0 | Status: SHIPPED | OUTPATIENT
Start: 2024-05-07 | End: 2024-06-17 | Stop reason: SDUPTHER

## 2024-05-07 NOTE — TELEPHONE ENCOUNTER
Refill Routing Note   Medication(s) are not appropriate for processing by [facility] for the following reason(s):        Non-participating provider:     [facility] action(s):  Route      Medication Therapy Plan:         Appointments  past 12m or future 3m with PCP    Date Provider   Last Visit   6/6/2023 Pedrito Rendon, NP   Next Visit   6/7/2024 Pedrito Rendon, NP   ED visits in past 90 days: 0        Note composed:7:15 PM 05/06/2024

## 2024-06-17 ENCOUNTER — OFFICE VISIT (OUTPATIENT)
Dept: FAMILY MEDICINE | Facility: CLINIC | Age: 49
End: 2024-06-17
Payer: COMMERCIAL

## 2024-06-17 ENCOUNTER — LAB VISIT (OUTPATIENT)
Dept: LAB | Facility: HOSPITAL | Age: 49
End: 2024-06-17
Attending: NURSE PRACTITIONER
Payer: COMMERCIAL

## 2024-06-17 VITALS
SYSTOLIC BLOOD PRESSURE: 129 MMHG | HEIGHT: 67 IN | HEART RATE: 65 BPM | BODY MASS INDEX: 38.61 KG/M2 | DIASTOLIC BLOOD PRESSURE: 74 MMHG | WEIGHT: 246 LBS

## 2024-06-17 DIAGNOSIS — Z00.00 ANNUAL PHYSICAL EXAM: Primary | ICD-10-CM

## 2024-06-17 DIAGNOSIS — F41.9 ANXIETY: ICD-10-CM

## 2024-06-17 DIAGNOSIS — F41.9 ANXIETY AND DEPRESSION: ICD-10-CM

## 2024-06-17 DIAGNOSIS — D50.0 IRON DEFICIENCY ANEMIA DUE TO CHRONIC BLOOD LOSS: ICD-10-CM

## 2024-06-17 DIAGNOSIS — F32.A ANXIETY AND DEPRESSION: ICD-10-CM

## 2024-06-17 DIAGNOSIS — Z00.00 ANNUAL PHYSICAL EXAM: ICD-10-CM

## 2024-06-17 DIAGNOSIS — I10 ESSENTIAL HYPERTENSION: ICD-10-CM

## 2024-06-17 DIAGNOSIS — Z23 IMMUNIZATION DUE: ICD-10-CM

## 2024-06-17 DIAGNOSIS — K21.9 GASTROESOPHAGEAL REFLUX DISEASE WITHOUT ESOPHAGITIS: ICD-10-CM

## 2024-06-17 LAB
ALBUMIN SERPL BCP-MCNC: 3.7 G/DL (ref 3.5–5.2)
ALP SERPL-CCNC: 105 U/L (ref 55–135)
ALT SERPL W/O P-5'-P-CCNC: 21 U/L (ref 10–44)
ANION GAP SERPL CALC-SCNC: 6 MMOL/L (ref 8–16)
AST SERPL-CCNC: 19 U/L (ref 10–40)
BASOPHILS # BLD AUTO: 0.05 K/UL (ref 0–0.2)
BASOPHILS NFR BLD: 0.6 % (ref 0–1.9)
BILIRUB SERPL-MCNC: 0.4 MG/DL (ref 0.1–1)
BUN SERPL-MCNC: 13 MG/DL (ref 6–20)
CALCIUM SERPL-MCNC: 9.2 MG/DL (ref 8.7–10.5)
CHLORIDE SERPL-SCNC: 108 MMOL/L (ref 95–110)
CHOLEST SERPL-MCNC: 175 MG/DL (ref 120–199)
CHOLEST/HDLC SERPL: 3.4 {RATIO} (ref 2–5)
CO2 SERPL-SCNC: 26 MMOL/L (ref 23–29)
CREAT SERPL-MCNC: 1 MG/DL (ref 0.5–1.4)
DIFFERENTIAL METHOD BLD: ABNORMAL
EOSINOPHIL # BLD AUTO: 0.3 K/UL (ref 0–0.5)
EOSINOPHIL NFR BLD: 3.4 % (ref 0–8)
ERYTHROCYTE [DISTWIDTH] IN BLOOD BY AUTOMATED COUNT: 13.8 % (ref 11.5–14.5)
EST. GFR  (NO RACE VARIABLE): >60 ML/MIN/1.73 M^2
GLUCOSE SERPL-MCNC: 109 MG/DL (ref 70–110)
HCT VFR BLD AUTO: 39.9 % (ref 37–48.5)
HDLC SERPL-MCNC: 52 MG/DL (ref 40–75)
HDLC SERPL: 29.7 % (ref 20–50)
HGB BLD-MCNC: 12.8 G/DL (ref 12–16)
IMM GRANULOCYTES # BLD AUTO: 0.08 K/UL (ref 0–0.04)
IMM GRANULOCYTES NFR BLD AUTO: 1 % (ref 0–0.5)
LDLC SERPL CALC-MCNC: 110 MG/DL (ref 63–159)
LYMPHOCYTES # BLD AUTO: 2.4 K/UL (ref 1–4.8)
LYMPHOCYTES NFR BLD: 30.1 % (ref 18–48)
MAGNESIUM SERPL-MCNC: 2 MG/DL (ref 1.6–2.6)
MCH RBC QN AUTO: 29.4 PG (ref 27–31)
MCHC RBC AUTO-ENTMCNC: 32.1 G/DL (ref 32–36)
MCV RBC AUTO: 92 FL (ref 82–98)
MONOCYTES # BLD AUTO: 0.3 K/UL (ref 0.3–1)
MONOCYTES NFR BLD: 3.9 % (ref 4–15)
NEUTROPHILS # BLD AUTO: 4.9 K/UL (ref 1.8–7.7)
NEUTROPHILS NFR BLD: 61 % (ref 38–73)
NONHDLC SERPL-MCNC: 123 MG/DL
NRBC BLD-RTO: 0 /100 WBC
PLATELET # BLD AUTO: 170 K/UL (ref 150–450)
PMV BLD AUTO: 11.4 FL (ref 9.2–12.9)
POTASSIUM SERPL-SCNC: 4.8 MMOL/L (ref 3.5–5.1)
PROT SERPL-MCNC: 6.7 G/DL (ref 6–8.4)
RBC # BLD AUTO: 4.35 M/UL (ref 4–5.4)
SODIUM SERPL-SCNC: 140 MMOL/L (ref 136–145)
TRIGL SERPL-MCNC: 65 MG/DL (ref 30–150)
WBC # BLD AUTO: 8.03 K/UL (ref 3.9–12.7)

## 2024-06-17 PROCEDURE — 3008F BODY MASS INDEX DOCD: CPT | Mod: CPTII,S$GLB,, | Performed by: NURSE PRACTITIONER

## 2024-06-17 PROCEDURE — 90750 HZV VACC RECOMBINANT IM: CPT | Mod: S$GLB,,, | Performed by: NURSE PRACTITIONER

## 2024-06-17 PROCEDURE — 4010F ACE/ARB THERAPY RXD/TAKEN: CPT | Mod: CPTII,S$GLB,, | Performed by: NURSE PRACTITIONER

## 2024-06-17 PROCEDURE — 85025 COMPLETE CBC W/AUTO DIFF WBC: CPT | Performed by: NURSE PRACTITIONER

## 2024-06-17 PROCEDURE — 80053 COMPREHEN METABOLIC PANEL: CPT | Performed by: NURSE PRACTITIONER

## 2024-06-17 PROCEDURE — 3078F DIAST BP <80 MM HG: CPT | Mod: CPTII,S$GLB,, | Performed by: NURSE PRACTITIONER

## 2024-06-17 PROCEDURE — 80061 LIPID PANEL: CPT | Performed by: NURSE PRACTITIONER

## 2024-06-17 PROCEDURE — 36415 COLL VENOUS BLD VENIPUNCTURE: CPT | Mod: PO | Performed by: NURSE PRACTITIONER

## 2024-06-17 PROCEDURE — 1159F MED LIST DOCD IN RCRD: CPT | Mod: CPTII,S$GLB,, | Performed by: NURSE PRACTITIONER

## 2024-06-17 PROCEDURE — 90471 IMMUNIZATION ADMIN: CPT | Mod: S$GLB,,, | Performed by: NURSE PRACTITIONER

## 2024-06-17 PROCEDURE — 83735 ASSAY OF MAGNESIUM: CPT | Performed by: NURSE PRACTITIONER

## 2024-06-17 PROCEDURE — 3074F SYST BP LT 130 MM HG: CPT | Mod: CPTII,S$GLB,, | Performed by: NURSE PRACTITIONER

## 2024-06-17 PROCEDURE — 99999 PR PBB SHADOW E&M-EST. PATIENT-LVL IV: CPT | Mod: PBBFAC,,, | Performed by: NURSE PRACTITIONER

## 2024-06-17 PROCEDURE — 99396 PREV VISIT EST AGE 40-64: CPT | Mod: 25,S$GLB,, | Performed by: NURSE PRACTITIONER

## 2024-06-17 RX ORDER — DESVENLAFAXINE SUCCINATE 50 MG/1
50 TABLET, EXTENDED RELEASE ORAL DAILY
Qty: 90 TABLET | Refills: 3 | Status: SHIPPED | OUTPATIENT
Start: 2024-06-17

## 2024-06-17 RX ORDER — IRBESARTAN 300 MG/1
300 TABLET ORAL NIGHTLY
Qty: 90 TABLET | Refills: 3 | Status: SHIPPED | OUTPATIENT
Start: 2024-06-17

## 2024-06-17 RX ORDER — PANTOPRAZOLE SODIUM 40 MG/1
40 TABLET, DELAYED RELEASE ORAL DAILY
Qty: 90 TABLET | Refills: 3 | Status: SHIPPED | OUTPATIENT
Start: 2024-06-17

## 2024-06-17 NOTE — PROGRESS NOTES
Subjective:       Patient ID: Danielle Alarcon is a 48 y.o. female.    Chief Complaint: Annual Exam    HPI    She comes in for routine annual wellness exam with fasting labs and medication refills     Problem List Items Addressed This Visit          Psychiatric    Anxiety    Overview     The patient has anxiety which is being treated with pristiq.  Treatment has been given since 11/2021.  This has controled the symptoms.  Things that makes it worse include stress and the current treatment makes it better.           Relevant Medications    desvenlafaxine succinate (PRISTIQ) 50 MG Tb24       Cardiac/Vascular    Essential hypertension    Overview     The patient presents with essential hypertension.  The patient is tolerating the medication well and is in excellent compliance.  The patient is experiencing no side effects.  Counseling was offered regarding low salt diets.  The patient has a reduced salt intake.  The patient denies chest pain, palpitations, shortness of breath, dyspnea on exertion, left or murmur neck pain, nausea, vomiting, diaphoresis, paroxysmal nocturnal dyspnea, and orthopnea.        Lab Results   Component Value Date     06/06/2023     05/28/2022    CREATININE 1.0 06/06/2023    CREATININE 0.98 05/28/2022    K 4.5 06/06/2023    K 4.9 05/28/2022              Relevant Medications    irbesartan (AVAPRO) 300 MG tablet    Other Relevant Orders    Comprehensive Metabolic Panel    Lipid Panel    CBC Auto Differential    Magnesium       Oncology    Iron deficiency anemia due to chronic blood loss    Overview     The patient has had a CBC which showed anemia. This has improved over times. She does donate blood.  She has had times that her blood was too low to donate.  She is on protonix for GERD.  She is not hurting in the abdomen and has not seen black or red stools.  She has had a hysterectomy.    Lab Results   Component Value Date    WBC 6.4 05/28/2022    HGB 13.2 05/28/2022    HCT 39.8  05/28/2022    MCV 90.0 05/28/2022     05/28/2022     The iron saturation was low at 13.  Lab Results   Component Value Date    IRON 96 05/28/2022    TIBC 306 05/28/2022     No visits with results within 2 Week(s) from this visit.   Latest known visit with results is:   Lab Visit on 06/06/2023   Component Date Value Ref Range Status    Cholesterol 06/06/2023 183  120 - 199 mg/dL Final    Comment: The National Cholesterol Education Program (NCEP) has set the  following guidelines (reference ranges) for Cholesterol:  Optimal.....................<200 mg/dL  Borderline High.............200-239 mg/dL  High........................> or = 240 mg/dL      Triglycerides 06/06/2023 64  30 - 150 mg/dL Final    Comment: The National Cholesterol Education Program (NCEP) has set the  following guidelines (reference values) for triglycerides:  Normal......................<150 mg/dL  Borderline High.............150-199 mg/dL  High........................200-499 mg/dL      HDL 06/06/2023 67  40 - 75 mg/dL Final    Comment: The National Cholesterol Education Program (NCEP) has set the  following guidelines (reference values) for HDL Cholesterol:  Low...............<40 mg/dL  Optimal...........>60 mg/dL      LDL Cholesterol 06/06/2023 103.2  63.0 - 159.0 mg/dL Final    Comment: The National Cholesterol Education Program (NCEP) has set the  following guidelines (reference values) for LDL Cholesterol:  Optimal.......................<130 mg/dL  Borderline High...............130-159 mg/dL  High..........................160-189 mg/dL  Very High.....................>190 mg/dL      HDL/Cholesterol Ratio 06/06/2023 36.6  20.0 - 50.0 % Final    Total Cholesterol/HDL Ratio 06/06/2023 2.7  2.0 - 5.0 Final    Non-HDL Cholesterol 06/06/2023 116  mg/dL Final    Comment: Risk category and Non-HDL cholesterol goals:  Coronary heart disease (CHD)or equivalent (10-year risk of CHD >20%):  Non-HDL cholesterol goal     <130 mg/dL  Two or more CHD  risk factors and 10-year risk of CHD <= 20%:  Non-HDL cholesterol goal     <160 mg/dL  0 to 1 CHD risk factor:  Non-HDL cholesterol goal     <190 mg/dL      Sodium 06/06/2023 141  136 - 145 mmol/L Final    Potassium 06/06/2023 4.5  3.5 - 5.1 mmol/L Final    Chloride 06/06/2023 105  95 - 110 mmol/L Final    CO2 06/06/2023 29  23 - 29 mmol/L Final    Glucose 06/06/2023 104  70 - 110 mg/dL Final    BUN 06/06/2023 16  6 - 20 mg/dL Final    Creatinine 06/06/2023 1.0  0.5 - 1.4 mg/dL Final    Calcium 06/06/2023 9.3  8.7 - 10.5 mg/dL Final    Total Protein 06/06/2023 6.4  6.0 - 8.4 g/dL Final    Albumin 06/06/2023 3.7  3.5 - 5.2 g/dL Final    Total Bilirubin 06/06/2023 0.5  0.1 - 1.0 mg/dL Final    Comment: For infants and newborns, interpretation of results should be based  on gestational age, weight and in agreement with clinical  observations.    Premature Infant recommended reference ranges:  Up to 24 hours.............<8.0 mg/dL  Up to 48 hours............<12.0 mg/dL  3-5 days..................<15.0 mg/dL  6-29 days.................<15.0 mg/dL      Alkaline Phosphatase 06/06/2023 107  55 - 135 U/L Final    AST 06/06/2023 21  10 - 40 U/L Final    ALT 06/06/2023 20  10 - 44 U/L Final    Anion Gap 06/06/2023 7 (L)  8 - 16 mmol/L Final    eGFR 06/06/2023 >60.0  >60 mL/min/1.73 m^2 Final            Relevant Orders    Magnesium       GI    Gastroesophageal reflux disease without esophagitis    Overview     The patient presents with GERD.  Denies chest pain, nausea & vomiting, belching, cramping, distention, dyspepsia, dysphagia, hematochezia, melena, abdominal pain and weight loss.  Current treatment has included medications that are listed in medications list with significant response.  There has been no medicine intolerance.  The patient cannot identify any exacerbating factors.  Avoidance of NSAID's, ASA, carbonated beverages and spicy food was discussed.  She has tried going to every other day dosing and she did not  tolerate this so she is taking it daily now.           Relevant Medications    pantoprazole (PROTONIX) 40 MG tablet     Other Visit Diagnoses       Annual physical exam    -  Primary    Relevant Orders    Comprehensive Metabolic Panel    Lipid Panel    CBC Auto Differential    Magnesium    Immunization due        Relevant Medications    varicella-zoster GE-AS01B (PF)(SHINGRIX) 50 mcg/0.5 mL KIT (Completed)    Anxiety and depression        Relevant Medications    desvenlafaxine succinate (PRISTIQ) 50 MG Tb24             Review of Systems   Constitutional:  Positive for activity change. Negative for fatigue, fever and unexpected weight change.   HENT:  Negative for ear pain, hearing loss, sore throat and trouble swallowing.    Eyes:  Negative for pain, discharge and visual disturbance.   Respiratory:  Negative for cough, chest tightness, shortness of breath and wheezing.    Cardiovascular:  Negative for chest pain and palpitations.   Gastrointestinal:  Negative for abdominal pain, constipation, diarrhea and vomiting.   Genitourinary:  Negative for difficulty urinating and hematuria.   Musculoskeletal:  Negative for arthralgias and myalgias.   Skin:  Negative for color change and rash.   Neurological:  Negative for dizziness and headaches.   Psychiatric/Behavioral:  Negative for dysphoric mood and sleep disturbance. The patient is not nervous/anxious.        Vitals:    06/17/24 0735   BP: 129/74   Pulse: 65       Objective:     Current Outpatient Medications   Medication Sig Dispense Refill    cetirizine HCl (ZYRTEC ORAL)       MAGNESIUM GLUCONATE ORAL Take 400 mg by mouth once.      multivitamin (THERAGRAN) per tablet Take 1 tablet by mouth once daily. HOLD AM OF SURGERY      TURMERIC ORAL Take 3 capsules by mouth once daily. Hold until after surgery      desvenlafaxine succinate (PRISTIQ) 50 MG Tb24 Take 1 tablet (50 mg total) by mouth once daily. 90 tablet 3    irbesartan (AVAPRO) 300 MG tablet Take 1 tablet (300 mg  total) by mouth every evening. 90 tablet 3    pantoprazole (PROTONIX) 40 MG tablet Take 1 tablet (40 mg total) by mouth once daily. 90 tablet 3     No current facility-administered medications for this visit.       Physical Exam  Vitals and nursing note reviewed.   Constitutional:       General: She is not in acute distress.     Appearance: She is well-developed. She is obese.   HENT:      Head: Normocephalic and atraumatic.   Eyes:      Pupils: Pupils are equal, round, and reactive to light.   Neck:      Vascular: No carotid bruit.   Cardiovascular:      Rate and Rhythm: Normal rate and regular rhythm.   Pulmonary:      Effort: Pulmonary effort is normal.      Breath sounds: Normal breath sounds.   Musculoskeletal:         General: Normal range of motion.      Cervical back: Normal range of motion and neck supple.   Skin:     General: Skin is warm and dry.      Findings: No rash.   Neurological:      Mental Status: She is alert and oriented to person, place, and time.   Psychiatric:         Judgment: Judgment normal.         Assessment:       1. Annual physical exam    2. Essential hypertension    3. Anxiety    4. Gastroesophageal reflux disease without esophagitis    5. Iron deficiency anemia due to chronic blood loss    6. Immunization due    7. Anxiety and depression        Plan:   Annual physical exam  -     Comprehensive Metabolic Panel; Future; Expected date: 06/17/2024  -     Lipid Panel; Future; Expected date: 06/17/2024  -     CBC Auto Differential; Future; Expected date: 06/17/2024  -     Magnesium; Future; Expected date: 06/17/2024    Essential hypertension  -     Comprehensive Metabolic Panel; Future; Expected date: 06/17/2024  -     Lipid Panel; Future; Expected date: 06/17/2024  -     CBC Auto Differential; Future; Expected date: 06/17/2024  -     Magnesium; Future; Expected date: 06/17/2024  -     irbesartan (AVAPRO) 300 MG tablet; Take 1 tablet (300 mg total) by mouth every evening.  Dispense: 90  tablet; Refill: 3    Anxiety  -     desvenlafaxine succinate (PRISTIQ) 50 MG Tb24; Take 1 tablet (50 mg total) by mouth once daily.  Dispense: 90 tablet; Refill: 3    Gastroesophageal reflux disease without esophagitis  -     pantoprazole (PROTONIX) 40 MG tablet; Take 1 tablet (40 mg total) by mouth once daily.  Dispense: 90 tablet; Refill: 3    Iron deficiency anemia due to chronic blood loss  -     Magnesium; Future; Expected date: 06/17/2024    Immunization due  -     varicella-zoster GE-AS01B (PF)(SHINGRIX) 50 mcg/0.5 mL KIT    Anxiety and depression  -     desvenlafaxine succinate (PRISTIQ) 50 MG Tb24; Take 1 tablet (50 mg total) by mouth once daily.  Dispense: 90 tablet; Refill: 3        Follow up if symptoms worsen or fail to improve.    There are no Patient Instructions on file for this visit.

## 2024-09-11 ENCOUNTER — PATIENT MESSAGE (OUTPATIENT)
Dept: FAMILY MEDICINE | Facility: CLINIC | Age: 49
End: 2024-09-11
Payer: COMMERCIAL

## 2024-12-18 ENCOUNTER — OFFICE VISIT (OUTPATIENT)
Dept: OTOLARYNGOLOGY | Facility: CLINIC | Age: 49
End: 2024-12-18
Payer: COMMERCIAL

## 2024-12-18 VITALS — WEIGHT: 241.38 LBS | BODY MASS INDEX: 37.81 KG/M2

## 2024-12-18 DIAGNOSIS — H93.8X3 SENSATION OF FULLNESS IN EAR, BILATERAL: ICD-10-CM

## 2024-12-18 DIAGNOSIS — H61.23 HEARING LOSS DUE TO CERUMEN IMPACTION, BILATERAL: Primary | ICD-10-CM

## 2024-12-18 DIAGNOSIS — H61.23 BILATERAL IMPACTED CERUMEN: ICD-10-CM

## 2024-12-18 PROCEDURE — 1159F MED LIST DOCD IN RCRD: CPT | Mod: CPTII,S$GLB,, | Performed by: NURSE PRACTITIONER

## 2024-12-18 PROCEDURE — 99203 OFFICE O/P NEW LOW 30 MIN: CPT | Mod: 25,S$GLB,, | Performed by: NURSE PRACTITIONER

## 2024-12-18 PROCEDURE — 3008F BODY MASS INDEX DOCD: CPT | Mod: CPTII,S$GLB,, | Performed by: NURSE PRACTITIONER

## 2024-12-18 PROCEDURE — 1160F RVW MEDS BY RX/DR IN RCRD: CPT | Mod: CPTII,S$GLB,, | Performed by: NURSE PRACTITIONER

## 2024-12-18 PROCEDURE — 4010F ACE/ARB THERAPY RXD/TAKEN: CPT | Mod: CPTII,S$GLB,, | Performed by: NURSE PRACTITIONER

## 2024-12-18 PROCEDURE — 69210 REMOVE IMPACTED EAR WAX UNI: CPT | Mod: 50,S$GLB,, | Performed by: NURSE PRACTITIONER

## 2024-12-18 PROCEDURE — 99999 PR PBB SHADOW E&M-EST. PATIENT-LVL III: CPT | Mod: PBBFAC,,, | Performed by: NURSE PRACTITIONER

## 2024-12-18 NOTE — PROGRESS NOTES
Subjective     Patient ID: Danielle Alarcon is a 49 y.o. female.    Chief Complaint: Ear Fullness (Pt states there is some fullness and it might just be wax, she tends to get a lot of water)    HPI  Patient is new to ENT, self-referred for aural congestion. Patient states water getting stuck in ears for past few months AU, AS>AD, making her ears feel plugged and muffled hearing. She tried OTC gtts which made it worse. Denies otalgia or otorrhea. Had aural lavage done many years ago, nothing since.     Review of Systems   Constitutional: Negative.    HENT:  Positive for hearing loss.    Eyes: Negative.    Respiratory: Negative.     Cardiovascular: Negative.    Gastrointestinal: Negative.    Musculoskeletal: Negative.    Integumentary:  Negative.   Neurological: Negative.    Hematological: Negative.    Psychiatric/Behavioral: Negative.            Objective     Physical Exam  Vitals and nursing note reviewed.   Constitutional:       General: She is not in acute distress.     Appearance: She is well-developed. She is not ill-appearing.   HENT:      Head: Normocephalic and atraumatic.      Right Ear: Hearing, tympanic membrane, ear canal and external ear normal. No middle ear effusion. Tympanic membrane is not erythematous.      Left Ear: Hearing, tympanic membrane, ear canal and external ear normal.  No middle ear effusion. Tympanic membrane is not erythematous.      Nose: Nose normal.   Eyes:      General: Lids are normal. No scleral icterus.        Right eye: No discharge.         Left eye: No discharge.   Neck:      Trachea: Trachea normal. No tracheal deviation.   Cardiovascular:      Rate and Rhythm: Normal rate.   Pulmonary:      Effort: Pulmonary effort is normal. No respiratory distress.      Breath sounds: No stridor. No wheezing.   Musculoskeletal:         General: Normal range of motion.      Cervical back: Normal range of motion and neck supple.   Skin:     General: Skin is warm and dry.   Neurological:       Mental Status: She is alert and oriented to person, place, and time.      Coordination: Coordination normal.      Gait: Gait normal.   Psychiatric:         Attention and Perception: Attention normal.         Mood and Affect: Mood normal.         Speech: Speech normal.         Behavior: Behavior normal. Behavior is cooperative.     SEPARATE PROCEDURE IN OFFICE:   Procedure: Removal of impacted cerumen, bilateral   Pre Procedure Diagnosis: Cerumen Impaction   Post Procedure Diagnosis: Cerumen Impaction   Verbal informed consent in regards to risk of trauma to ear canal, ear drum or hearing, discomfort during procedure and/or inability to remove cerumen impaction in one session or unforeseen events or complications.   No anesthesia.     Procedure in detail:   Ear canal visualized bilateral with appropriate size ear speculum utilizing Operating Head Binocular Otomicroscope   Utilizing the following:  Ring curet, delicate alligator forceps, and/or suction cannula was used. The impacted cerumen of the ear canals was removed atraumatically. The TM and EAC were then inspected and found to be clear of wax. See description of TMs/EACs in PE above.   Complications: No   Condition: Improved/Good         Assessment and Plan     1. Hearing loss due to cerumen impaction, bilateral    2. Sensation of fullness in ear, bilateral    3. Bilateral impacted cerumen      Debrox weekly.    Patient encouraged to return to clinic if symptoms worsen/persist and as needed for further ENT symptoms or concerns.          No follow-ups on file.

## 2024-12-25 ENCOUNTER — PATIENT MESSAGE (OUTPATIENT)
Dept: FAMILY MEDICINE | Facility: CLINIC | Age: 49
End: 2024-12-25
Payer: COMMERCIAL

## 2025-01-27 ENCOUNTER — TELEPHONE (OUTPATIENT)
Dept: FAMILY MEDICINE | Facility: CLINIC | Age: 50
End: 2025-01-27
Payer: COMMERCIAL

## 2025-01-27 DIAGNOSIS — Z23 IMMUNIZATION DUE: Primary | ICD-10-CM

## 2025-01-27 NOTE — TELEPHONE ENCOUNTER
I have signed for the following orders AND/OR meds.  Please call the patient and ask the patient to schedule the testing AND/OR inform about any medications that were sent.      No orders of the defined types were placed in this encounter.      Medications Ordered This Encounter   Medications    varicella-zoster gE vac,2 of 2 SusR 0.5 mL

## 2025-01-31 ENCOUNTER — CLINICAL SUPPORT (OUTPATIENT)
Dept: FAMILY MEDICINE | Facility: CLINIC | Age: 50
End: 2025-01-31
Payer: COMMERCIAL

## 2025-01-31 ENCOUNTER — HOSPITAL ENCOUNTER (OUTPATIENT)
Dept: RADIOLOGY | Facility: HOSPITAL | Age: 50
Discharge: HOME | End: 2025-01-31
Attending: FAMILY MEDICINE
Payer: COMMERCIAL

## 2025-01-31 DIAGNOSIS — Z23 IMMUNIZATION DUE: Primary | ICD-10-CM

## 2025-01-31 DIAGNOSIS — Z12.31 ENCOUNTER FOR SCREENING MAMMOGRAM FOR BREAST CANCER: ICD-10-CM

## 2025-01-31 PROCEDURE — 77063 BREAST TOMOSYNTHESIS BI: CPT | Mod: 26,,, | Performed by: RADIOLOGY

## 2025-01-31 PROCEDURE — 90750 HZV VACC RECOMBINANT IM: CPT | Mod: S$GLB,,, | Performed by: FAMILY MEDICINE

## 2025-01-31 PROCEDURE — 77067 SCR MAMMO BI INCL CAD: CPT | Mod: 26,,, | Performed by: RADIOLOGY

## 2025-01-31 PROCEDURE — 77067 SCR MAMMO BI INCL CAD: CPT | Mod: TC,PO

## 2025-01-31 PROCEDURE — 99999 PR PBB SHADOW E&M-EST. PATIENT-LVL II: CPT | Mod: PBBFAC,,,

## 2025-01-31 PROCEDURE — 90471 IMMUNIZATION ADMIN: CPT | Mod: S$GLB,,, | Performed by: FAMILY MEDICINE

## 2025-01-31 NOTE — PROGRESS NOTES
Administered 0.5 mL to pt's Right deltoid. Pt tolerated well, advised to wait 15 minutes in clinic.

## 2025-06-19 ENCOUNTER — LAB VISIT (OUTPATIENT)
Dept: LAB | Facility: HOSPITAL | Age: 50
End: 2025-06-19
Attending: PHYSICIAN ASSISTANT
Payer: COMMERCIAL

## 2025-06-19 ENCOUNTER — OFFICE VISIT (OUTPATIENT)
Dept: FAMILY MEDICINE | Facility: CLINIC | Age: 50
End: 2025-06-19
Payer: COMMERCIAL

## 2025-06-19 VITALS
WEIGHT: 241.5 LBS | HEART RATE: 68 BPM | HEIGHT: 67 IN | BODY MASS INDEX: 37.9 KG/M2 | SYSTOLIC BLOOD PRESSURE: 136 MMHG | RESPIRATION RATE: 18 BRPM | DIASTOLIC BLOOD PRESSURE: 86 MMHG

## 2025-06-19 DIAGNOSIS — F41.9 ANXIETY: ICD-10-CM

## 2025-06-19 DIAGNOSIS — Z13.220 ENCOUNTER FOR LIPID SCREENING FOR CARDIOVASCULAR DISEASE: ICD-10-CM

## 2025-06-19 DIAGNOSIS — E66.01 SEVERE OBESITY (BMI 35.0-39.9) WITH COMORBIDITY: ICD-10-CM

## 2025-06-19 DIAGNOSIS — I10 ESSENTIAL HYPERTENSION: ICD-10-CM

## 2025-06-19 DIAGNOSIS — Z13.1 DIABETES MELLITUS SCREENING: ICD-10-CM

## 2025-06-19 DIAGNOSIS — Z00.00 ENCOUNTER FOR ANNUAL HEALTH EXAMINATION: ICD-10-CM

## 2025-06-19 DIAGNOSIS — K21.9 GASTROESOPHAGEAL REFLUX DISEASE WITHOUT ESOPHAGITIS: ICD-10-CM

## 2025-06-19 DIAGNOSIS — D50.0 IRON DEFICIENCY ANEMIA DUE TO CHRONIC BLOOD LOSS: ICD-10-CM

## 2025-06-19 DIAGNOSIS — Z00.00 ENCOUNTER FOR ANNUAL HEALTH EXAMINATION: Primary | ICD-10-CM

## 2025-06-19 DIAGNOSIS — Z13.6 ENCOUNTER FOR LIPID SCREENING FOR CARDIOVASCULAR DISEASE: ICD-10-CM

## 2025-06-19 PROBLEM — R20.0 NUMBNESS AND TINGLING IN BOTH HANDS: Status: RESOLVED | Noted: 2022-05-04 | Resolved: 2025-06-19

## 2025-06-19 PROBLEM — R20.2 NUMBNESS AND TINGLING IN BOTH HANDS: Status: RESOLVED | Noted: 2022-05-04 | Resolved: 2025-06-19

## 2025-06-19 LAB
ABSOLUTE EOSINOPHIL (OHS): 0.42 K/UL
ABSOLUTE MONOCYTE (OHS): 0.37 K/UL (ref 0.3–1)
ABSOLUTE NEUTROPHIL COUNT (OHS): 5.29 K/UL (ref 1.8–7.7)
ALBUMIN SERPL BCP-MCNC: 3.9 G/DL (ref 3.5–5.2)
ALP SERPL-CCNC: 103 UNIT/L (ref 40–150)
ALT SERPL W/O P-5'-P-CCNC: 20 UNIT/L (ref 10–44)
ANION GAP (OHS): 10 MMOL/L (ref 8–16)
AST SERPL-CCNC: 19 UNIT/L (ref 11–45)
BASOPHILS # BLD AUTO: 0.07 K/UL
BASOPHILS NFR BLD AUTO: 0.8 %
BILIRUB SERPL-MCNC: 0.4 MG/DL (ref 0.1–1)
BUN SERPL-MCNC: 19 MG/DL (ref 6–20)
CALCIUM SERPL-MCNC: 9.2 MG/DL (ref 8.7–10.5)
CHLORIDE SERPL-SCNC: 106 MMOL/L (ref 95–110)
CHOLEST SERPL-MCNC: 167 MG/DL (ref 120–199)
CHOLEST/HDLC SERPL: 3 {RATIO} (ref 2–5)
CO2 SERPL-SCNC: 24 MMOL/L (ref 23–29)
CREAT SERPL-MCNC: 1.1 MG/DL (ref 0.5–1.4)
EAG (OHS): 111 MG/DL (ref 68–131)
ERYTHROCYTE [DISTWIDTH] IN BLOOD BY AUTOMATED COUNT: 13.8 % (ref 11.5–14.5)
FERRITIN SERPL-MCNC: 92 NG/ML (ref 20–300)
GFR SERPLBLD CREATININE-BSD FMLA CKD-EPI: >60 ML/MIN/1.73/M2
GLUCOSE SERPL-MCNC: 115 MG/DL (ref 70–110)
HBA1C MFR BLD: 5.5 % (ref 4–5.6)
HCT VFR BLD AUTO: 42.5 % (ref 37–48.5)
HDLC SERPL-MCNC: 56 MG/DL (ref 40–75)
HDLC SERPL: 33.5 % (ref 20–50)
HGB BLD-MCNC: 13.1 GM/DL (ref 12–16)
IMM GRANULOCYTES # BLD AUTO: 0.12 K/UL (ref 0–0.04)
IMM GRANULOCYTES NFR BLD AUTO: 1.4 % (ref 0–0.5)
IRON SATN MFR SERPL: 26 % (ref 20–50)
IRON SERPL-MCNC: 97 UG/DL (ref 30–160)
LDLC SERPL CALC-MCNC: 101.8 MG/DL (ref 63–159)
LYMPHOCYTES # BLD AUTO: 2.11 K/UL (ref 1–4.8)
MCH RBC QN AUTO: 28.5 PG (ref 27–31)
MCHC RBC AUTO-ENTMCNC: 30.8 G/DL (ref 32–36)
MCV RBC AUTO: 92 FL (ref 82–98)
NONHDLC SERPL-MCNC: 111 MG/DL
NUCLEATED RBC (/100WBC) (OHS): 0 /100 WBC
PLATELET # BLD AUTO: 171 K/UL (ref 150–450)
PMV BLD AUTO: 11.2 FL (ref 9.2–12.9)
POTASSIUM SERPL-SCNC: 4.7 MMOL/L (ref 3.5–5.1)
PROT SERPL-MCNC: 7.1 GM/DL (ref 6–8.4)
RBC # BLD AUTO: 4.6 M/UL (ref 4–5.4)
RELATIVE EOSINOPHIL (OHS): 5 %
RELATIVE LYMPHOCYTE (OHS): 25.2 % (ref 18–48)
RELATIVE MONOCYTE (OHS): 4.4 % (ref 4–15)
RELATIVE NEUTROPHIL (OHS): 63.2 % (ref 38–73)
SODIUM SERPL-SCNC: 140 MMOL/L (ref 136–145)
TIBC SERPL-MCNC: 380 UG/DL (ref 250–450)
TRANSFERRIN SERPL-MCNC: 257 MG/DL (ref 200–375)
TRIGL SERPL-MCNC: 46 MG/DL (ref 30–150)
WBC # BLD AUTO: 8.38 K/UL (ref 3.9–12.7)

## 2025-06-19 PROCEDURE — 3075F SYST BP GE 130 - 139MM HG: CPT | Mod: CPTII,S$GLB,, | Performed by: PHYSICIAN ASSISTANT

## 2025-06-19 PROCEDURE — 80061 LIPID PANEL: CPT

## 2025-06-19 PROCEDURE — 1159F MED LIST DOCD IN RCRD: CPT | Mod: CPTII,S$GLB,, | Performed by: PHYSICIAN ASSISTANT

## 2025-06-19 PROCEDURE — 82728 ASSAY OF FERRITIN: CPT

## 2025-06-19 PROCEDURE — 1160F RVW MEDS BY RX/DR IN RCRD: CPT | Mod: CPTII,S$GLB,, | Performed by: PHYSICIAN ASSISTANT

## 2025-06-19 PROCEDURE — 36415 COLL VENOUS BLD VENIPUNCTURE: CPT | Mod: PO

## 2025-06-19 PROCEDURE — 85025 COMPLETE CBC W/AUTO DIFF WBC: CPT

## 2025-06-19 PROCEDURE — 3079F DIAST BP 80-89 MM HG: CPT | Mod: CPTII,S$GLB,, | Performed by: PHYSICIAN ASSISTANT

## 2025-06-19 PROCEDURE — 3008F BODY MASS INDEX DOCD: CPT | Mod: CPTII,S$GLB,, | Performed by: PHYSICIAN ASSISTANT

## 2025-06-19 PROCEDURE — 4010F ACE/ARB THERAPY RXD/TAKEN: CPT | Mod: CPTII,S$GLB,, | Performed by: PHYSICIAN ASSISTANT

## 2025-06-19 PROCEDURE — 83540 ASSAY OF IRON: CPT

## 2025-06-19 PROCEDURE — 82435 ASSAY OF BLOOD CHLORIDE: CPT

## 2025-06-19 PROCEDURE — 99999 PR PBB SHADOW E&M-EST. PATIENT-LVL III: CPT | Mod: PBBFAC,,, | Performed by: PHYSICIAN ASSISTANT

## 2025-06-19 PROCEDURE — 83036 HEMOGLOBIN GLYCOSYLATED A1C: CPT

## 2025-06-19 PROCEDURE — 99396 PREV VISIT EST AGE 40-64: CPT | Mod: S$GLB,,, | Performed by: PHYSICIAN ASSISTANT

## 2025-06-19 RX ORDER — PANTOPRAZOLE SODIUM 40 MG/1
40 TABLET, DELAYED RELEASE ORAL DAILY
Qty: 90 TABLET | Refills: 3 | Status: SHIPPED | OUTPATIENT
Start: 2025-06-19

## 2025-06-19 RX ORDER — DESVENLAFAXINE 50 MG/1
50 TABLET, FILM COATED, EXTENDED RELEASE ORAL DAILY
Qty: 90 TABLET | Refills: 3 | Status: SHIPPED | OUTPATIENT
Start: 2025-06-19

## 2025-06-19 RX ORDER — IRBESARTAN 300 MG/1
300 TABLET ORAL NIGHTLY
Qty: 90 TABLET | Refills: 3 | Status: SHIPPED | OUTPATIENT
Start: 2025-06-19

## 2025-06-19 NOTE — PROGRESS NOTES
This note is specifically for wellness visit performed today.         Assessment / Plan:       Patient here for annual wellness exam. Health maintenance was reviewed and ordered.     Complete history and physical was completed today. Complete and thorough medication reconciliation was performed. Discussed risks and benefits of medications. Advised patient on orders and health maintenance. Continue current medications listed on your summary sheet.     All questions were answered. Patient had no further concerns. Advised of diagnoses and plan.     1. Encounter for annual health examination  -     CBC Auto Differential; Future; Expected date: 06/19/2025  -     Comprehensive Metabolic Panel; Future; Expected date: 06/19/2025    2. Anxiety  Overview:  The patient has anxiety which is being treated with pristiq. Treatment has been given since 11/2021. This has controled the symptoms. Things that makes it worse include stress and the current treatment makes it better.      Orders:  -     desvenlafaxine succinate (PRISTIQ) 50 MG Tb24; Take 1 tablet (50 mg total) by mouth once daily.  Dispense: 90 tablet; Refill: 3    3. Essential hypertension  Overview:  The patient presents with essential hypertension. The patient is tolerating the medication well and is in excellent compliance. The patient is experiencing no side effects. Counseling was offered regarding low salt diets. The patient has a reduced salt intake. The patient denies chest pain, palpitations, shortness of breath, dyspnea on exertion, left or murmur neck pain, nausea, vomiting, diaphoresis, paroxysmal nocturnal dyspnea, and orthopnea.   Hypertension Medications              irbesartan (AVAPRO) 300 MG tablet Take 1 tablet (300 mg total) by mouth every evening.            Orders:  -     irbesartan (AVAPRO) 300 MG tablet; Take 1 tablet (300 mg total) by mouth every evening.  Dispense: 90 tablet; Refill: 3  -     CBC Auto Differential; Future; Expected date:  06/19/2025  -     Comprehensive Metabolic Panel; Future; Expected date: 06/19/2025    4. Iron deficiency anemia due to chronic blood loss  Overview:  The patient has had a CBC which showed anemia. This has improved over times. She does donate blood. She has had times that her blood was too low to donate. She is on protonix for GERD. She is not hurting in the abdomen and has not seen black or red stools. She has had a hysterectomy. She is not on iron supplementation.   Lab Results   Component Value Date    WBC 8.03 06/17/2024    HGB 12.8 06/17/2024    HCT 39.9 06/17/2024    MCV 92 06/17/2024     06/17/2024     The iron saturation was low at 13.  Lab Results   Component Value Date    IRON 96 05/28/2022    TIBC 306 05/28/2022       Orders:  -     CBC Auto Differential; Future; Expected date: 06/19/2025  -     Iron and TIBC; Future; Expected date: 06/19/2025  -     Ferritin; Future; Expected date: 06/19/2025    5. Severe obesity (BMI 35.0-39.9) with comorbidity  Overview:  The patient presents with obesity. Denies bulimia, cold intolerance, edema, hip pain, hirsutism, knee pain, polydipsia, polyuria, thirst and weakness. The patient does not perform regular exercise. Previous treatments for obesity :self-directed dieting without success. The patient and I discussed the importance of exercise.    Wt Readings from Last 4 Encounters:   06/19/25 109.5 kg (241 lb 8 oz)   12/18/24 109.5 kg (241 lb 6.5 oz)   06/17/24 111.6 kg (246 lb)   06/06/23 107.5 kg (237 lb)         6. Gastroesophageal reflux disease without esophagitis  Overview:  The patient presents with GERD. Denies chest pain, nausea & vomiting, belching, cramping, distention, dyspepsia, dysphagia, hematochezia, melena, abdominal pain and weight loss. Current treatment has included medications that are listed in medications list with significant response. There has been no medicine intolerance. The patient cannot identify any exacerbating factors. Avoidance of  NSAID's, ASA, carbonated beverages and spicy food was discussed. She has tried going to every other day dosing and she did not tolerate this so she is taking it daily now.    Orders:  -     pantoprazole (PROTONIX) 40 MG tablet; Take 1 tablet (40 mg total) by mouth once daily.  Dispense: 90 tablet; Refill: 3    7. Encounter for lipid screening for cardiovascular disease  -     Lipid Panel; Future; Expected date: 06/19/2025    8. Diabetes mellitus screening  -     Hemoglobin A1C; Future; Expected date: 06/19/2025        Follow up in about 6 months (around 12/19/2025), or if symptoms worsen or fail to improve.    Yue Jeffrey PA-C      WELLNESS EXAM      Patient ID: Danielle Alarcon is a 49 y.o. female.  has a past medical history of Anesthesia complication, Asthma, Bronchial pneumonia, Depression, GERD (gastroesophageal reflux disease), Gestational diabetes, History of bronchitis, Hypertension, and Postoperative urinary retention.     Chief Complaint:  Encounter for wellness exam    Well Adult Physical: Patient here for a comprehensive physical exam.     History of Present Illness    CHIEF COMPLAINT:  Ms. Alarcon presents today for annual follow-up.    CURRENT MEDICATIONS:  She takes Protonix 40 mg for acid reflux with good symptom control, Irbesartan 150 mg with well-controlled blood pressure, Pristiq 50 mg with stable anxiety, Zyrtec 10 mg OTC, and a daily multivitamin.    MEDICAL HISTORY:  She has a history of gestational diabetes during pregnancy requiring periodic monitoring. She also has a history of iron deficiency that previously prevented blood donation, currently managed with iron from multivitamin supplementation.    PREVENTIVE CARE:  Endoscopy and colonoscopy in 2021 were normal. Mammogram in January was normal.    No other complaints today.     Health Maintenance Topics with due status: Not Due       Topic Last Completion Date    TETANUS VACCINE 03/08/2018    Colorectal Cancer Screening  07/15/2021    Mammogram 01/31/2025    RSV Vaccine (Age 60+ and Pregnant patients) Not Due      ==============================================  Health Maintenance Due   Topic Date Due    Pneumococcal Vaccines (Age 50+) (3 of 3 - PCV20 or PCV21) 08/09/2023    COVID-19 Vaccine (4 - 2024-25 season) 09/01/2024    Hemoglobin A1c (Diabetic Prevention Screening)  05/28/2025    Lipid Panel  06/17/2025     Past Medical History:   Diagnosis Date    Anesthesia complication     delayed urination    Asthma     allergy induced    Bronchial pneumonia     2008    Depression     GERD (gastroesophageal reflux disease)     Gestational diabetes     History of bronchitis     Hypertension     Postoperative urinary retention      Past Surgical History:   Procedure Laterality Date    CHOLECYSTECTOMY      COLONOSCOPY N/A 7/15/2021    Procedure: COLONOSCOPY;  Surgeon: Jackie Soares MD;  Location: [facility];  Service: Endoscopy;  Laterality: N/A;    CYSTOSCOPY W/ URETERAL STENT PLACEMENT Right 12/29/2021    Procedure: CYSTOSCOPY, WITH URETERAL STENT INSERTION;  Surgeon: Falguni Keys MD;  Location: [facility];  Service: Urology;  Laterality: Right;    CYSTOURETEROSCOPY WITH RETROGRADE PYELOGRAPHY AND INSERTION OF STENT INTO URETER Right 1/12/2022    Procedure: CYSTOURETEROSCOPY, WITH RETROGRADE PYELOGRAM AND URETERAL STENT INSERTION;  Surgeon: Falguni Keys MD;  Location: [facility];  Service: Urology;  Laterality: Right;    ESOPHAGOGASTRODUODENOSCOPY N/A 7/15/2021    Procedure: ESOPHAGOGASTRODUODENOSCOPY (EGD);  Surgeon: Jackie Soares MD;  Location: [facility];  Service: Endoscopy;  Laterality: N/A;    EXTRACORPOREAL SHOCK WAVE LITHOTRIPSY Right 12/29/2021    Procedure: flexible cystoscopy for gross hematuria and Right eswl;  Surgeon: Falguni Keys MD;  Location: [facility] OR;  Service: Urology;  Laterality: Right;    LASER LITHOTRIPSY Right 1/12/2022    Procedure: LITHOTRIPSY, USING LASER;  Surgeon: Falguni TOWNSEND  MD Massimo;  Location: [facility] OR;  Service: Urology;  Laterality: Right;    TONSILLECTOMY      TOTAL ABDOMINAL HYSTERECTOMY N/A 1/24/2020    Procedure: HYSTERECTOMY, TOTAL, ABDOMINAL;  Surgeon: Sonia Bird MD;  Location: [facility] OR;  Service: OB/GYN;  Laterality: N/A;    URETEROSCOPIC REMOVAL OF URETERIC CALCULUS Right 1/12/2022    Procedure: REMOVAL, CALCULUS, URETER, URETEROSCOPIC;  Surgeon: Falguni Keys MD;  Location: [facility] OR;  Service: Urology;  Laterality: Right;     Review of patient's allergies indicates:   Allergen Reactions    Hydrocodone Itching and Nausea Only     Medications Ordered Prior to Encounter[1]  Social History[2]  Family History   Problem Relation Name Age of Onset    Diabetes Mother      Hypertension Mother      Hyperlipidemia Mother      Hyperlipidemia Father      Hypertension Father      Diabetes Brother         Review of Systems   Constitutional:  Negative for chills, fever and malaise/fatigue.   Respiratory:  Negative for cough and shortness of breath.    Cardiovascular:  Negative for chest pain, palpitations and leg swelling.   Gastrointestinal:  Negative for abdominal pain, constipation and diarrhea.   Genitourinary:  Negative for dysuria and frequency.   Musculoskeletal:  Negative for back pain and joint pain.   Neurological:  Negative for headaches.   Psychiatric/Behavioral:  Negative for depression. The patient is not nervous/anxious.            Objective:      Vitals:    06/19/25 0737   BP: 136/86   Pulse: 68   Resp: 18     Body mass index is 37.82 kg/m².     Physical Exam  Constitutional:       General: She is not in acute distress.     Appearance: She is well-developed.   HENT:      Head: Normocephalic and atraumatic.   Pulmonary:      Effort: Pulmonary effort is normal. No respiratory distress.   Abdominal:      General: There is no distension.   Musculoskeletal:         General: Normal range of motion.      Cervical back: Normal range of motion.    Neurological:      Mental Status: She is alert and oriented to person, place, and time.   Psychiatric:         Mood and Affect: Mood normal.         Behavior: Behavior normal.             All labs, imaging and procedures performed since last visit have been personally reviewed.    DISCLAIMER: This note was compiled by using a speech recognition dictation system and therefore please be aware that typographical / speech recognition errors can and do occur.  Please contact me if you see any errors specifically.  Consent was obtained for [employer] recording system prior to the visit.       [1]   Current Outpatient Medications on File Prior to Visit   Medication Sig Dispense Refill    cetirizine HCl (ZYRTEC ORAL)       multivitamin (THERAGRAN) per tablet Take 1 tablet by mouth once daily. HOLD AM OF SURGERY      [DISCONTINUED] desvenlafaxine succinate (PRISTIQ) 50 MG Tb24 Take 1 tablet (50 mg total) by mouth once daily. 90 tablet 3    [DISCONTINUED] irbesartan (AVAPRO) 300 MG tablet Take 1 tablet (300 mg total) by mouth every evening. 90 tablet 3    [DISCONTINUED] pantoprazole (PROTONIX) 40 MG tablet Take 1 tablet (40 mg total) by mouth once daily. 90 tablet 3     No current facility-administered medications on file prior to visit.   [2]   Social History  Socioeconomic History    Marital status:    Tobacco Use    Smoking status: Former     Current packs/day: 0.00     Types: Cigarettes     Quit date:      Years since quittin.4    Smokeless tobacco: Never   Substance and Sexual Activity    Alcohol use: Yes     Comment: occasionally    Drug use: No    Sexual activity: Yes     Birth control/protection: See Surgical Hx     Social Drivers of Health     Financial Resource Strain: Low Risk  (2024)    Overall Financial Resource Strain (CARDIA)     Difficulty of Paying Living Expenses: Not hard at all   Food Insecurity: No Food Insecurity (2024)    Hunger Vital Sign     Worried About Running Out of  Food in the Last Year: Never true     Ran Out of Food in the Last Year: Never true   Transportation Needs: No Transportation Needs (6/3/2023)    PRAPARE - Transportation     Lack of Transportation (Medical): No     Lack of Transportation (Non-Medical): No   Physical Activity: Insufficiently Active (6/14/2024)    Exercise Vital Sign     Days of Exercise per Week: 3 days     Minutes of Exercise per Session: 20 min   Stress: No Stress Concern Present (6/14/2024)    [employer] - Occupational Stress Questionnaire     Feeling of Stress : Only a little   Housing Stability: Low Risk  (6/3/2023)    Housing Stability Vital Sign     Unable to Pay for Housing in the Last Year: No     Number of Places Lived in the Last Year: 1     Unstable Housing in the Last Year: No

## 2025-06-20 ENCOUNTER — RESULTS FOLLOW-UP (OUTPATIENT)
Dept: FAMILY MEDICINE | Facility: CLINIC | Age: 50
End: 2025-06-20

## (undated) DEVICE — SOL IRR NACL .9% 3000ML

## (undated) DEVICE — GLOVE SURG ULTRA TOUCH 6

## (undated) DEVICE — BAG LINGEMAN DRAIN UROLOGY

## (undated) DEVICE — CATH URETHRAL 16FR RED

## (undated) DEVICE — SLEEVE SCD EXPRESS KNEE MEDIUM

## (undated) DEVICE — ADHESIVE MASTISOL VIAL 48/BX

## (undated) DEVICE — GUIDEWIRE AMPLATZ .035X145 STR

## (undated) DEVICE — JELLY SURGILUBE LUBE TUBE 2OZ

## (undated) DEVICE — CATH POLLACK OPEN-END FLEXI-TI

## (undated) DEVICE — SEE MEDLINE ITEM 157185

## (undated) DEVICE — BASKET N-GAGE 2.2 FR/115CM

## (undated) DEVICE — SPONGE BULKEE II ABSRB 6X6.75

## (undated) DEVICE — GUIDE WIRE MOTION .035 X 150CM

## (undated) DEVICE — SCRUB HIBICLENS 4% CHG 4OZ

## (undated) DEVICE — STENT SET URETERAL 6X24CM: Type: IMPLANTABLE DEVICE | Site: URETER | Status: NON-FUNCTIONAL

## (undated) DEVICE — BOWL STERILE LARGE 32OZ

## (undated) DEVICE — Device

## (undated) DEVICE — DRESSING TRANS 2X2 TEGADERM

## (undated) DEVICE — CONTAINER SPECIMEN OR STER 4OZ

## (undated) DEVICE — SOL 9P NACL IRR PIC IL

## (undated) DEVICE — GLOVE SURG ULTRA TOUCH 7.5

## (undated) DEVICE — SHEATH URETERAL FLEXOR 12X28CM

## (undated) DEVICE — SEE MEDLINE ITEM 157116

## (undated) DEVICE — SET IRR URLGY 2LINE UNIV SPIKE

## (undated) DEVICE — FIBER LASER HOLMIUM 273 MICRON

## (undated) DEVICE — SOL WATER STRL IRR 1000ML

## (undated) DEVICE — SEE MEDLINE ITEM 157117

## (undated) DEVICE — SYR ONLY LUER LOCK 20CC